# Patient Record
Sex: MALE | Race: WHITE | Employment: OTHER | URBAN - METROPOLITAN AREA
[De-identification: names, ages, dates, MRNs, and addresses within clinical notes are randomized per-mention and may not be internally consistent; named-entity substitution may affect disease eponyms.]

---

## 2017-01-01 ENCOUNTER — APPOINTMENT (OUTPATIENT)
Dept: GENERAL RADIOLOGY | Age: 82
DRG: 871 | End: 2017-01-01
Attending: EMERGENCY MEDICINE
Payer: MEDICARE

## 2017-01-01 ENCOUNTER — OFFICE VISIT (OUTPATIENT)
Dept: ORTHOPEDIC SURGERY | Age: 82
End: 2017-01-01

## 2017-01-01 ENCOUNTER — APPOINTMENT (OUTPATIENT)
Dept: ULTRASOUND IMAGING | Age: 82
DRG: 871 | End: 2017-01-01
Attending: FAMILY MEDICINE
Payer: MEDICARE

## 2017-01-01 ENCOUNTER — PATIENT OUTREACH (OUTPATIENT)
Dept: INTERNAL MEDICINE CLINIC | Age: 82
End: 2017-01-01

## 2017-01-01 ENCOUNTER — HOME VISIT (OUTPATIENT)
Dept: INTERNAL MEDICINE CLINIC | Age: 82
End: 2017-01-01

## 2017-01-01 ENCOUNTER — HOSPITAL ENCOUNTER (OUTPATIENT)
Dept: LAB | Age: 82
Discharge: HOME | End: 2017-07-28
Payer: MEDICARE

## 2017-01-01 ENCOUNTER — APPOINTMENT (OUTPATIENT)
Dept: CT IMAGING | Age: 82
DRG: 871 | End: 2017-01-01
Attending: EMERGENCY MEDICINE
Payer: MEDICARE

## 2017-01-01 ENCOUNTER — OFFICE VISIT (OUTPATIENT)
Dept: INTERNAL MEDICINE CLINIC | Age: 82
End: 2017-01-01

## 2017-01-01 ENCOUNTER — HOSPITAL ENCOUNTER (OUTPATIENT)
Dept: LAB | Age: 82
Discharge: HOME | End: 2017-09-21
Payer: MEDICARE

## 2017-01-01 ENCOUNTER — TELEPHONE (OUTPATIENT)
Dept: INTERNAL MEDICINE CLINIC | Age: 82
End: 2017-01-01

## 2017-01-01 ENCOUNTER — OFFICE VISIT (OUTPATIENT)
Dept: CARDIOLOGY CLINIC | Age: 82
End: 2017-01-01

## 2017-01-01 ENCOUNTER — HOSPITAL ENCOUNTER (OUTPATIENT)
Dept: LAB | Age: 82
Discharge: HOME | End: 2017-12-29
Payer: MEDICARE

## 2017-01-01 ENCOUNTER — HOSPITAL ENCOUNTER (INPATIENT)
Age: 82
LOS: 3 days | Discharge: NURSING, REHAB OR LONG-TERM CARE | DRG: 871 | End: 2017-08-14
Attending: EMERGENCY MEDICINE | Admitting: FAMILY MEDICINE
Payer: MEDICARE

## 2017-01-01 ENCOUNTER — APPOINTMENT (OUTPATIENT)
Dept: CT IMAGING | Age: 82
DRG: 871 | End: 2017-01-01
Attending: FAMILY MEDICINE
Payer: MEDICARE

## 2017-01-01 ENCOUNTER — DOCUMENTATION ONLY (OUTPATIENT)
Dept: ORTHOPEDIC SURGERY | Age: 82
End: 2017-01-01

## 2017-01-01 VITALS
HEIGHT: 71 IN | WEIGHT: 188 LBS | DIASTOLIC BLOOD PRESSURE: 54 MMHG | TEMPERATURE: 97.6 F | BODY MASS INDEX: 26.32 KG/M2 | RESPIRATION RATE: 20 BRPM | HEART RATE: 48 BPM | SYSTOLIC BLOOD PRESSURE: 102 MMHG | OXYGEN SATURATION: 98 %

## 2017-01-01 VITALS
HEIGHT: 71 IN | BODY MASS INDEX: 28 KG/M2 | HEART RATE: 64 BPM | TEMPERATURE: 98.7 F | DIASTOLIC BLOOD PRESSURE: 54 MMHG | WEIGHT: 200 LBS | OXYGEN SATURATION: 98 % | RESPIRATION RATE: 20 BRPM | SYSTOLIC BLOOD PRESSURE: 98 MMHG

## 2017-01-01 VITALS — SYSTOLIC BLOOD PRESSURE: 114 MMHG | OXYGEN SATURATION: 100 % | DIASTOLIC BLOOD PRESSURE: 58 MMHG | HEART RATE: 68 BPM

## 2017-01-01 VITALS — SYSTOLIC BLOOD PRESSURE: 124 MMHG | DIASTOLIC BLOOD PRESSURE: 76 MMHG | HEART RATE: 78 BPM

## 2017-01-01 VITALS
OXYGEN SATURATION: 98 % | BODY MASS INDEX: 24.78 KG/M2 | HEIGHT: 71 IN | RESPIRATION RATE: 16 BRPM | HEART RATE: 57 BPM | WEIGHT: 177 LBS | SYSTOLIC BLOOD PRESSURE: 110 MMHG | TEMPERATURE: 96.9 F | DIASTOLIC BLOOD PRESSURE: 62 MMHG

## 2017-01-01 VITALS
HEART RATE: 59 BPM | BODY MASS INDEX: 25.9 KG/M2 | OXYGEN SATURATION: 97 % | WEIGHT: 185 LBS | DIASTOLIC BLOOD PRESSURE: 52 MMHG | HEIGHT: 71 IN | SYSTOLIC BLOOD PRESSURE: 112 MMHG

## 2017-01-01 VITALS
RESPIRATION RATE: 16 BRPM | WEIGHT: 177.4 LBS | TEMPERATURE: 98.1 F | HEART RATE: 60 BPM | SYSTOLIC BLOOD PRESSURE: 131 MMHG | DIASTOLIC BLOOD PRESSURE: 67 MMHG | BODY MASS INDEX: 24.74 KG/M2

## 2017-01-01 VITALS
WEIGHT: 196.8 LBS | OXYGEN SATURATION: 100 % | BODY MASS INDEX: 27.55 KG/M2 | DIASTOLIC BLOOD PRESSURE: 59 MMHG | RESPIRATION RATE: 18 BRPM | HEART RATE: 78 BPM | HEIGHT: 71 IN | TEMPERATURE: 97.6 F | SYSTOLIC BLOOD PRESSURE: 134 MMHG

## 2017-01-01 VITALS
HEART RATE: 55 BPM | WEIGHT: 196 LBS | HEIGHT: 71 IN | TEMPERATURE: 97.7 F | DIASTOLIC BLOOD PRESSURE: 68 MMHG | OXYGEN SATURATION: 99 % | SYSTOLIC BLOOD PRESSURE: 128 MMHG | RESPIRATION RATE: 20 BRPM | BODY MASS INDEX: 27.44 KG/M2

## 2017-01-01 VITALS
OXYGEN SATURATION: 95 % | HEART RATE: 54 BPM | TEMPERATURE: 98.1 F | SYSTOLIC BLOOD PRESSURE: 162 MMHG | HEIGHT: 71 IN | BODY MASS INDEX: 28.6 KG/M2 | RESPIRATION RATE: 18 BRPM | DIASTOLIC BLOOD PRESSURE: 70 MMHG | WEIGHT: 204.3 LBS

## 2017-01-01 VITALS — HEART RATE: 78 BPM | DIASTOLIC BLOOD PRESSURE: 68 MMHG | OXYGEN SATURATION: 94 % | SYSTOLIC BLOOD PRESSURE: 142 MMHG

## 2017-01-01 DIAGNOSIS — G93.40 ACUTE ENCEPHALOPATHY: Primary | ICD-10-CM

## 2017-01-01 DIAGNOSIS — G93.40 ENCEPHALOPATHY: Primary | ICD-10-CM

## 2017-01-01 DIAGNOSIS — I10 ESSENTIAL HYPERTENSION, BENIGN: ICD-10-CM

## 2017-01-01 DIAGNOSIS — M48.061 SPINAL STENOSIS, LUMBAR REGION, WITHOUT NEUROGENIC CLAUDICATION: Primary | ICD-10-CM

## 2017-01-01 DIAGNOSIS — R60.0 BILATERAL EDEMA OF LOWER EXTREMITY: Primary | ICD-10-CM

## 2017-01-01 DIAGNOSIS — M48.10 DISH (DIFFUSE IDIOPATHIC SKELETAL HYPEROSTOSIS): Chronic | ICD-10-CM

## 2017-01-01 DIAGNOSIS — R63.0 ANOREXIA: ICD-10-CM

## 2017-01-01 DIAGNOSIS — J18.9 CAP (COMMUNITY ACQUIRED PNEUMONIA): ICD-10-CM

## 2017-01-01 DIAGNOSIS — M50.30 DDD (DEGENERATIVE DISC DISEASE), CERVICAL: ICD-10-CM

## 2017-01-01 DIAGNOSIS — K80.20 GALLSTONES: ICD-10-CM

## 2017-01-01 DIAGNOSIS — K81.1 CHRONIC CHOLECYSTITIS: ICD-10-CM

## 2017-01-01 DIAGNOSIS — I25.10 ATHEROSCLEROSIS OF NATIVE CORONARY ARTERY OF NATIVE HEART WITHOUT ANGINA PECTORIS: ICD-10-CM

## 2017-01-01 DIAGNOSIS — G45.8 OTHER SPECIFIED TRANSIENT CEREBRAL ISCHEMIAS: ICD-10-CM

## 2017-01-01 DIAGNOSIS — R10.9 INTRACTABLE ABDOMINAL PAIN: ICD-10-CM

## 2017-01-01 DIAGNOSIS — E87.6 HYPOKALEMIA: ICD-10-CM

## 2017-01-01 DIAGNOSIS — I10 ESSENTIAL HYPERTENSION, BENIGN: Primary | ICD-10-CM

## 2017-01-01 DIAGNOSIS — G93.40 ENCEPHALOPATHY: ICD-10-CM

## 2017-01-01 DIAGNOSIS — M48.10 DISH (DIFFUSE IDIOPATHIC SKELETAL HYPEROSTOSIS): Primary | Chronic | ICD-10-CM

## 2017-01-01 DIAGNOSIS — R60.0 BILATERAL LEG EDEMA: Primary | ICD-10-CM

## 2017-01-01 DIAGNOSIS — A41.9 SEPSIS, DUE TO UNSPECIFIED ORGANISM: Primary | ICD-10-CM

## 2017-01-01 DIAGNOSIS — E78.5 DYSLIPIDEMIA: ICD-10-CM

## 2017-01-01 DIAGNOSIS — G93.40 ACUTE ENCEPHALOPATHY: ICD-10-CM

## 2017-01-01 DIAGNOSIS — R91.1 LUNG NODULE: ICD-10-CM

## 2017-01-01 DIAGNOSIS — M48.061 SPINAL STENOSIS, LUMBAR REGION, WITHOUT NEUROGENIC CLAUDICATION: ICD-10-CM

## 2017-01-01 LAB
ALBUMIN SERPL BCP-MCNC: 3.4 G/DL (ref 3.4–5)
ALBUMIN SERPL BCP-MCNC: 3.6 G/DL (ref 3.4–5)
ALBUMIN SERPL-MCNC: 3.5 G/DL (ref 3.4–5)
ALBUMIN SERPL-MCNC: 3.8 G/DL (ref 3.4–5)
ALBUMIN/GLOB SERPL: 1 {RATIO} (ref 0.8–1.7)
ALBUMIN/GLOB SERPL: 1.3 {RATIO} (ref 0.8–1.7)
ALBUMIN/GLOB SERPL: 1.3 {RATIO} (ref 0.8–1.7)
ALBUMIN/GLOB SERPL: 1.4 {RATIO} (ref 0.8–1.7)
ALP SERPL-CCNC: 135 U/L (ref 45–117)
ALP SERPL-CCNC: 59 U/L (ref 45–117)
ALP SERPL-CCNC: 65 U/L (ref 45–117)
ALP SERPL-CCNC: 67 U/L (ref 45–117)
ALT SERPL-CCNC: 14 U/L (ref 16–61)
ALT SERPL-CCNC: 16 U/L (ref 16–61)
ALT SERPL-CCNC: 17 U/L (ref 16–61)
ALT SERPL-CCNC: 55 U/L (ref 16–61)
AMMONIA PLAS-SCNC: 26 UMOL/L (ref 11–32)
ANION GAP BLD CALC-SCNC: 7 MMOL/L (ref 3–18)
ANION GAP BLD CALC-SCNC: 8 MMOL/L (ref 3–18)
ANION GAP BLD CALC-SCNC: 9 MMOL/L (ref 3–18)
ANION GAP SERPL CALC-SCNC: 10 MMOL/L (ref 3–18)
ANION GAP SERPL CALC-SCNC: 4 MMOL/L (ref 3–18)
APPEARANCE UR: CLEAR
APTT PPP: 29.3 SEC (ref 23–36.4)
AST SERPL W P-5'-P-CCNC: 12 U/L (ref 15–37)
AST SERPL W P-5'-P-CCNC: 12 U/L (ref 15–37)
AST SERPL-CCNC: 12 U/L (ref 15–37)
AST SERPL-CCNC: 26 U/L (ref 15–37)
ATRIAL RATE: 81 BPM
BACTERIA SPEC CULT: NORMAL
BACTERIA SPEC CULT: NORMAL
BASOPHILS # BLD AUTO: 0 K/UL (ref 0–0.06)
BASOPHILS # BLD AUTO: 0 K/UL (ref 0–0.06)
BASOPHILS # BLD AUTO: 0 K/UL (ref 0–0.1)
BASOPHILS # BLD: 0 % (ref 0–2)
BASOPHILS # BLD: 0 % (ref 0–3)
BASOPHILS # BLD: 0 K/UL (ref 0–0.06)
BASOPHILS # BLD: 0 K/UL (ref 0–0.06)
BASOPHILS NFR BLD: 0 % (ref 0–2)
BASOPHILS NFR BLD: 0 % (ref 0–2)
BILIRUB SERPL-MCNC: 0.4 MG/DL (ref 0.2–1)
BILIRUB SERPL-MCNC: 0.4 MG/DL (ref 0.2–1)
BILIRUB SERPL-MCNC: 0.6 MG/DL (ref 0.2–1)
BILIRUB SERPL-MCNC: 0.8 MG/DL (ref 0.2–1)
BILIRUB UR QL: NEGATIVE
BNP SERPL-MCNC: 1227 PG/ML (ref 0–1800)
BUN SERPL-MCNC: 11 MG/DL (ref 7–18)
BUN SERPL-MCNC: 12 MG/DL (ref 7–18)
BUN SERPL-MCNC: 13 MG/DL (ref 7–18)
BUN SERPL-MCNC: 14 MG/DL (ref 7–18)
BUN SERPL-MCNC: 15 MG/DL (ref 7–18)
BUN SERPL-MCNC: 15 MG/DL (ref 7–18)
BUN SERPL-MCNC: 16 MG/DL (ref 7–18)
BUN SERPL-MCNC: 18 MG/DL (ref 7–18)
BUN/CREAT SERPL: 14 (ref 12–20)
BUN/CREAT SERPL: 16 (ref 12–20)
BUN/CREAT SERPL: 16 (ref 12–20)
BUN/CREAT SERPL: 21 (ref 12–20)
BUN/CREAT SERPL: 22 (ref 12–20)
BUN/CREAT SERPL: 22 (ref 12–20)
BUN/CREAT SERPL: 24 (ref 12–20)
BUN/CREAT SERPL: 25 (ref 12–20)
CALCIUM SERPL-MCNC: 7.9 MG/DL (ref 8.5–10.1)
CALCIUM SERPL-MCNC: 8 MG/DL (ref 8.5–10.1)
CALCIUM SERPL-MCNC: 8.3 MG/DL (ref 8.5–10.1)
CALCIUM SERPL-MCNC: 8.4 MG/DL (ref 8.5–10.1)
CALCIUM SERPL-MCNC: 8.8 MG/DL (ref 8.5–10.1)
CALCIUM SERPL-MCNC: 8.9 MG/DL (ref 8.5–10.1)
CALCIUM SERPL-MCNC: 9 MG/DL (ref 8.5–10.1)
CALCIUM SERPL-MCNC: 9.2 MG/DL (ref 8.5–10.1)
CALCULATED P AXIS, ECG09: 53 DEGREES
CALCULATED R AXIS, ECG10: -24 DEGREES
CALCULATED T AXIS, ECG11: 47 DEGREES
CHLORIDE SERPL-SCNC: 102 MMOL/L (ref 100–108)
CHLORIDE SERPL-SCNC: 103 MMOL/L (ref 100–108)
CHLORIDE SERPL-SCNC: 103 MMOL/L (ref 100–108)
CHLORIDE SERPL-SCNC: 106 MMOL/L (ref 100–108)
CHLORIDE SERPL-SCNC: 106 MMOL/L (ref 100–108)
CHLORIDE SERPL-SCNC: 109 MMOL/L (ref 100–108)
CHLORIDE SERPL-SCNC: 110 MMOL/L (ref 100–108)
CHLORIDE SERPL-SCNC: 110 MMOL/L (ref 100–108)
CK MB CFR SERPL CALC: NORMAL % (ref 0–4)
CK MB SERPL-MCNC: <1 NG/ML (ref 5–25)
CK SERPL-CCNC: 45 U/L (ref 39–308)
CO2 SERPL-SCNC: 25 MMOL/L (ref 21–32)
CO2 SERPL-SCNC: 28 MMOL/L (ref 21–32)
CO2 SERPL-SCNC: 29 MMOL/L (ref 21–32)
CO2 SERPL-SCNC: 30 MMOL/L (ref 21–32)
CO2 SERPL-SCNC: 33 MMOL/L (ref 21–32)
CO2 SERPL-SCNC: 34 MMOL/L (ref 21–32)
COLOR UR: YELLOW
CREAT SERPL-MCNC: 0.56 MG/DL (ref 0.6–1.3)
CREAT SERPL-MCNC: 0.63 MG/DL (ref 0.6–1.3)
CREAT SERPL-MCNC: 0.65 MG/DL (ref 0.6–1.3)
CREAT SERPL-MCNC: 0.71 MG/DL (ref 0.6–1.3)
CREAT SERPL-MCNC: 0.72 MG/DL (ref 0.6–1.3)
CREAT SERPL-MCNC: 0.78 MG/DL (ref 0.6–1.3)
CREAT SERPL-MCNC: 0.79 MG/DL (ref 0.6–1.3)
CREAT SERPL-MCNC: 0.93 MG/DL (ref 0.6–1.3)
DATE LAST DOSE: ABNORMAL
DIAGNOSIS, 93000: NORMAL
DIFFERENTIAL METHOD BLD: ABNORMAL
EOSINOPHIL # BLD: 0 K/UL (ref 0–0.4)
EOSINOPHIL # BLD: 0 K/UL (ref 0–0.4)
EOSINOPHIL # BLD: 0.1 K/UL (ref 0–0.4)
EOSINOPHIL # BLD: 0.3 K/UL (ref 0–0.4)
EOSINOPHIL # BLD: 0.4 K/UL (ref 0–0.4)
EOSINOPHIL # BLD: 0.4 K/UL (ref 0–0.4)
EOSINOPHIL NFR BLD: 0 % (ref 0–5)
EOSINOPHIL NFR BLD: 0 % (ref 0–5)
EOSINOPHIL NFR BLD: 1 % (ref 0–5)
EOSINOPHIL NFR BLD: 3 % (ref 0–5)
EOSINOPHIL NFR BLD: 5 % (ref 0–5)
EOSINOPHIL NFR BLD: 6 % (ref 0–5)
ERYTHROCYTE [DISTWIDTH] IN BLOOD BY AUTOMATED COUNT: 12.9 % (ref 11.6–14.5)
ERYTHROCYTE [DISTWIDTH] IN BLOOD BY AUTOMATED COUNT: 13.2 % (ref 11.6–14.5)
ERYTHROCYTE [DISTWIDTH] IN BLOOD BY AUTOMATED COUNT: 13.4 % (ref 11.6–14.5)
ERYTHROCYTE [DISTWIDTH] IN BLOOD BY AUTOMATED COUNT: 13.6 % (ref 11.6–14.5)
ERYTHROCYTE [DISTWIDTH] IN BLOOD BY AUTOMATED COUNT: 14 % (ref 11.6–14.5)
EST. AVERAGE GLUCOSE BLD GHB EST-MCNC: 146 MG/DL
GLOBULIN SER CALC-MCNC: 2.7 G/DL (ref 2–4)
GLOBULIN SER CALC-MCNC: 3.5 G/DL (ref 2–4)
GLUCOSE BLD STRIP.AUTO-MCNC: 128 MG/DL (ref 70–110)
GLUCOSE SERPL-MCNC: 102 MG/DL (ref 74–99)
GLUCOSE SERPL-MCNC: 108 MG/DL (ref 74–99)
GLUCOSE SERPL-MCNC: 110 MG/DL (ref 74–99)
GLUCOSE SERPL-MCNC: 113 MG/DL (ref 74–99)
GLUCOSE SERPL-MCNC: 117 MG/DL (ref 74–99)
GLUCOSE SERPL-MCNC: 123 MG/DL (ref 74–99)
GLUCOSE SERPL-MCNC: 140 MG/DL (ref 74–99)
GLUCOSE SERPL-MCNC: 193 MG/DL (ref 74–99)
GLUCOSE UR STRIP.AUTO-MCNC: NEGATIVE MG/DL
HBA1C MFR BLD: 6.7 % (ref 4.2–5.6)
HCT VFR BLD AUTO: 31.8 % (ref 36–48)
HCT VFR BLD AUTO: 32.8 % (ref 36–48)
HCT VFR BLD AUTO: 35.8 % (ref 36–48)
HCT VFR BLD AUTO: 36.6 % (ref 36–48)
HCT VFR BLD AUTO: 38 % (ref 36–48)
HCT VFR BLD AUTO: 39.8 % (ref 36–48)
HCT VFR BLD AUTO: 41.3 % (ref 36–48)
HCT VFR BLD AUTO: 42.3 % (ref 36–48)
HGB BLD-MCNC: 10.8 G/DL (ref 13–16)
HGB BLD-MCNC: 11.1 G/DL (ref 13–16)
HGB BLD-MCNC: 11.9 G/DL (ref 13–16)
HGB BLD-MCNC: 12.1 G/DL (ref 13–16)
HGB BLD-MCNC: 12.2 G/DL (ref 13–16)
HGB BLD-MCNC: 13.5 G/DL (ref 13–16)
HGB UR QL STRIP: NEGATIVE
INR PPP: 1.1 (ref 0.8–1.2)
KETONES UR QL STRIP.AUTO: NEGATIVE MG/DL
LACTATE BLD-SCNC: 1.8 MMOL/L (ref 0.4–2)
LACTATE BLD-SCNC: 2.6 MMOL/L (ref 0.4–2)
LEUKOCYTE ESTERASE UR QL STRIP.AUTO: NEGATIVE
LIPASE SERPL-CCNC: 60 U/L (ref 73–393)
LYMPHOCYTES # BLD AUTO: 10 % (ref 21–52)
LYMPHOCYTES # BLD AUTO: 16 % (ref 21–52)
LYMPHOCYTES # BLD AUTO: 16 % (ref 21–52)
LYMPHOCYTES # BLD AUTO: 18 % (ref 21–52)
LYMPHOCYTES # BLD AUTO: 35 % (ref 21–52)
LYMPHOCYTES # BLD AUTO: 7 % (ref 20–51)
LYMPHOCYTES # BLD: 1 K/UL (ref 0.8–3.5)
LYMPHOCYTES # BLD: 1.5 K/UL (ref 0.9–3.6)
LYMPHOCYTES # BLD: 1.6 K/UL (ref 0.9–3.6)
LYMPHOCYTES # BLD: 1.7 K/UL (ref 0.9–3.6)
LYMPHOCYTES # BLD: 2.3 K/UL (ref 0.9–3.6)
LYMPHOCYTES # BLD: 2.4 K/UL (ref 0.9–3.6)
LYMPHOCYTES NFR BLD: 14 % (ref 21–52)
LYMPHOCYTES NFR BLD: 41 % (ref 21–52)
MAGNESIUM SERPL-MCNC: 1.8 MG/DL (ref 1.6–2.6)
MCH RBC QN AUTO: 29.3 PG (ref 24–34)
MCH RBC QN AUTO: 30 PG (ref 24–34)
MCH RBC QN AUTO: 30.3 PG (ref 24–34)
MCH RBC QN AUTO: 30.6 PG (ref 24–34)
MCH RBC QN AUTO: 30.7 PG (ref 24–34)
MCH RBC QN AUTO: 30.7 PG (ref 24–34)
MCH RBC QN AUTO: 30.9 PG (ref 24–34)
MCH RBC QN AUTO: 31.3 PG (ref 24–34)
MCHC RBC AUTO-ENTMCNC: 31.9 G/DL (ref 31–37)
MCHC RBC AUTO-ENTMCNC: 32.1 G/DL (ref 31–37)
MCHC RBC AUTO-ENTMCNC: 32.7 G/DL (ref 31–37)
MCHC RBC AUTO-ENTMCNC: 33.1 G/DL (ref 31–37)
MCHC RBC AUTO-ENTMCNC: 33.2 G/DL (ref 31–37)
MCHC RBC AUTO-ENTMCNC: 33.8 G/DL (ref 31–37)
MCHC RBC AUTO-ENTMCNC: 33.9 G/DL (ref 31–37)
MCHC RBC AUTO-ENTMCNC: 34 G/DL (ref 31–37)
MCV RBC AUTO: 89.3 FL (ref 74–97)
MCV RBC AUTO: 90.9 FL (ref 74–97)
MCV RBC AUTO: 92 FL (ref 74–97)
MCV RBC AUTO: 92.3 FL (ref 74–97)
MCV RBC AUTO: 92.3 FL (ref 74–97)
MCV RBC AUTO: 92.4 FL (ref 74–97)
MCV RBC AUTO: 93.6 FL (ref 74–97)
MCV RBC AUTO: 94.5 FL (ref 74–97)
MONOCYTES # BLD: 0.5 K/UL (ref 0.05–1.2)
MONOCYTES # BLD: 0.5 K/UL (ref 0.05–1.2)
MONOCYTES # BLD: 0.6 K/UL (ref 0.05–1.2)
MONOCYTES # BLD: 0.7 K/UL (ref 0.05–1.2)
MONOCYTES # BLD: 0.7 K/UL (ref 0.05–1.2)
MONOCYTES # BLD: 0.8 K/UL (ref 0.05–1.2)
MONOCYTES # BLD: 0.8 K/UL (ref 0.05–1.2)
MONOCYTES # BLD: 0.9 K/UL (ref 0–1)
MONOCYTES NFR BLD AUTO: 5 % (ref 3–10)
MONOCYTES NFR BLD AUTO: 5 % (ref 3–10)
MONOCYTES NFR BLD AUTO: 6 % (ref 2–9)
MONOCYTES NFR BLD AUTO: 7 % (ref 3–10)
MONOCYTES NFR BLD AUTO: 7 % (ref 3–10)
MONOCYTES NFR BLD AUTO: 8 % (ref 3–10)
MONOCYTES NFR BLD: 6 % (ref 3–10)
MONOCYTES NFR BLD: 9 % (ref 3–10)
NEUTS BAND NFR BLD MANUAL: 10 % (ref 0–5)
NEUTS SEG # BLD: 12.6 K/UL (ref 1.8–8)
NEUTS SEG # BLD: 12.8 K/UL (ref 1.8–8)
NEUTS SEG # BLD: 2.6 K/UL (ref 1.8–8)
NEUTS SEG # BLD: 3.5 K/UL (ref 1.8–8)
NEUTS SEG # BLD: 6.4 K/UL (ref 1.8–8)
NEUTS SEG # BLD: 6.9 K/UL (ref 1.8–8)
NEUTS SEG # BLD: 8 K/UL (ref 1.8–8)
NEUTS SEG # BLD: 9.3 K/UL (ref 1.8–8)
NEUTS SEG NFR BLD AUTO: 51 % (ref 40–73)
NEUTS SEG NFR BLD AUTO: 76 % (ref 40–73)
NEUTS SEG NFR BLD AUTO: 77 % (ref 42–75)
NEUTS SEG NFR BLD AUTO: 85 % (ref 40–73)
NEUTS SEG NFR BLD: 45 % (ref 40–73)
NEUTS SEG NFR BLD: 77 % (ref 40–73)
NITRITE UR QL STRIP.AUTO: NEGATIVE
P-R INTERVAL, ECG05: 198 MS
PH UR STRIP: 6 [PH] (ref 5–8)
PLATELET # BLD AUTO: 136 K/UL (ref 135–420)
PLATELET # BLD AUTO: 147 K/UL (ref 135–420)
PLATELET # BLD AUTO: 147 K/UL (ref 135–420)
PLATELET # BLD AUTO: 161 K/UL (ref 135–420)
PLATELET # BLD AUTO: 163 K/UL (ref 135–420)
PLATELET # BLD AUTO: 169 K/UL (ref 135–420)
PLATELET # BLD AUTO: 176 K/UL (ref 135–420)
PLATELET # BLD AUTO: 285 K/UL (ref 135–420)
PLATELET COMMENTS,PCOM: ABNORMAL
PMV BLD AUTO: 10.4 FL (ref 9.2–11.8)
PMV BLD AUTO: 11.1 FL (ref 9.2–11.8)
PMV BLD AUTO: 11.2 FL (ref 9.2–11.8)
PMV BLD AUTO: 11.4 FL (ref 9.2–11.8)
PMV BLD AUTO: 11.4 FL (ref 9.2–11.8)
PMV BLD AUTO: 11.6 FL (ref 9.2–11.8)
POTASSIUM SERPL-SCNC: 3.2 MMOL/L (ref 3.5–5.5)
POTASSIUM SERPL-SCNC: 3.2 MMOL/L (ref 3.5–5.5)
POTASSIUM SERPL-SCNC: 3.3 MMOL/L (ref 3.5–5.5)
POTASSIUM SERPL-SCNC: 3.5 MMOL/L (ref 3.5–5.5)
POTASSIUM SERPL-SCNC: 3.5 MMOL/L (ref 3.5–5.5)
POTASSIUM SERPL-SCNC: 4.3 MMOL/L (ref 3.5–5.5)
POTASSIUM SERPL-SCNC: 4.4 MMOL/L (ref 3.5–5.5)
POTASSIUM SERPL-SCNC: 4.7 MMOL/L (ref 3.5–5.5)
PROT SERPL-MCNC: 6.2 G/DL (ref 6.4–8.2)
PROT SERPL-MCNC: 6.3 G/DL (ref 6.4–8.2)
PROT SERPL-MCNC: 6.5 G/DL (ref 6.4–8.2)
PROT SERPL-MCNC: 6.9 G/DL (ref 6.4–8.2)
PROT UR STRIP-MCNC: NEGATIVE MG/DL
PROTHROMBIN TIME: 13.5 SEC (ref 11.5–15.2)
PSA SERPL-MCNC: 2.2 NG/ML (ref 0–4)
Q-T INTERVAL, ECG07: 408 MS
QRS DURATION, ECG06: 156 MS
QTC CALCULATION (BEZET), ECG08: 473 MS
RBC # BLD AUTO: 3.56 M/UL (ref 4.7–5.5)
RBC # BLD AUTO: 3.61 M/UL (ref 4.7–5.5)
RBC # BLD AUTO: 3.88 M/UL (ref 4.7–5.5)
RBC # BLD AUTO: 3.96 M/UL (ref 4.7–5.5)
RBC # BLD AUTO: 4.06 M/UL (ref 4.7–5.5)
RBC # BLD AUTO: 4.31 M/UL (ref 4.7–5.5)
RBC # BLD AUTO: 4.37 M/UL (ref 4.7–5.5)
RBC # BLD AUTO: 4.6 M/UL (ref 4.7–5.5)
RBC MORPH BLD: ABNORMAL
REPORTED DOSE,DOSE: ABNORMAL UNITS
REPORTED DOSE/TIME,TMG: 1300
SERVICE CMNT-IMP: NORMAL
SERVICE CMNT-IMP: NORMAL
SODIUM SERPL-SCNC: 139 MMOL/L (ref 136–145)
SODIUM SERPL-SCNC: 141 MMOL/L (ref 136–145)
SODIUM SERPL-SCNC: 142 MMOL/L (ref 136–145)
SODIUM SERPL-SCNC: 143 MMOL/L (ref 136–145)
SODIUM SERPL-SCNC: 143 MMOL/L (ref 136–145)
SODIUM SERPL-SCNC: 144 MMOL/L (ref 136–145)
SODIUM SERPL-SCNC: 145 MMOL/L (ref 136–145)
SODIUM SERPL-SCNC: 146 MMOL/L (ref 136–145)
SP GR UR REFRACTOMETRY: 1.03 (ref 1–1.03)
TROPONIN I SERPL-MCNC: <0.02 NG/ML (ref 0–0.04)
TSH SERPL DL<=0.05 MIU/L-ACNC: 0.5 UIU/ML (ref 0.36–3.74)
TSH SERPL DL<=0.05 MIU/L-ACNC: 0.82 UIU/ML (ref 0.36–3.74)
UROBILINOGEN UR QL STRIP.AUTO: 1 EU/DL (ref 0.2–1)
VANCOMYCIN TROUGH SERPL-MCNC: 5.9 UG/ML (ref 10–20)
VENTRICULAR RATE, ECG03: 81 BPM
WBC # BLD AUTO: 10.4 K/UL (ref 4.6–13.2)
WBC # BLD AUTO: 12.1 K/UL (ref 4.6–13.2)
WBC # BLD AUTO: 14.5 K/UL (ref 4.6–13.2)
WBC # BLD AUTO: 15.1 K/UL (ref 4.6–13.2)
WBC # BLD AUTO: 5.7 K/UL (ref 4.6–13.2)
WBC # BLD AUTO: 7 K/UL (ref 4.6–13.2)
WBC # BLD AUTO: 8.5 K/UL (ref 4.6–13.2)
WBC # BLD AUTO: 9.1 K/UL (ref 4.6–13.2)

## 2017-01-01 PROCEDURE — 36415 COLL VENOUS BLD VENIPUNCTURE: CPT

## 2017-01-01 PROCEDURE — 74011636320 HC RX REV CODE- 636/320: Performed by: EMERGENCY MEDICINE

## 2017-01-01 PROCEDURE — 80048 BASIC METABOLIC PNL TOTAL CA: CPT

## 2017-01-01 PROCEDURE — 85610 PROTHROMBIN TIME: CPT | Performed by: EMERGENCY MEDICINE

## 2017-01-01 PROCEDURE — 74011250636 HC RX REV CODE- 250/636: Performed by: FAMILY MEDICINE

## 2017-01-01 PROCEDURE — 85025 COMPLETE CBC W/AUTO DIFF WBC: CPT

## 2017-01-01 PROCEDURE — 82962 GLUCOSE BLOOD TEST: CPT

## 2017-01-01 PROCEDURE — 65270000029 HC RM PRIVATE

## 2017-01-01 PROCEDURE — 83690 ASSAY OF LIPASE: CPT | Performed by: EMERGENCY MEDICINE

## 2017-01-01 PROCEDURE — 74011250637 HC RX REV CODE- 250/637: Performed by: FAMILY MEDICINE

## 2017-01-01 PROCEDURE — 84443 ASSAY THYROID STIM HORMONE: CPT | Performed by: INTERNAL MEDICINE

## 2017-01-01 PROCEDURE — 80202 ASSAY OF VANCOMYCIN: CPT

## 2017-01-01 PROCEDURE — 97116 GAIT TRAINING THERAPY: CPT

## 2017-01-01 PROCEDURE — 76705 ECHO EXAM OF ABDOMEN: CPT

## 2017-01-01 PROCEDURE — 83735 ASSAY OF MAGNESIUM: CPT | Performed by: EMERGENCY MEDICINE

## 2017-01-01 PROCEDURE — 96375 TX/PRO/DX INJ NEW DRUG ADDON: CPT

## 2017-01-01 PROCEDURE — 97530 THERAPEUTIC ACTIVITIES: CPT

## 2017-01-01 PROCEDURE — 83036 HEMOGLOBIN GLYCOSYLATED A1C: CPT

## 2017-01-01 PROCEDURE — 74011000258 HC RX REV CODE- 258: Performed by: EMERGENCY MEDICINE

## 2017-01-01 PROCEDURE — 36415 COLL VENOUS BLD VENIPUNCTURE: CPT | Performed by: FAMILY MEDICINE

## 2017-01-01 PROCEDURE — 87040 BLOOD CULTURE FOR BACTERIA: CPT | Performed by: EMERGENCY MEDICINE

## 2017-01-01 PROCEDURE — 82140 ASSAY OF AMMONIA: CPT | Performed by: EMERGENCY MEDICINE

## 2017-01-01 PROCEDURE — 74011250636 HC RX REV CODE- 250/636: Performed by: EMERGENCY MEDICINE

## 2017-01-01 PROCEDURE — 97162 PT EVAL MOD COMPLEX 30 MIN: CPT

## 2017-01-01 PROCEDURE — 94760 N-INVAS EAR/PLS OXIMETRY 1: CPT

## 2017-01-01 PROCEDURE — 81003 URINALYSIS AUTO W/O SCOPE: CPT | Performed by: EMERGENCY MEDICINE

## 2017-01-01 PROCEDURE — 82550 ASSAY OF CK (CPK): CPT | Performed by: EMERGENCY MEDICINE

## 2017-01-01 PROCEDURE — 85025 COMPLETE CBC W/AUTO DIFF WBC: CPT | Performed by: FAMILY MEDICINE

## 2017-01-01 PROCEDURE — 77030027138 HC INCENT SPIROMETER -A

## 2017-01-01 PROCEDURE — 71250 CT THORAX DX C-: CPT

## 2017-01-01 PROCEDURE — 93005 ELECTROCARDIOGRAM TRACING: CPT

## 2017-01-01 PROCEDURE — 80053 COMPREHEN METABOLIC PANEL: CPT | Performed by: INTERNAL MEDICINE

## 2017-01-01 PROCEDURE — 85730 THROMBOPLASTIN TIME PARTIAL: CPT | Performed by: EMERGENCY MEDICINE

## 2017-01-01 PROCEDURE — 83880 ASSAY OF NATRIURETIC PEPTIDE: CPT | Performed by: FAMILY MEDICINE

## 2017-01-01 PROCEDURE — 83605 ASSAY OF LACTIC ACID: CPT

## 2017-01-01 PROCEDURE — 85025 COMPLETE CBC W/AUTO DIFF WBC: CPT | Performed by: INTERNAL MEDICINE

## 2017-01-01 PROCEDURE — 84153 ASSAY OF PSA TOTAL: CPT | Performed by: FAMILY MEDICINE

## 2017-01-01 PROCEDURE — 80048 BASIC METABOLIC PNL TOTAL CA: CPT | Performed by: FAMILY MEDICINE

## 2017-01-01 PROCEDURE — 71010 XR CHEST PORT: CPT

## 2017-01-01 PROCEDURE — 70450 CT HEAD/BRAIN W/O DYE: CPT

## 2017-01-01 PROCEDURE — 99285 EMERGENCY DEPT VISIT HI MDM: CPT

## 2017-01-01 PROCEDURE — 84443 ASSAY THYROID STIM HORMONE: CPT | Performed by: EMERGENCY MEDICINE

## 2017-01-01 PROCEDURE — 96365 THER/PROPH/DIAG IV INF INIT: CPT

## 2017-01-01 PROCEDURE — 85025 COMPLETE CBC W/AUTO DIFF WBC: CPT | Performed by: EMERGENCY MEDICINE

## 2017-01-01 PROCEDURE — 74177 CT ABD & PELVIS W/CONTRAST: CPT

## 2017-01-01 PROCEDURE — 80053 COMPREHEN METABOLIC PANEL: CPT | Performed by: EMERGENCY MEDICINE

## 2017-01-01 PROCEDURE — 96361 HYDRATE IV INFUSION ADD-ON: CPT

## 2017-01-01 RX ORDER — SODIUM CHLORIDE 0.9 % (FLUSH) 0.9 %
5-10 SYRINGE (ML) INJECTION AS NEEDED
Status: DISCONTINUED | OUTPATIENT
Start: 2017-01-01 | End: 2017-01-01 | Stop reason: HOSPADM

## 2017-01-01 RX ORDER — FUROSEMIDE 20 MG/1
TABLET ORAL
Qty: 30 TAB | Refills: 2 | Status: SHIPPED | OUTPATIENT
Start: 2017-01-01 | End: 2018-01-01

## 2017-01-01 RX ORDER — OXYCODONE AND ACETAMINOPHEN 10; 325 MG/1; MG/1
1 TABLET ORAL
Qty: 90 TAB | Refills: 0 | Status: SHIPPED | OUTPATIENT
Start: 2017-01-01 | End: 2017-01-01 | Stop reason: SDUPTHER

## 2017-01-01 RX ORDER — OXYCODONE AND ACETAMINOPHEN 10; 325 MG/1; MG/1
1 TABLET ORAL
Qty: 90 TAB | Refills: 0 | Status: SHIPPED | OUTPATIENT
Start: 2018-01-01 | End: 2017-01-01 | Stop reason: SDUPTHER

## 2017-01-01 RX ORDER — URSODIOL 300 MG/1
CAPSULE ORAL
Qty: 60 CAP | Refills: 3 | Status: SHIPPED | OUTPATIENT
Start: 2017-01-01 | End: 2018-01-01 | Stop reason: ALTCHOICE

## 2017-01-01 RX ORDER — OXYCODONE HCL 20 MG/1
20 TABLET, FILM COATED, EXTENDED RELEASE ORAL EVERY 12 HOURS
Qty: 60 TAB | Refills: 0 | Status: SHIPPED | OUTPATIENT
Start: 2017-01-01 | End: 2017-01-01 | Stop reason: SDUPTHER

## 2017-01-01 RX ORDER — METOPROLOL TARTRATE 50 MG/1
TABLET ORAL
Qty: 60 TAB | Refills: 11 | Status: SHIPPED | OUTPATIENT
Start: 2017-01-01

## 2017-01-01 RX ORDER — NITROGLYCERIN 0.4 MG/1
TABLET SUBLINGUAL
COMMUNITY

## 2017-01-01 RX ORDER — KETOROLAC TROMETHAMINE 30 MG/ML
15 INJECTION, SOLUTION INTRAMUSCULAR; INTRAVENOUS
Status: DISCONTINUED | OUTPATIENT
Start: 2017-01-01 | End: 2017-01-01 | Stop reason: HOSPADM

## 2017-01-01 RX ORDER — SODIUM CHLORIDE AND POTASSIUM CHLORIDE .9; .15 G/100ML; G/100ML
SOLUTION INTRAVENOUS CONTINUOUS
Status: DISCONTINUED | OUTPATIENT
Start: 2017-01-01 | End: 2017-01-01 | Stop reason: HOSPADM

## 2017-01-01 RX ORDER — OXYCODONE AND ACETAMINOPHEN 10; 325 MG/1; MG/1
1 TABLET ORAL
Qty: 90 TAB | Refills: 0 | Status: SHIPPED | OUTPATIENT
Start: 2017-01-01 | End: 2017-01-01 | Stop reason: ALTCHOICE

## 2017-01-01 RX ORDER — POTASSIUM CHLORIDE 7.45 MG/ML
10 INJECTION INTRAVENOUS
Status: DISPENSED | OUTPATIENT
Start: 2017-01-01 | End: 2017-01-01

## 2017-01-01 RX ORDER — METOPROLOL TARTRATE 50 MG/1
50 TABLET ORAL DAILY
Status: DISCONTINUED | OUTPATIENT
Start: 2017-01-01 | End: 2017-01-01

## 2017-01-01 RX ORDER — OXYCODONE AND ACETAMINOPHEN 5; 325 MG/1; MG/1
1 TABLET ORAL
Status: DISCONTINUED | OUTPATIENT
Start: 2017-01-01 | End: 2017-01-01 | Stop reason: HOSPADM

## 2017-01-01 RX ORDER — LEVOFLOXACIN 5 MG/ML
750 INJECTION, SOLUTION INTRAVENOUS EVERY 24 HOURS
Status: DISCONTINUED | OUTPATIENT
Start: 2017-01-01 | End: 2017-01-01 | Stop reason: HOSPADM

## 2017-01-01 RX ORDER — OXYCODONE HCL 20 MG/1
20 TABLET, FILM COATED, EXTENDED RELEASE ORAL EVERY 12 HOURS
Qty: 60 TAB | Refills: 0 | Status: ON HOLD | OUTPATIENT
Start: 2018-01-01 | End: 2017-01-01 | Stop reason: SDUPTHER

## 2017-01-01 RX ORDER — OXYCODONE HCL 20 MG/1
20 TABLET, FILM COATED, EXTENDED RELEASE ORAL EVERY 12 HOURS
Qty: 60 TAB | Refills: 0 | Status: SHIPPED | OUTPATIENT
Start: 2017-01-01 | End: 2018-01-01 | Stop reason: ALTCHOICE

## 2017-01-01 RX ORDER — LEVOFLOXACIN 750 MG/1
750 TABLET ORAL DAILY
Qty: 7 TAB | Refills: 0 | Status: SHIPPED | OUTPATIENT
Start: 2017-01-01 | End: 2017-01-01

## 2017-01-01 RX ORDER — MIRTAZAPINE 15 MG/1
15 TABLET, ORALLY DISINTEGRATING ORAL
Status: DISCONTINUED | OUTPATIENT
Start: 2017-01-01 | End: 2017-01-01

## 2017-01-01 RX ORDER — OXYCODONE HCL 20 MG/1
20 TABLET, FILM COATED, EXTENDED RELEASE ORAL EVERY 12 HOURS
Qty: 60 TAB | Refills: 0 | Status: SHIPPED | OUTPATIENT
Start: 2017-01-01 | End: 2017-01-01

## 2017-01-01 RX ORDER — MIRTAZAPINE 15 MG/1
TABLET, ORALLY DISINTEGRATING ORAL
Qty: 30 TAB | Refills: 3 | Status: SHIPPED | OUTPATIENT
Start: 2017-01-01 | End: 2017-01-01 | Stop reason: ALTCHOICE

## 2017-01-01 RX ORDER — LIDOCAINE 50 MG/G
1 PATCH TOPICAL
Status: DISCONTINUED | OUTPATIENT
Start: 2017-01-01 | End: 2017-01-01 | Stop reason: HOSPADM

## 2017-01-01 RX ORDER — NITROGLYCERIN 0.4 MG/1
0.4 TABLET SUBLINGUAL AS NEEDED
Status: DISCONTINUED | OUTPATIENT
Start: 2017-01-01 | End: 2017-01-01 | Stop reason: HOSPADM

## 2017-01-01 RX ORDER — NALOXONE HYDROCHLORIDE 4 MG/.1ML
SPRAY NASAL
Qty: 1 BOX | Refills: 0 | Status: SHIPPED | OUTPATIENT
Start: 2017-01-01 | End: 2017-01-01 | Stop reason: ALTCHOICE

## 2017-01-01 RX ORDER — POTASSIUM CHLORIDE 750 MG/1
10 TABLET, EXTENDED RELEASE ORAL DAILY
Status: DISCONTINUED | OUTPATIENT
Start: 2017-01-01 | End: 2017-01-01 | Stop reason: HOSPADM

## 2017-01-01 RX ORDER — POTASSIUM CHLORIDE 750 MG/1
TABLET, EXTENDED RELEASE ORAL
Qty: 60 TAB | Refills: 1 | Status: ON HOLD | OUTPATIENT
Start: 2017-01-01 | End: 2017-01-01

## 2017-01-01 RX ORDER — INDAPAMIDE 2.5 MG/1
1.25 TABLET, FILM COATED ORAL DAILY
Status: DISCONTINUED | OUTPATIENT
Start: 2017-01-01 | End: 2017-01-01

## 2017-01-01 RX ORDER — OXYCODONE HCL 20 MG/1
20 TABLET, FILM COATED, EXTENDED RELEASE ORAL EVERY 12 HOURS
Qty: 60 TAB | Refills: 0 | Status: SHIPPED | OUTPATIENT
Start: 2018-01-01 | End: 2017-01-01 | Stop reason: SDUPTHER

## 2017-01-01 RX ORDER — ALLOPURINOL 100 MG/1
300 TABLET ORAL DAILY
Status: DISCONTINUED | OUTPATIENT
Start: 2017-01-01 | End: 2017-01-01 | Stop reason: HOSPADM

## 2017-01-01 RX ORDER — INDAPAMIDE 1.25 MG/1
TABLET, FILM COATED ORAL DAILY
COMMUNITY
End: 2017-01-01 | Stop reason: ALTCHOICE

## 2017-01-01 RX ORDER — DONEPEZIL HYDROCHLORIDE 5 MG/1
10 TABLET, FILM COATED ORAL
Status: DISCONTINUED | OUTPATIENT
Start: 2017-01-01 | End: 2017-01-01

## 2017-01-01 RX ORDER — HALOPERIDOL 0.5 MG/1
TABLET ORAL
Qty: 30 TAB | Refills: 2 | Status: SHIPPED | OUTPATIENT
Start: 2017-01-01 | End: 2017-01-01 | Stop reason: SDUPTHER

## 2017-01-01 RX ORDER — FUROSEMIDE 20 MG/1
TABLET ORAL DAILY
COMMUNITY
End: 2017-01-01 | Stop reason: SDUPTHER

## 2017-01-01 RX ORDER — SIMVASTATIN 40 MG/1
40 TABLET, FILM COATED ORAL
Status: DISCONTINUED | OUTPATIENT
Start: 2017-01-01 | End: 2017-01-01 | Stop reason: HOSPADM

## 2017-01-01 RX ORDER — DONEPEZIL HYDROCHLORIDE 10 MG/1
10 TABLET, FILM COATED ORAL
COMMUNITY
End: 2017-01-01

## 2017-01-01 RX ORDER — ASPIRIN 81 MG/1
81 TABLET ORAL 2 TIMES DAILY
Status: DISCONTINUED | OUTPATIENT
Start: 2017-01-01 | End: 2017-01-01 | Stop reason: HOSPADM

## 2017-01-01 RX ORDER — OXYCODONE AND ACETAMINOPHEN 10; 325 MG/1; MG/1
1 TABLET ORAL
Qty: 90 TAB | Refills: 0 | Status: ON HOLD | OUTPATIENT
Start: 2017-01-01 | End: 2017-01-01

## 2017-01-01 RX ORDER — OXYCODONE AND ACETAMINOPHEN 10; 325 MG/1; MG/1
1 TABLET ORAL
Qty: 90 TAB | Refills: 0 | OUTPATIENT
Start: 2017-01-01

## 2017-01-01 RX ORDER — DONEPEZIL HYDROCHLORIDE 10 MG/1
10 TABLET, FILM COATED ORAL
Qty: 30 TAB | Refills: 0 | Status: SHIPPED | OUTPATIENT
Start: 2017-01-01 | End: 2018-01-01 | Stop reason: SDUPTHER

## 2017-01-01 RX ORDER — OXYCODONE AND ACETAMINOPHEN 10; 325 MG/1; MG/1
1 TABLET ORAL
Qty: 90 TAB | Refills: 0 | Status: SHIPPED | OUTPATIENT
Start: 2017-01-01 | End: 2018-01-01

## 2017-01-01 RX ORDER — OXYCODONE HCL 20 MG/1
20 TABLET, FILM COATED, EXTENDED RELEASE ORAL EVERY 12 HOURS
Qty: 60 TAB | Refills: 0 | Status: SHIPPED | OUTPATIENT
Start: 2017-01-01 | End: 2017-01-01 | Stop reason: ALTCHOICE

## 2017-01-01 RX ORDER — HALOPERIDOL 0.5 MG/1
TABLET ORAL
Qty: 30 TAB | Refills: 2 | Status: SHIPPED | OUTPATIENT
Start: 2017-01-01 | End: 2018-01-01 | Stop reason: SDUPTHER

## 2017-01-01 RX ORDER — METOPROLOL TARTRATE 50 MG/1
50 TABLET ORAL 2 TIMES DAILY
Status: DISCONTINUED | OUTPATIENT
Start: 2017-01-01 | End: 2017-01-01

## 2017-01-01 RX ORDER — OXYCODONE AND ACETAMINOPHEN 10; 325 MG/1; MG/1
1 TABLET ORAL
Qty: 90 TAB | Refills: 0 | Status: ON HOLD | OUTPATIENT
Start: 2018-01-01 | End: 2017-01-01 | Stop reason: SDUPTHER

## 2017-01-01 RX ORDER — CALCIUM CARB/MAGNESIUM CARB 311-232MG
10 TABLET ORAL
Status: DISCONTINUED | OUTPATIENT
Start: 2017-01-01 | End: 2017-01-01 | Stop reason: CLARIF

## 2017-01-01 RX ORDER — OXYCODONE HCL 10 MG/1
10 TABLET, FILM COATED, EXTENDED RELEASE ORAL EVERY 12 HOURS
Status: DISCONTINUED | OUTPATIENT
Start: 2017-01-01 | End: 2017-01-01 | Stop reason: HOSPADM

## 2017-01-01 RX ADMIN — SIMVASTATIN 40 MG: 40 TABLET, FILM COATED ORAL at 21:50

## 2017-01-01 RX ADMIN — OXYCODONE HYDROCHLORIDE AND ACETAMINOPHEN 1 TABLET: 5; 325 TABLET ORAL at 19:28

## 2017-01-01 RX ADMIN — ASPIRIN 81 MG: 81 TABLET, COATED ORAL at 09:17

## 2017-01-01 RX ADMIN — POTASSIUM CHLORIDE 10 MEQ: 10 TABLET, EXTENDED RELEASE ORAL at 10:55

## 2017-01-01 RX ADMIN — ALLOPURINOL 300 MG: 100 TABLET ORAL at 10:20

## 2017-01-01 RX ADMIN — PIPERACILLIN AND TAZOBACTAM 4.5 G: 4; .5 INJECTION, POWDER, LYOPHILIZED, FOR SOLUTION INTRAVENOUS; PARENTERAL at 16:38

## 2017-01-01 RX ADMIN — SIMVASTATIN 40 MG: 40 TABLET, FILM COATED ORAL at 23:16

## 2017-01-01 RX ADMIN — KETOROLAC TROMETHAMINE 15 MG: 30 INJECTION, SOLUTION INTRAMUSCULAR at 00:00

## 2017-01-01 RX ADMIN — ASPIRIN 81 MG: 81 TABLET, COATED ORAL at 16:51

## 2017-01-01 RX ADMIN — POTASSIUM CHLORIDE 10 MEQ: 10 TABLET, EXTENDED RELEASE ORAL at 10:15

## 2017-01-01 RX ADMIN — ASPIRIN 81 MG: 81 TABLET, COATED ORAL at 08:47

## 2017-01-01 RX ADMIN — SODIUM CHLORIDE AND POTASSIUM CHLORIDE: 9; 1.49 INJECTION, SOLUTION INTRAVENOUS at 17:48

## 2017-01-01 RX ADMIN — OXYCODONE HYDROCHLORIDE AND ACETAMINOPHEN 1 TABLET: 5; 325 TABLET ORAL at 03:48

## 2017-01-01 RX ADMIN — ASPIRIN 81 MG: 81 TABLET, COATED ORAL at 17:50

## 2017-01-01 RX ADMIN — ALLOPURINOL 300 MG: 100 TABLET ORAL at 17:50

## 2017-01-01 RX ADMIN — ALLOPURINOL 300 MG: 100 TABLET ORAL at 08:47

## 2017-01-01 RX ADMIN — POTASSIUM CHLORIDE 10 MEQ: 10 TABLET, EXTENDED RELEASE ORAL at 09:16

## 2017-01-01 RX ADMIN — KETOROLAC TROMETHAMINE 15 MG: 30 INJECTION, SOLUTION INTRAMUSCULAR at 08:46

## 2017-01-01 RX ADMIN — KETOROLAC TROMETHAMINE 15 MG: 30 INJECTION, SOLUTION INTRAMUSCULAR at 10:16

## 2017-01-01 RX ADMIN — PIPERACILLIN AND TAZOBACTAM 4.5 G: 4; .5 INJECTION, POWDER, LYOPHILIZED, FOR SOLUTION INTRAVENOUS; PARENTERAL at 04:23

## 2017-01-01 RX ADMIN — SIMVASTATIN 40 MG: 40 TABLET, FILM COATED ORAL at 21:15

## 2017-01-01 RX ADMIN — ASPIRIN 81 MG: 81 TABLET, COATED ORAL at 17:41

## 2017-01-01 RX ADMIN — ASPIRIN 81 MG: 81 TABLET, COATED ORAL at 10:15

## 2017-01-01 RX ADMIN — SODIUM CHLORIDE 1250 MG: 900 INJECTION, SOLUTION INTRAVENOUS at 01:04

## 2017-01-01 RX ADMIN — VANCOMYCIN HYDROCHLORIDE 1750 MG: 1 INJECTION, POWDER, LYOPHILIZED, FOR SOLUTION INTRAVENOUS at 12:00

## 2017-01-01 RX ADMIN — ALLOPURINOL 300 MG: 100 TABLET ORAL at 10:15

## 2017-01-01 RX ADMIN — IOPAMIDOL 100 ML: 612 INJECTION, SOLUTION INTRAVENOUS at 10:33

## 2017-01-01 RX ADMIN — LEVOFLOXACIN 750 MG: 5 INJECTION, SOLUTION INTRAVENOUS at 09:16

## 2017-01-01 RX ADMIN — KETOROLAC TROMETHAMINE 15 MG: 30 INJECTION, SOLUTION INTRAMUSCULAR at 17:42

## 2017-01-01 RX ADMIN — OXYCODONE HYDROCHLORIDE 10 MG: 10 TABLET, FILM COATED, EXTENDED RELEASE ORAL at 09:22

## 2017-01-01 RX ADMIN — PIPERACILLIN AND TAZOBACTAM 4.5 G: 4; .5 INJECTION, POWDER, LYOPHILIZED, FOR SOLUTION INTRAVENOUS; PARENTERAL at 22:15

## 2017-01-01 RX ADMIN — KETOROLAC TROMETHAMINE 15 MG: 30 INJECTION, SOLUTION INTRAMUSCULAR at 21:50

## 2017-01-01 RX ADMIN — ASPIRIN 81 MG: 81 TABLET, COATED ORAL at 17:27

## 2017-01-01 RX ADMIN — Medication 10 MG: at 23:45

## 2017-01-01 RX ADMIN — SODIUM CHLORIDE AND POTASSIUM CHLORIDE: 9; 1.49 INJECTION, SOLUTION INTRAVENOUS at 23:16

## 2017-01-01 RX ADMIN — SODIUM CHLORIDE 2000 ML: 900 INJECTION, SOLUTION INTRAVENOUS at 08:56

## 2017-01-01 RX ADMIN — LEVOFLOXACIN 750 MG: 5 INJECTION, SOLUTION INTRAVENOUS at 10:17

## 2017-01-01 RX ADMIN — LEVOFLOXACIN 750 MG: 5 INJECTION, SOLUTION INTRAVENOUS at 08:47

## 2017-01-01 RX ADMIN — PIPERACILLIN AND TAZOBACTAM 4.5 G: 4; .5 INJECTION, POWDER, LYOPHILIZED, FOR SOLUTION INTRAVENOUS; PARENTERAL at 11:38

## 2017-01-01 RX ADMIN — SODIUM CHLORIDE AND POTASSIUM CHLORIDE: 9; 1.49 INJECTION, SOLUTION INTRAVENOUS at 09:15

## 2017-01-01 RX ADMIN — OXYCODONE HYDROCHLORIDE AND ACETAMINOPHEN 1 TABLET: 5; 325 TABLET ORAL at 19:10

## 2017-01-04 ENCOUNTER — OFFICE VISIT (OUTPATIENT)
Dept: ORTHOPEDIC SURGERY | Age: 82
End: 2017-01-04

## 2017-01-04 VITALS
SYSTOLIC BLOOD PRESSURE: 116 MMHG | HEIGHT: 71 IN | RESPIRATION RATE: 18 BRPM | HEART RATE: 66 BPM | DIASTOLIC BLOOD PRESSURE: 55 MMHG | TEMPERATURE: 98.2 F | BODY MASS INDEX: 27.47 KG/M2 | OXYGEN SATURATION: 98 % | WEIGHT: 196.2 LBS

## 2017-01-04 DIAGNOSIS — M48.10 DISH (DIFFUSE IDIOPATHIC SKELETAL HYPEROSTOSIS): Primary | Chronic | ICD-10-CM

## 2017-01-04 RX ORDER — OXYCODONE AND ACETAMINOPHEN 10; 325 MG/1; MG/1
1 TABLET ORAL
Qty: 90 TAB | Refills: 0 | Status: SHIPPED | OUTPATIENT
Start: 2017-02-04 | End: 2017-03-30 | Stop reason: ALTCHOICE

## 2017-01-04 RX ORDER — OXYCODONE HCL 20 MG/1
TABLET, FILM COATED, EXTENDED RELEASE ORAL
Qty: 60 TAB | Refills: 0 | Status: SHIPPED | OUTPATIENT
Start: 2017-01-05 | End: 2017-03-30 | Stop reason: SDUPTHER

## 2017-01-04 RX ORDER — OXYCODONE HCL 20 MG/1
TABLET, FILM COATED, EXTENDED RELEASE ORAL
Qty: 60 TAB | Refills: 0 | Status: SHIPPED | OUTPATIENT
Start: 2017-02-04 | End: 2017-03-30 | Stop reason: SDUPTHER

## 2017-01-04 RX ORDER — OXYCODONE AND ACETAMINOPHEN 10; 325 MG/1; MG/1
1 TABLET ORAL
Qty: 90 TAB | Refills: 0 | Status: SHIPPED | OUTPATIENT
Start: 2017-01-05 | End: 2017-03-30 | Stop reason: ALTCHOICE

## 2017-01-04 RX ORDER — OXYCODONE AND ACETAMINOPHEN 10; 325 MG/1; MG/1
1 TABLET ORAL
Qty: 90 TAB | Refills: 0 | Status: SHIPPED | OUTPATIENT
Start: 2017-03-06 | End: 2017-03-30 | Stop reason: ALTCHOICE

## 2017-01-04 RX ORDER — OXYCODONE HCL 20 MG/1
TABLET, FILM COATED, EXTENDED RELEASE ORAL
Qty: 60 TAB | Refills: 0 | Status: SHIPPED | OUTPATIENT
Start: 2017-03-06 | End: 2017-04-05 | Stop reason: SDUPTHER

## 2017-01-04 NOTE — MR AVS SNAPSHOT
Visit Information Date & Time Provider Department Dept. Phone Encounter #  
 2017  2:15 PM Marget Apgar, MD  [facility] [phone] [id number] Follow-up Instructions Return in about 3 months (around 2017). Your Appointments 2/3/2017  8:20 AM  
Follow Up with Deloris Srinivasan DO [facility] Appt Note: Return in about 6 months;  confirmed. .s. b; Return in about 6 months/rescheduled with the pt's spouse Michel [address] [address] [id number] [phone] [address]  
  
    
 3/30/2017  1:00 PM  
Follow Up with Gela Lopez MD  
[facility] Appt Note: 5 mos [address], [address]  
  
   
 [address], [address] [facility] [address] Upcoming Health Maintenance Date Due DTaP/Tdap/Td series (1 - Tdap) 1956 MEDICARE YEARLY EXAM 2002 Pneumococcal 65+ Low/Medium Risk (2 of 2 - PCV13) 2017 GLAUCOMA SCREENING Q2Y 2018 Allergies as of 2017  Review Complete On: 2017 By: Blane Rinne, LPN No Known Allergies Current Immunizations  Reviewed on 2016 Name Date Influenza Vaccine (Quad) PF 2016, 2015 Pneumococcal Polysaccharide (PPSV-23) 2016 Zoster Vaccine, Live 2016 Not reviewed this visit You Were Diagnosed With   
  
 Codes Comments DISH (diffuse idiopathic skeletal hyperostosis)    -  Primary ICD-10-CM: M48.10 ICD-9-CM: 721.6 Vitals BP Pulse Temp Resp Height(growth percentile) Weight(growth percentile) 116/55 66 98.2 °F (36.8 °C) (Oral) 18 5' 11\" (1.803 m) 196 lb 3.2 oz (89 kg) SpO2 BMI Smoking Status 98% 27.36 kg/m2 Never Smoker BMI and BSA Data Body Mass Index Body Surface Area  
 27.36 kg/m 2 2.11 m 2 Preferred Pharmacy Pharmacy Name Phone [address], [address] [place] [address] [phone] Your Updated Medication List  
  
   
This list is accurate as of: 1/4/17  3:31 PM.  Always use your most recent med list.  
  
  
  
  
 allopurinol 300 mg tablet Commonly known as:  ZYLOPRIM  
TAKE 1 TABLET BY MOUTH ONCE DAILY  
  
 aspirin delayed-release 81 mg tablet Take 81 mg by mouth daily. azithromycin 250 mg tablet Commonly known as:  Catarina Collar Take 2 tablets today, then take 1 tablet daily CENTRUM SILVER PO Take 1 Tab by mouth daily. fluticasone 50 mcg/actuation nasal spray Commonly known as:  FLONASE  
1 spray each nostril daily  
  
 guaiFENesin-codeine 100-10 mg/5 mL solution Commonly known as:  ROBITUSSIN AC Take 5 mL by mouth three (3) times daily as needed for Cough. Max Daily Amount: 15 mL. Do not drive if taking this medication  
  
 indapamide 1.25 mg tablet Commonly known as:  LOZOL  
TAKE 1 TABLET BY MOUTH ONCE DAILY  
  
 metoprolol tartrate 50 mg tablet Commonly known as:  LOPRESSOR  
TAKE 1 TABLET BY MOUTH 2 TIMES A DAY  
  
 NAMENDA XR 28 mg capsule Generic drug:  memantine ER  
  
 niacin 1,000 mg Tb24 tab Commonly known as:  Nadzain Melanie Take 1 tablet by mouth nightly. NITROSTAT 0.4 mg SL tablet Generic drug:  nitroglycerin DISSOLVE 1 TABLET UNDER THE TONGUE EVERY 5 MINUTES AS NEEDED  
  
 * oxyCODONE ER 20 mg ER tablet Commonly known as:  OxyCONTIN  
1 tab PO QAM  
  
 * oxyCODONE ER 20 mg ER tablet Commonly known as:  OxyCONTIN Take 1 po every 12 hours for chronic pain Start taking on:  1/5/2017 * oxyCODONE ER 20 mg ER tablet Commonly known as:  OxyCONTIN Take 1 po every 12 hour for chronic pain Start taking on:  2/4/2017 * oxyCODONE ER 20 mg ER tablet Commonly known as:  OxyCONTIN  
 Take 1 po every 12 hours for chronic pain Start taking on:  3/6/2017 * oxyCODONE-acetaminophen  mg per tablet Commonly known as:  PERCOCET 10  
1 tab PO QID PRN breakthrough pain * oxyCODONE-acetaminophen  mg per tablet Commonly known as:  PERCOCET Take 1 Tab by mouth three (3) times daily as needed for Pain. Max Daily Amount: 3 Tabs. Start taking on:  1/5/2017 * oxyCODONE-acetaminophen  mg per tablet Commonly known as:  PERCOCET Take 1 Tab by mouth three (3) times daily as needed for Pain. Max Daily Amount: 3 Tabs. Start taking on:  2/4/2017 * oxyCODONE-acetaminophen  mg per tablet Commonly known as:  PERCOCET Take 1 Tab by mouth three (3) times daily as needed for Pain. Max Daily Amount: 3 Tabs. Start taking on:  3/6/2017 potassium chloride 10 mEq tablet Commonly known as:  K-DUR, KLOR-CON  
TAKE 1 TABLET BY MOUTH 2 TIMES A DAY  
  
 simvastatin 40 mg tablet Commonly known as:  ZOCOR  
TAKE 1 TABLET BY MOUTH ONCE DAILY * Notice: This list has 8 medication(s) that are the same as other medications prescribed for you. Read the directions carefully, and ask your doctor or other care provider to review them with you. Prescriptions Printed Refills  
 oxyCODONE ER (OXYCONTIN) 20 mg ER tablet 0 Starting on: 1/5/2017 Sig: Take 1 po every 12 hours for chronic pain  
 Class: Print  
 oxyCODONE ER (OXYCONTIN) 20 mg ER tablet 0 Starting on: 2/4/2017 Sig: Take 1 po every 12 hour for chronic pain  
 Class: Print  
 oxyCODONE ER (OXYCONTIN) 20 mg ER tablet 0 Starting on: 3/6/2017 Sig: Take 1 po every 12 hours for chronic pain  
 Class: Print  
 oxyCODONE-acetaminophen (PERCOCET)  mg per tablet 0 Starting on: 1/5/2017 Sig: Take 1 Tab by mouth three (3) times daily as needed for Pain. Max Daily Amount: 3 Tabs. Class: Print  Route: Oral  
 oxyCODONE-acetaminophen (PERCOCET)  mg per tablet 0 Starting on: 2017 Sig: Take 1 Tab by mouth three (3) times daily as needed for Pain. Max Daily Amount: 3 Tabs. Class: Print Route: Oral  
 oxyCODONE-acetaminophen (PERCOCET)  mg per tablet 0 Starting on: 3/6/2017 Sig: Take 1 Tab by mouth three (3) times daily as needed for Pain. Max Daily Amount: 3 Tabs. Class: Print Route: Oral  
  
Follow-up Instructions Return in about 3 months (around 2017). Patient Instructions [employer] Activation Thank you for requesting access to [employer]. Please follow the instructions below to securely access and download your online medical record. [employer] allows you to send messages to your doctor, view your test results, renew your prescriptions, schedule appointments, and more. How Do I Sign Up? 1. In your internet browser, go to [email or web address]) 
2. Click on the First Time User? Click Here link in the Sign In box. You will be redirect to the New Member Sign Up page. 3. Enter your [employer] Access Code exactly as it appears below. You will not need to use this code after youve completed the sign-up process. If you do not sign up before the expiration date, you must request a new code. [employer] Access Code: [email or web address] Expires: 2017  2:14 PM (This is the date your [employer] access code will ) 4. Enter the last four digits of your Social Security Number (xxxx) and Date of Birth (mm/dd/yyyy) as indicated and click Submit. You will be taken to the next sign-up page. 5. Create a [employer] ID. This will be your [employer] login ID and cannot be changed, so think of one that is secure and easy to remember. 6. Create a [employer] password. You can change your password at any time. 7. Enter your Password Reset Question and Answer. This can be used at a later time if you forget your password. 8. Enter your e-mail address. You will receive e-mail notification when new information is available in [address] 9. Click Sign Up. You can now view and download portions of your medical record. 10. Click the Download Summary menu link to download a portable copy of your medical information. Additional Information If you have questions, please visit the Frequently Asked Questions section of the [employer] website at [email or web address] Remember, [employer] is NOT to be used for urgent needs. For medical emergencies, dial 911. Chronic Pain: Care Instructions Your Care Instructions Chronic pain is pain that lasts a long time (months or even years) and may or may not have a clear cause. It is different from acute pain, which usually does have a clear causelike an injury or illnessand gets better over time. Chronic pain: 
· Lasts over time but may vary from day to day. · Does not go away despite efforts to end it. · May disrupt your sleep and lead to fatigue. · May cause depression or anxiety. · May make your muscles tense, causing more pain. · Can disrupt your work, hobbies, home life, and relationships with friends and family. Chronic pain is a very real condition. It is not just in your head. Treatment can help and usually includes several methods used together, such as medicines, physical therapy, exercise, and other treatments. Learning how to relax and changing negative thought patterns can also help you cope. Chronic pain is complex. Taking an active role in your treatment will help you better manage your pain. Tell your doctor if you have trouble dealing with your pain. You may have to try several things before you find what works best for you. Follow-up care is a key part of your treatment and safety. Be sure to make and go to all appointments, and call your doctor if you are having problems.  Its also a good idea to know your test results and keep a list of the medicines you take. How can you care for yourself at home? · Pace yourself. Break up large jobs into smaller tasks. Save harder tasks for days when you have less pain, or go back and forth between hard tasks and easier ones. Take rest breaks. · Relax, and reduce stress. Relaxation techniques such as deep breathing or meditation can help. · Keep moving. Gentle, daily exercise can help reduce pain over the long run. Try low- or no-impact exercises such as walking, swimming, and stationary biking. Do stretches to stay flexible. · Try heat, cold packs, and massage. · Get enough sleep. Chronic pain can make you tired and drain your energy. Talk with your doctor if you have trouble sleeping because of pain. · Think positive. Your thoughts can affect your pain level. Do things that you enjoy to distract yourself when you have pain instead of focusing on the pain. See a movie, read a book, listen to music, or spend time with a friend. · If you think you are depressed, talk to your doctor about treatment. · Keep a daily pain diary. Record how your moods, thoughts, sleep patterns, activities, and medicine affect your pain. You may find that your pain is worse during or after certain activities or when you are feeling a certain emotion. Having a record of your pain can help you and your doctor find the best ways to treat your pain. · Take pain medicines exactly as directed. ¨ If the doctor gave you a prescription medicine for pain, take it as prescribed. ¨ If you are not taking a prescription pain medicine, ask your doctor if you can take an over-the-counter medicine. Reducing constipation caused by pain medicine · Include fruits, vegetables, beans, and whole grains in your diet each day. These foods are high in fiber. · Drink plenty of fluids, enough so that your urine is light yellow or clear like water.  If you have kidney, heart, or liver disease and have to limit fluids, talk with your doctor before you increase the amount of fluids you drink. · If your doctor recommends it, get more exercise. Walking is a good choice. Bit by bit, increase the amount you walk every day. Try for at least 30 minutes on most days of the week. · Schedule time each day for a bowel movement. A daily routine may help. Take your time and do not strain when having a bowel movement. When should you call for help? Call your doctor now or seek immediate medical care if: 
· Your pain gets worse or is out of control. · You feel down or blue, or you do not enjoy things like you once did. You may be depressed, which is common in people with chronic pain. Depression can be treated. · You have vomiting or cramps for more than 2 hours. Watch closely for changes in your health, and be sure to contact your doctor if: 
· You cannot sleep because of pain. · You are very worried or anxious about your pain. · You have trouble taking your pain medicine. · You have any concerns about your pain medicine. · You have trouble with bowel movements, such as: 
¨ No bowel movement in 3 days. ¨ Blood in the anal area, in your stool, or on the toilet paper. ¨ Diarrhea for more than 24 hours. Where can you learn more? Go to http://pablo-isrrael.info/. Enter N004 in the search box to learn more about \"Chronic Pain: Care Instructions. \" Current as of: February 19, 2016 Content Version: 11.1 © 5726-3015 [employer]. Care instructions adapted under license by [employer] (which disclaims liability or warranty for this information). If you have questions about a medical condition or this instruction, always ask your healthcare professional. [place] [address] any warranty or liability for your use of this information. Introducing [address]!    
 [employer] introduces [employer] patient portal. Now you can access parts of your medical record, email your doctor's office, and request medication refills online. 1. In your internet browser, go to [email or web address] 2. Click on the First Time User? Click Here link in the Sign In box. You will see the New Member Sign Up page. 3. Enter your [facility] Access Code exactly as it appears below. You will not need to use this code after youve completed the sign-up process. If you do not sign up before the expiration date, you must request a new code. · [facility] Access Code: [email or web address] Expires: 4/4/2017  3:10 PM 
 
4. Enter the last four digits of your Social Security Number (xxxx) and Date of Birth (mm/dd/yyyy) as indicated and click Submit. You will be taken to the next sign-up page. 5. Create a [facility] ID. This will be your [facility] login ID and cannot be changed, so think of one that is secure and easy to remember. 6. Create a [facility] password. You can change your password at any time. 7. Enter your Password Reset Question and Answer. This can be used at a later time if you forget your password. 8. Enter your e-mail address. You will receive e-mail notification when new information is available in [address] 9. Click Sign Up. You can now view and download portions of your medical record. 10. Click the Download Summary menu link to download a portable copy of your medical information. If you have questions, please visit the Frequently Asked Questions section of the [facility] website. Remember, [facility] is NOT to be used for urgent needs. For medical emergencies, dial 911. Now available from your iPhone and Android! Please provide this summary of care documentation to your next provider. Your primary care clinician is listed as Kathy Silva. If you have any questions after today's visit, please call [phone].

## 2017-01-04 NOTE — PROGRESS NOTES
[address]  [address], [address]  [place], [address] [place] [address] Street  Phone: [phone]  Fax: [phone]        Robert Corona  : 1935  PCP: Judy Shea MD    PROGRESS NOTE      ASSESSMENT AND PLAN    Jaswinder Hector was seen today for back pain. Diagnoses and all orders for this visit:    DISH (diffuse idiopathic skeletal hyperostosis)  -     oxyCODONE ER (OXYCONTIN) 20 mg ER tablet; Take 1 po every 12 hours for chronic pain  -     oxyCODONE ER (OXYCONTIN) 20 mg ER tablet; Take 1 po every 12 hour for chronic pain  -     oxyCODONE ER (OXYCONTIN) 20 mg ER tablet; Take 1 po every 12 hours for chronic pain  -     oxyCODONE-acetaminophen (PERCOCET)  mg per tablet; Take 1 Tab by mouth three (3) times daily as needed for Pain. Max Daily Amount: 3 Tabs. -     oxyCODONE-acetaminophen (PERCOCET)  mg per tablet; Take 1 Tab by mouth three (3) times daily as needed for Pain. Max Daily Amount: 3 Tabs. -     oxyCODONE-acetaminophen (PERCOCET)  mg per tablet; Take 1 Tab by mouth three (3) times daily as needed for Pain. Max Daily Amount: 3 Tabs. 1.  Resume Oxycontin Q12 dosing. Wife to provide management of Percocet for BTP  2. Advised to try golfing again. 3. Given care instructions for chronic pain. Follow-up Disposition:  Return in about 3 months (around 2017). Risks and benefits of ongoing opiate therapy have been reviewed with the patient.  is appropriate. UDS results have been consistent. No pain behaviors. Pt has a good risk to benefit ratio which allows the pt to function in a home environment without side effects. HISTORY OF PRESENT ILLNESS  Suze Boles. is a 80 y.o. male. Pt presents to the office for a f/u visit for lumbar stenosis. Pt continues to have back pain. He has not been back to golf as he is afraid that the twisting will cause his pain to increase. His back pain bothers him constantly.  When he stands up from a seated position, he experiences a shooting pain into his BLE. Pt reports that his pain mainly stays in his back. He denies any paresthesia in his BLE. He denies any saddle paresthesia. Pt takes Percocet  mg BID with benefit but this wears off in the evening and night. Pt takes Oxycontin qD with benefit. He was unable to take Q12 due to feeling of \"craziness\". His wife controls his pain medications. He notes that he has feels as if he needs to take an extra tab of pain medication. Pt denies any dizziness, confusion, uncontrolled constipation, and cravings due to controlled substances. He used to take some memory aid pills that he feels did not benefit him. Denies persistent fevers, chills, weight changes, neurogenic bowel or bladder symptoms. Pt denies recent ED visits or hospitalizations. He has some slurred speech and memory loss in the last few months due to his stroke. Pt notes that his speech and memory has been getting better. He was given pills to aid in his memory. Pt has a PMHx of dementia. He enjoys gambling. He used to be very good at golf. PMHx of DISH. Discussed with patient and spouse proper use of long acting vs short acting opioids. Pain Assessment  1/4/2017   Location of Pain Back   Severity of Pain 8   Quality of Pain Sharp;Dull;Aching   Duration of Pain Persistent   Frequency of Pain Constant   Aggravating Factors Bending;Kneeling;Squatting;Standing;Walking   Limiting Behavior Yes   Relieving Factors Rest   Relieving Factors Comment -   Result of Injury No       PAST MEDICAL HISTORY   Past Medical History   Diagnosis Date    3-vessel coronary artery disease 02/13/2004     Tech limited. Low normal to mildly depressed LV systolic function. LVH. DDfx. LAE. PASP 25-30.  Abnormal nuclear cardiac imaging test 06/15/2012     Lg area of mod mid to distal anterior ischemia. Lg area of inferior ischemia. EF 57%. Mild mid-distal anterior, inferior hypk.   High risk pharm stress test.    Acute bronchitis     CAD (coronary artery disease)      acute coronary syndrome    Cervical spine pain     Deviated septum      s/p corrective surgery 7/1/2010    Disc disease, degenerative, cervical      joint pain, back pain    Dysphagia, unspecified(787.20)     Echocardiogram 11/16/2014     [place]:  EF 55-60%. DDfx. Normal RVSP. Mild TR. Neg bubble study for atrial shunt. No significant valvular pathology. 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 mLAD 100%. CX diffuse nonobstructive. OM1 patent stent. pRCA 100%. Dbl seq SVG to D1, OM1 100%. SVG to pRPDA 99% (3.5 x 23-mm Xience stent). Patent mid segment stent.   LIMA to LAD ok.     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     Unspecified sinusitis (chronic)     Ventral hernia, unspecified, without mention of obstruction or gangrene      S/p repair 6/2003       Past Surgical History   Procedure Laterality Date    Hx appendectomy      Pr spine surgery procedure unlisted       for crushed disk    Hx coronary artery bypass graft  4/14/94     X4, with LIMA to the LAD, double sequential to the diagonal and circumflex and single to the main right coronary artery    Hx hernia repair  7/2003     two prior hernia repairs in the past    Hx gi  1992     two feet of colon removed    Hx cervical diskectomy  7/07     and fusion    Hx tonsillectomy       as a child    Hx heent  7/1/10     deviated nasal septum surgery    Hx cataract removal       bilateral    Pr cardiac surg procedure unlist  1/04     angioplasty of a proximal left anterior descending    Pr cardiac surg procedure unlist  6/2/08     angioplasty and stenting of totally obstructed saphenous vein graft to the posterior descending artery    Hx heart catheterization  6/02/08    Hx heart catheterization  6/20/2012    Hx ptca  6/20/2012    Hx coronary stent placement  6/20/2012    Hx other surgical     .      MEDICATIONS      Current Outpatient Prescriptions   Medication Sig Dispense Refill    [START ON 1/5/2017] oxyCODONE ER (OXYCONTIN) 20 mg ER tablet Take 1 po every 12 hours for chronic pain 60 Tab 0    [START ON 2/4/2017] oxyCODONE ER (OXYCONTIN) 20 mg ER tablet Take 1 po every 12 hour for chronic pain 60 Tab 0    [START ON 3/6/2017] oxyCODONE ER (OXYCONTIN) 20 mg ER tablet Take 1 po every 12 hours for chronic pain 60 Tab 0    [START ON 1/5/2017] oxyCODONE-acetaminophen (PERCOCET)  mg per tablet Take 1 Tab by mouth three (3) times daily as needed for Pain. Max Daily Amount: 3 Tabs. 90 Tab 0    [START ON 2/4/2017] oxyCODONE-acetaminophen (PERCOCET)  mg per tablet Take 1 Tab by mouth three (3) times daily as needed for Pain. Max Daily Amount: 3 Tabs. 90 Tab 0    [START ON 3/6/2017] oxyCODONE-acetaminophen (PERCOCET)  mg per tablet Take 1 Tab by mouth three (3) times daily as needed for Pain. Max Daily Amount: 3 Tabs.  90 Tab 0    fluticasone (FLONASE) 50 mcg/actuation nasal spray 1 spray each nostril daily 1 Bottle 1    indapamide (LOZOL) 1.25 mg tablet TAKE 1 TABLET BY MOUTH ONCE DAILY 30 Tab 2    potassium chloride (K-DUR, KLOR-CON) 10 mEq tablet TAKE 1 TABLET BY MOUTH 2 TIMES A DAY 60 Tab 1    simvastatin (ZOCOR) 40 mg tablet TAKE 1 TABLET BY MOUTH ONCE DAILY 30 Tab 6    NAMENDA XR 28 mg capsule   1    oxyCODONE ER (OXYCONTIN) 20 mg ER tablet 1 tab PO QAM 30 Tab 0    oxyCODONE-acetaminophen (PERCOCET 10)  mg per tablet 1 tab PO QID PRN breakthrough pain 120 Tab 0    metoprolol tartrate (LOPRESSOR) 50 mg tablet TAKE 1 TABLET BY MOUTH 2 TIMES A DAY 60 Tab 6    NITROSTAT 0.4 mg SL tablet DISSOLVE 1 TABLET UNDER THE TONGUE EVERY 5 MINUTES AS NEEDED 25 Tab 0    allopurinol (ZYLOPRIM) 300 mg tablet TAKE 1 TABLET BY MOUTH ONCE DAILY 90 Tab 3    niacin (NIASPAN) 1,000 mg Tb24 tab Take 1 tablet by mouth nightly. 30 tablet 11    MULTIVITAMINS W-MINERALS/LUT (CENTRUM SILVER PO) Take 1 Tab by mouth daily.  aspirin delayed-release 81 mg tablet Take 81 mg by mouth daily.  azithromycin (ZITHROMAX) 250 mg tablet Take 2 tablets today, then take 1 tablet daily 6 Tab 0    guaiFENesin-codeine (ROBITUSSIN AC) 100-10 mg/5 mL solution Take 5 mL by mouth three (3) times daily as needed for Cough. Max Daily Amount: 15 mL. Do not drive if taking this medication 118 mL 0        ALLERGIES  No Known Allergies       SOCIAL HISTORY    Social History     Social History    Marital status:      Spouse name: N/A    Number of children: N/A    Years of education: N/A     Occupational History    Not on file. Social History Main Topics    Smoking status: Never Smoker    Smokeless tobacco: Never Used    Alcohol use Yes    Drug use: No    Sexual activity: No     Other Topics Concern    Not on file     Social History Narrative       FAMILY HISTORY    Family History   Problem Relation Age of Onset    Heart Disease Father     Stroke Father        REVIEW OF SYSTEMS  Review of Systems   Constitutional: Negative for chills, fever and weight loss. Respiratory: Negative for shortness of breath. Cardiovascular: Negative for chest pain. Gastrointestinal: Negative for constipation. Negative for fecal incontinence   Genitourinary: Negative for dysuria. Negative for urinary incontinence   Musculoskeletal:        Per HPI   Skin: Negative for rash.    Neurological: Negative for dizziness, tingling, tremors, focal weakness and headaches. Endo/Heme/Allergies: Does not bruise/bleed easily. Psychiatric/Behavioral: The patient does not have insomnia. PHYSICAL EXAMINATION  Visit Vitals    /55    Pulse 66    Temp 98.2 °F (36.8 °C) (Oral)    Resp 18    Ht 5' 11\" (1.803 m)    Wt 196 lb 3.2 oz (89 kg)    SpO2 98%    BMI 27.36 kg/m2         Accompanied by self. Constitutional:  Well developed, well nourished, in no acute distress. Psychiatric: Affect and mood are appropriate. Integumentary: No rashes or abrasions noted on exposed areas. Cardiovascular/Peripheral Vascular: Intact l pulses. No peripheral edema is noted. Lymphatic:  No evidence of lymphedema. No cervical lymphadenopathy. SPINE/MUSCULOSKELETAL EXAM    Cervical spine:  Neck is midline. Normal muscle tone. No focal atrophy is noted. Shoulder ROM intact. Tenderness to palpation at C7. Negative Spurling's sign. Negative Tinel's sign. Negative Ramirez's sign. Sensation grossly intact to light touch. Lumbar spine:  No rash, ecchymosis, or gross obliquity. No fasciculations. No focal atrophy is noted. Tenderness to palpation mid thoracic spine. No tenderness to palpation at the sciatic notch. SI joints non-tender. Trochanters non tender. Sensation grossly intact to light touch. MOTOR:      Biceps  Triceps Deltoids Wrist Ext Wrist Flex Hand Intrin   Right +4/5 +4/5 +4/5 +4/5 +4/5 +4/5   Left +4/5 +4/5 +4/5 +4/5 +4/5 +4/5        Hip Flex  Quads Hamstrings Ankle DF EHL Ankle PF   Right +4/5 +4/5 +4/5 +4/5 +4/5 +4/5   Left +4/5 +4/5 +4/5 +4/5 +4/5 +4/5       DTRs are 2+ biceps, triceps, brachioradialis, patella, and Achilles. Straight Leg raise negative. Squat not tested. Ambulation without assistive device. FWB. Written by Kunal Stuart, as dictated by Brandon Ching MD.    Mr. Makayla Obregon may have a reminder for a \"due or due soon\" health maintenance.  I have asked that he contact his primary care provider for follow-up on this health maintenance.

## 2017-01-04 NOTE — PATIENT INSTRUCTIONS
[facility] Activation    Thank you for requesting access to [facility]. Please follow the instructions below to securely access and download your online medical record. [facility] allows you to send messages to your doctor, view your test results, renew your prescriptions, schedule appointments, and more. How Do I Sign Up? 1. In your internet browser, go to [email or web address]  2. Click on the First Time User? Click Here link in the Sign In box. You will be redirect to the New Member Sign Up page. 3. Enter your [facility] Access Code exactly as it appears below. You will not need to use this code after youve completed the sign-up process. If you do not sign up before the expiration date, you must request a new code. [facility] Access Code: [email or web address]  Expires: 2017  2:14 PM (This is the date your [facility] access code will )    4. Enter the last four digits of your Social Security Number (xxxx) and Date of Birth (mm/dd/yyyy) as indicated and click Submit. You will be taken to the next sign-up page. 5. Create a [facility] ID. This will be your [facility] login ID and cannot be changed, so think of one that is secure and easy to remember. 6. Create a [facility] password. You can change your password at any time. 7. Enter your Password Reset Question and Answer. This can be used at a later time if you forget your password. 8. Enter your e-mail address. You will receive e-mail notification when new information is available in [address] 9. Click Sign Up. You can now view and download portions of your medical record. 10. Click the Download Summary menu link to download a portable copy of your medical information. Additional Information    If you have questions, please visit the Frequently Asked Questions section of the [facility] website at [email or web address]/. Remember, [facility] is NOT to be used for urgent needs. For medical emergencies, dial 911.          Chronic Pain: Care Instructions  Your Care Instructions  Chronic pain is pain that lasts a long time (months or even years) and may or may not have a clear cause. It is different from acute pain, which usually does have a clear causelike an injury or illnessand gets better over time. Chronic pain:  · Lasts over time but may vary from day to day. · Does not go away despite efforts to end it. · May disrupt your sleep and lead to fatigue. · May cause depression or anxiety. · May make your muscles tense, causing more pain. · Can disrupt your work, hobbies, home life, and relationships with friends and family. Chronic pain is a very real condition. It is not just in your head. Treatment can help and usually includes several methods used together, such as medicines, physical therapy, exercise, and other treatments. Learning how to relax and changing negative thought patterns can also help you cope. Chronic pain is complex. Taking an active role in your treatment will help you better manage your pain. Tell your doctor if you have trouble dealing with your pain. You may have to try several things before you find what works best for you. Follow-up care is a key part of your treatment and safety. Be sure to make and go to all appointments, and call your doctor if you are having problems. Its also a good idea to know your test results and keep a list of the medicines you take. How can you care for yourself at home? · Pace yourself. Break up large jobs into smaller tasks. Save harder tasks for days when you have less pain, or go back and forth between hard tasks and easier ones. Take rest breaks. · Relax, and reduce stress. Relaxation techniques such as deep breathing or meditation can help. · Keep moving. Gentle, daily exercise can help reduce pain over the long run. Try low- or no-impact exercises such as walking, swimming, and stationary biking. Do stretches to stay flexible. · Try heat, cold packs, and massage.   · Get enough sleep. Chronic pain can make you tired and drain your energy. Talk with your doctor if you have trouble sleeping because of pain. · Think positive. Your thoughts can affect your pain level. Do things that you enjoy to distract yourself when you have pain instead of focusing on the pain. See a movie, read a book, listen to music, or spend time with a friend. · If you think you are depressed, talk to your doctor about treatment. · Keep a daily pain diary. Record how your moods, thoughts, sleep patterns, activities, and medicine affect your pain. You may find that your pain is worse during or after certain activities or when you are feeling a certain emotion. Having a record of your pain can help you and your doctor find the best ways to treat your pain. · Take pain medicines exactly as directed. ¨ If the doctor gave you a prescription medicine for pain, take it as prescribed. ¨ If you are not taking a prescription pain medicine, ask your doctor if you can take an over-the-counter medicine. Reducing constipation caused by pain medicine  · Include fruits, vegetables, beans, and whole grains in your diet each day. These foods are high in fiber. · Drink plenty of fluids, enough so that your urine is light yellow or clear like water. If you have kidney, heart, or liver disease and have to limit fluids, talk with your doctor before you increase the amount of fluids you drink. · If your doctor recommends it, get more exercise. Walking is a good choice. Bit by bit, increase the amount you walk every day. Try for at least 30 minutes on most days of the week. · Schedule time each day for a bowel movement. A daily routine may help. Take your time and do not strain when having a bowel movement. When should you call for help? Call your doctor now or seek immediate medical care if:  · Your pain gets worse or is out of control. · You feel down or blue, or you do not enjoy things like you once did.  You may be depressed, which is common in people with chronic pain. Depression can be treated. · You have vomiting or cramps for more than 2 hours. Watch closely for changes in your health, and be sure to contact your doctor if:  · You cannot sleep because of pain. · You are very worried or anxious about your pain. · You have trouble taking your pain medicine. · You have any concerns about your pain medicine. · You have trouble with bowel movements, such as:  ¨ No bowel movement in 3 days. ¨ Blood in the anal area, in your stool, or on the toilet paper. ¨ Diarrhea for more than 24 hours. Where can you learn more? Go to http://pablo-isrrael.info/. Enter N004 in the search box to learn more about \"Chronic Pain: Care Instructions. \"  Current as of: February 19, 2016  Content Version: 11.1  © 8739-6813 [employer]. Care instructions adapted under license by [employer] (which disclaims liability or warranty for this information). If you have questions about a medical condition or this instruction, always ask your healthcare professional. [address] any warranty or liability for your use of this information.

## 2017-02-03 ENCOUNTER — OFFICE VISIT (OUTPATIENT)
Dept: CARDIOLOGY CLINIC | Age: 82
End: 2017-02-03

## 2017-02-03 VITALS
BODY MASS INDEX: 28.28 KG/M2 | HEART RATE: 52 BPM | DIASTOLIC BLOOD PRESSURE: 60 MMHG | WEIGHT: 202 LBS | OXYGEN SATURATION: 98 % | HEIGHT: 71 IN | SYSTOLIC BLOOD PRESSURE: 110 MMHG

## 2017-02-03 DIAGNOSIS — I25.10 ATHEROSCLEROSIS OF NATIVE CORONARY ARTERY OF NATIVE HEART WITHOUT ANGINA PECTORIS: Primary | ICD-10-CM

## 2017-02-03 DIAGNOSIS — E78.5 DYSLIPIDEMIA: ICD-10-CM

## 2017-02-03 DIAGNOSIS — I10 ESSENTIAL HYPERTENSION, BENIGN: ICD-10-CM

## 2017-02-03 NOTE — PROGRESS NOTES
HPI: I saw Jill Hashimoto. Juventino Balbuena, in my office today in cardiovascular evaluation regarding his chronic underlying coronary artery disease. Mr. Victor Hugo Mercedes is a very pleasant 80year old white male with history of coronary artery disease, status post coronary artery bypass grafting surgery x four in April of 1994, with several angioplasty and stenting procedures since that time. He had some new onset chest pain in June of 2012 with a markedly abnormal nuclear myocardial perfusion study with subsequent cardiac catheterization demonstrating a totally obstructed left anterior descending, as well as a totally obstructed right coronary artery and totally obstructed saphenous vein graft to the first diagonal and first obtuse marginal branches, together with a 99% stenosis in the proximal segment of the saphenous vein graft to the right posterior descending coronary artery and a patent stent to the mid segment of this vessel with NICOLE 1 flow distally. He subsequently had successful angioplasty and stenting of a saphenous vein graft to the right posterior descending coronary artery with a 3.5 x 22 mm Xience drug eluting stent with 0% residual and excellent distal flow. He comes into the office today with his wife and relates that he is doing well without any problems with chest pain or shortness of breath although he has been having some memory issues and according to his wife it is actually more being able to express himself. He also has some hearing deficit which also adds to the problem, but from a cardiovascular vantage does not appear to be having any symptoms except for mild two-pillow orthopnea. Encounter Diagnoses   Name Primary?  Atherosclerosis of native coronary artery of native heart without angina pectoris Yes    Dyslipidemia     Essential hypertension, benign        Discussion:  This gentleman appears to be doing about as well as we could expect and I really have no recommendations for change at this time. He is not having any symptoms to suggest the development of symptomatic obstructive coronary artery disease or heart failure, but it has been 4-1/2 years since his last stenting procedure and I think we should go ahead and do a screening nuclear myocardial perfusion study to be sure that he is not developing any silent ischemia. His latest lipid profile that I have a copy of was completed on June 24, 2016 showed total cholesterol 135 with triglycerides 153, HDL 41, LDL of 63.4, and VLDL of 30.6 which I think is reasonably good control on his Zocor 40 mg daily. We did briefly discuss the pros and cons of possibly stopping Zocor in view of his memory issues since there has been some concern that statins at times could affect memory, but at this point were going to continue him on the same medication. His blood pressure is well-controlled today and his EKG appears to be stable so I am simply going to plan to see him again in several months or as needed if any new cardiovascular symptoms develop or more quickly obviously if he has a significantly abnormal nuclear myocardial perfusion study. PCP: Bruce Teixeira MD       Past Medical History   Diagnosis Date    3-vessel coronary artery disease 02/13/2004     Tech limited. Low normal to mildly depressed LV systolic function. LVH. DDfx. LAE. PASP 25-30.  Acute bronchitis     CABG (coronary artery bypass graft) 04/14/1994     LIMA-LAD, Double Seq - D and Cx and SVG- RCA    CAD (coronary artery disease)      acute coronary syndrome    Cardiac catheterization 06/20/2012     LM patent. mLAD 100%. CX diffuse nonobstructive. OM1 patent stent. pRCA 100%. Dbl seq SVG to D1, OM1 100%. SVG to pRPDA 99% (3.5 x 23-mm Xience stent). Patent mid segment stent. LIMA to LAD ok.  Cardiac echocardiogram 11/16/2014     [facility]:  EF 55-60%. DDfx. Normal RVSP. Mild TR. Neg bubble study for atrial shunt. No significant valvular pathology.     Cardiac nuclear imaging test, high risk 06/15/2012     Lg area of mod mid to distal anterior ischemia. Lg area of inferior ischemia. EF 57%. Mild mid-distal anterior, inferior hypk.   High risk pharm stress test.    Cervical spine pain     Deviated septum      s/p corrective surgery 7/1/2010    Disc disease, degenerative, cervical      joint pain, back pain    Dysphagia, unspecified(787.20)     Essential hypertension, benign     Gout with other specified manifestations(274.89)     Head injury      frequent headaches    Hypercholesterolemia     Insomnia, unspecified     Lumbar canal stenosis     Meniere's disease     Myocardial infarction, anterior wall (HCC)     DIEGO (obstructive sleep apnea)      on CPAP   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     Unspecified sinusitis (chronic)     Ventral hernia, unspecified, without mention of obstruction or gangrene      S/p repair 6/2003         Past Surgical History   Procedure Laterality Date    Hx appendectomy      Pr spine surgery procedure unlisted       for crushed disk    Hx coronary artery bypass graft  4/14/94     X4, with LIMA to the LAD, double sequential to the diagonal and circumflex and single to the main right coronary artery    Hx hernia repair  7/2003     two prior hernia repairs in the past    Hx gi  1992     two feet of colon removed    Hx cervical diskectomy  7/07     and fusion    Hx tonsillectomy       as a child    Hx heent  7/1/10     deviated nasal septum surgery    Hx cataract removal       bilateral    Pr cardiac surg procedure unlist  1/04     angioplasty of a proximal left anterior descending    Pr cardiac surg procedure unlist  6/2/08     angioplasty and stenting of totally obstructed saphenous vein graft to the posterior descending artery    Hx heart catheterization  6/02/08    Hx heart catheterization  6/20/2012    Hx ptca  6/20/2012    Hx coronary stent placement  6/20/2012    Hx other surgical           Current Outpatient Rx   Name  Route  Sig  Dispense  Refill    NITROSTAT 0.4 mg SL tablet        DISSOLVE 1 TABLET UNDER THE TONGUE EVERY 5 MINUTES AS NEEDED    25 Tab    0      indapamide (LOZOL) 1.25 mg tablet        TAKE 1 TABLET BY MOUTH DAILY    30 Tab    2      oxyCODONE-acetaminophen (PERCOCET)  mg per tablet    Oral    Take 1 Tab by mouth four (4) times daily as needed for Pain. Max Daily Amount: 4 Tabs. 120 Tab    0      simvastatin (ZOCOR) 40 mg tablet        TAKE 1 TABLET BY MOUTH ONCE DAILY    30 Tab    6      ramipril (ALTACE) 10 mg capsule        TAKE 1 CAPSULE BY MOUTH ONCE DAILY    30 Cap    11      allopurinol (ZYLOPRIM) 300 mg tablet        TAKE 1 TABLET BY MOUTH ONCE DAILY    90 Tab    3      metoprolol (LOPRESSOR) 50 mg tablet    Oral    Take 1 Tab by mouth two (2) times a day. 60 Tab    6      niacin (NIASPAN) 1,000 mg Tb24 tab    Oral    Take 1 tablet by mouth nightly. 30 tablet    11      MULTIVITAMINS W-MINERALS/LUT (CENTRUM SILVER PO)    Oral    Take 1 Tab by mouth daily.  aspirin delayed-release 81 mg tablet    Oral    Take 81 mg by mouth daily. No Known Allergies    Social   Social History   Substance Use Topics    Smoking status: Never Smoker    Smokeless tobacco: Never Used    Alcohol use Yes     Family history: family history includes Heart Disease in his father; Stroke in his father. Review of Systems:   Constitutional: Negative. Respiratory: Negative. Cardiovascular: Positive for orthopnea. Gastrointestinal: Negative. Musculoskeletal: Positive for back pain, joint pain and neck pain. Physical Exam:   The patient is an alert, oriented, well developed, well nourished 80 y.o.  male who was in no acute distress at the time of my examination.   Visit Vitals    /60    Pulse (!) 52    Ht 5' 11\" (1.803 m)    Wt 91.6 kg (202 lb)    SpO2 98%    BMI 28.17 kg/m2      BP Readings from Last 3 Encounters:   02/03/17 110/60   01/04/17 116/55   12/22/16 120/64        Wt Readings from Last 3 Encounters:   02/03/17 91.6 kg (202 lb)   01/04/17 89 kg (196 lb 3.2 oz)   12/22/16 91.6 kg (202 lb)     HEENT:  Conjuctiva white, mucosa moist, no pallor or cyanosis. Neck: Rather thick neck that is difficult to evaluate for jugular venous distention. No carotid bruits. Cardiovascular: Symmetrical with good excursion. There is a mid-sternotomy scar from distant past surgery. [place] appears to be displaced between the midclavicular line and anterior axillary line in the 5th left intercostal space. Normal S1 and S2, without appreciable murmurs, rubs, clicks or gallops auscultated. Lungs: Few rales in the right base, otherwise the lungs are clear to auscultation. Abdomen: Protuberant with a midline scar from ventral hernia surgery. Soft, but with mild epigastric tenderness. No masses or organomegaly. Extremities: No edema, with 1-2 + peripheral pulses. Review of Data: Please refer to past medical history for most recent cardiac testing. Lab Results   Component Value Date/Time    Cholesterol, total 135 06/24/2016 03:19 PM    HDL Cholesterol 41 06/24/2016 03:19 PM    LDL, calculated 63.4 06/24/2016 03:19 PM    VLDL, calculated 30.6 06/24/2016 03:19 PM    Triglyceride 153 06/24/2016 03:19 PM    CHOL/HDL Ratio 3.3 06/24/2016 03:19 PM       Results for orders placed or performed in visit on 02/03/17   AMB POC EKG ROUTINE W/ 12 LEADS, INTER & REP     Status: None    Narrative    Sinus bradycardia, rate 52. Some very minor nonspecific ST changes, but this EKG is otherwise within normal limits and similar to the EKG of June 13, 2016. Rebecca Rizvi D.O., F.A.C.C. [facility]  [facility] [place]  [address] [place] [address].   [address] [address] formerly [facility]    PLEASE NOTE:  This document has been produced using voice recognition software. Unrecognized errors in transcription may be present.

## 2017-02-03 NOTE — PROGRESS NOTES
1. Have you been to the ER, urgent care clinic since your last visit? Hospitalized since your last visit? no  2. Have you seen or consulted any other health care providers outside of the [address] [place] [address] since your last visit? Include any pap smears or colon screening.  no

## 2017-02-03 NOTE — PROGRESS NOTES
Review of Systems   Constitutional: Negative. Respiratory: Negative. Cardiovascular: Positive for orthopnea. Gastrointestinal: Negative. Musculoskeletal: Positive for back pain, joint pain and neck pain.

## 2017-02-08 RX ORDER — POTASSIUM CHLORIDE 750 MG/1
TABLET, EXTENDED RELEASE ORAL
Qty: 60 TAB | Refills: 1 | Status: SHIPPED | OUTPATIENT
Start: 2017-02-08 | End: 2017-04-10 | Stop reason: SDUPTHER

## 2017-02-10 ENCOUNTER — HOSPITAL ENCOUNTER (OUTPATIENT)
Dept: NON INVASIVE DIAGNOSTICS | Age: 82
Discharge: HOME | End: 2017-02-10
Attending: INTERNAL MEDICINE
Payer: MEDICARE

## 2017-02-10 DIAGNOSIS — E78.5 DYSLIPIDEMIA: ICD-10-CM

## 2017-02-10 DIAGNOSIS — I25.10 ATHEROSCLEROSIS OF NATIVE CORONARY ARTERY OF NATIVE HEART WITHOUT ANGINA PECTORIS: ICD-10-CM

## 2017-02-10 PROCEDURE — 74011250636 HC RX REV CODE- 250/636: Performed by: INTERNAL MEDICINE

## 2017-02-10 PROCEDURE — 78452 HT MUSCLE IMAGE SPECT MULT: CPT

## 2017-02-10 PROCEDURE — 93017 CV STRESS TEST TRACING ONLY: CPT

## 2017-02-10 PROCEDURE — A9500 TC99M SESTAMIBI: HCPCS

## 2017-02-10 RX ORDER — SODIUM CHLORIDE 0.9 % (FLUSH) 0.9 %
10 SYRINGE (ML) INJECTION AS NEEDED
Status: COMPLETED | OUTPATIENT
Start: 2017-02-10 | End: 2017-02-10

## 2017-02-10 RX ADMIN — Medication 10 ML: at 09:45

## 2017-02-10 RX ADMIN — Medication 10 ML: at 08:25

## 2017-02-10 RX ADMIN — REGADENOSON 0.4 MG: 0.08 INJECTION, SOLUTION INTRAVENOUS at 09:45

## 2017-02-10 NOTE — PROCEDURES
[address] [facility] CARDIAC STRESS    Name:  Roque Noble  MR#:  [id number]  :  1935  Account #:  [de-identified]  Date of Adm:  02/10/2017  Date of Service:  02/10/2017      ORDERING PHYSICIAN: Dr. Kartik Alston. INDICATIONS: Chronic coronary artery disease, history of bypass  surgery and prior stenting. Resting heart rate 64, blood pressure 126/60. Baseline EKG showed  sinus rhythm with a right bundle branch block and nonspecific ST-T  abnormalities. PHARMACOLOGICAL STRESS PORTION: The patient underwent  Lexiscan infusion at 0.4mg intravenously per protocol via the left arm IV  and then swung his legs. The patient remained in sinus rhythm with  bundle branch block and nonspecific ST-T changes, making this a  nondiagnostic EKG portion of the stress test.    PERFUSION IMAGING: The patient received intravenous technetium  99m sestamibi 11.0 mCi intravenously per protocol via the left arm IV  for resting images and 33.0 mCi an hour and 20 minutes later via the  same IV for stress images. Tomographic views of the left ventricle  obtained and compared. Cavity size was normal during both stress and  rest imaging. There is no transient ischemic dilatation present. There  were multiple reversible perfusion imaging abnormalities present. There was a moderate, reversible perfusion imaging abnormality, small  to medium in size, involving the apical lateral wall of the left ventricle. There was also a medium size, partially reversible perfusion defect  involving the mid distal portion of the anterolateral wall, and finally,  there was a small-sized reversible perfusion defect involving the mid  and distal inferior wall, all concerning for ischemia in a multivessel  distribution. On gated analysis, left ventricular end-diastolic volume  was calculated to be normal. Gating appeared to be suboptimal due to  artifact and poor delineation of the myocardial border.  Ejection fraction  was calculated at 40% with global hypokinesis; however, this was felt  to be inaccurate. CONCLUSIONS  1. Abnormal and high risk pharmacological nuclear stress test.  2. Multiple reversible perfusion imaging abnormalities as described  above involving the apical lateral wall, mid distal anterior wall, and mid  distal inferior wall. 3. Normal left ventricular size, depressed left ventricular systolic  function, with gating artifact so likely inaccurate. Ejection fraction calculated at 40%. 4. Compared to previous study date 06/15/2012, the anterior wall  perfusion imaging abnormality appears unchanged. The inferior  perfusion defect has improved and the apical lateral perfusion defect  appears new. Ejection fraction has decreased from 57-40%.         MD FAUSTINA Gonzalez / ANABELLA  D:  02/10/2017   16:27  T:  02/10/2017   18:10  Job #:  [id number]

## 2017-02-13 LAB
ATTENDING PHYSICIAN, CST07: NORMAL
DIAGNOSIS, 93000: NORMAL
DUKE TM SCORE RESULT, CST14: NORMAL
DUKE TREADMILL SCORE, CST13: NORMAL
ECG INTERP BEFORE EX, CST11: NORMAL
ECG INTERP DURING EX, CST12: NORMAL
FUNCTIONAL CAPACITY, CST17: NORMAL
KNOWN CARDIAC CONDITION, CST08: NORMAL
MAX. DIASTOLIC BP, CST04: 70 MMHG
MAX. HEART RATE, CST05: 84 BPM
MAX. SYSTOLIC BP, CST03: 140 MMHG
OVERALL BP RESPONSE TO EXERCISE, CST16: NORMAL
OVERALL HR RESPONSE TO EXERCISE, CST15: NORMAL
PEAK EX METS, CST10: 1 METS
PROTOCOL NAME, CST01: NORMAL
TEST INDICATION, CST09: NORMAL

## 2017-02-20 NOTE — PROGRESS NOTES
Per your last note \" This gentleman appears to be doing about as well as we could expect and I really have no recommendations for change at this time. He is not having any symptoms to suggest the development of symptomatic obstructive coronary artery disease or heart failure, but it has been 4-1/2 years since his last stenting procedure and I think we should go ahead and do a screening nuclear myocardial perfusion study to be sure that he is not developing any silent ischemia.

## 2017-02-23 ENCOUNTER — TELEPHONE (OUTPATIENT)
Dept: CARDIOLOGY CLINIC | Age: 82
End: 2017-02-23

## 2017-02-24 NOTE — TELEPHONE ENCOUNTER
----- Message from Jose Elliott RN sent at 2/20/2017 12:10 PM EST -----  Per your last note \" This gentleman appears to be doing about as well as we could expect and I really have no recommendations for change at this time. He is not having any symptoms to suggest the development of symptomatic obstructive coronary artery disease or heart failure, but it has been 4-1/2 years since his last stenting procedure and I think we should go ahead and do a screening nuclear myocardial perfusion study to be sure that he is not developing any silent ischemia.

## 2017-02-24 NOTE — TELEPHONE ENCOUNTER
----- Message from Marta Kwan RN sent at 2/20/2017 12:10 PM EST -----  Per your last note \" This gentleman appears to be doing about as well as we could expect and I really have no recommendations for change at this time. He is not having any symptoms to suggest the development of symptomatic obstructive coronary artery disease or heart failure, but it has been 4-1/2 years since his last stenting procedure and I think we should go ahead and do a screening nuclear myocardial perfusion study to be sure that he is not developing any silent ischemia.

## 2017-02-24 NOTE — TELEPHONE ENCOUNTER
I called and discussed the results of the study with the patient. This gentleman's nuclear myocardial perfusion study is abnormal as we might have expected. However, I am going to need to actually review the study myself and review his cardiac catheterization from 2012 to decide whether or not we need to proceed with cardiac catheterization since the patient is essentially asymptomatic and this was a screening test.  Please have them pulled his study up so I can look at it tomorrow. I also need to call him back next week when I am on vacation and make a decision on whether or not we need to proceed with cardiac catheterization.   ES

## 2017-02-27 RX ORDER — INDAPAMIDE 1.25 MG/1
TABLET, FILM COATED ORAL
Qty: 30 TAB | Refills: 2 | Status: SHIPPED | OUTPATIENT
Start: 2017-02-27 | End: 2017-06-02 | Stop reason: SDUPTHER

## 2017-03-07 NOTE — TELEPHONE ENCOUNTER
I called and discussed the patient's nuclear myocardial perfusion study again with him. After reviewing his cath findings from 2012 and the nuclear scan which Dr. Jamaica Ashford reviewed as well I think it is best that we consider proceeding with a cardiac catheterization and probable stenting procedure in the SVG to circumflex and diagonal judging from the nuclear myocardial perfusion study findings. Dr. Jamaica Ashford is in agreement with this plan. Please get this gentleman scheduled for a cardiac catheterization with Dr. Jamaica Ashford either later this week or next week.  ES

## 2017-03-08 ENCOUNTER — TELEPHONE (OUTPATIENT)
Dept: CARDIOLOGY CLINIC | Age: 82
End: 2017-03-08

## 2017-03-08 NOTE — TELEPHONE ENCOUNTER
Dr. Willis Tarango - Can you please addend your note from 2/3 for H&P. Patient is scheduled Cath on 3/15 @ 8:00am with Dr. Swathi Garner.     ----- Message -----      From: Velma Corado RN      Sent: 3/7/2017   5:13 PM        To: Pamela Schwartz     I called and discussed the patient's nuclear myocardial perfusion study again with him.  After reviewing his cath findings from 2012 and the nuclear scan which Dr. Swathi Garner reviewed as well I think it is best that we consider proceeding with a cardiac catheterization and probable stenting procedure in the SVG to circumflex and diagonal judging from the nuclear myocardial perfusion study findings.  Dr. Swathi Garner is in agreement with this plan.  Please get this gentleman scheduled for a cardiac catheterization with Dr. Swathi Garner either later this week or next week. ES         ----- Message -----   Kyle From: Rashaad Pacheco DO      Sent: 3/7/2017   2:55 PM        To:  Velma Corado RN

## 2017-03-13 ENCOUNTER — OFFICE VISIT (OUTPATIENT)
Dept: CARDIOLOGY CLINIC | Age: 82
End: 2017-03-13

## 2017-03-13 VITALS
BODY MASS INDEX: 28.7 KG/M2 | WEIGHT: 205 LBS | OXYGEN SATURATION: 98 % | HEART RATE: 51 BPM | HEIGHT: 71 IN | SYSTOLIC BLOOD PRESSURE: 130 MMHG | DIASTOLIC BLOOD PRESSURE: 68 MMHG

## 2017-03-13 DIAGNOSIS — G47.33 OSA (OBSTRUCTIVE SLEEP APNEA): ICD-10-CM

## 2017-03-13 DIAGNOSIS — M48.10 DISH (DIFFUSE IDIOPATHIC SKELETAL HYPEROSTOSIS): Chronic | ICD-10-CM

## 2017-03-13 DIAGNOSIS — R94.30 ABNORMAL PHARMACOLOGIC MYOCARDIAL PERFUSION STUDY: ICD-10-CM

## 2017-03-13 DIAGNOSIS — I25.10 ATHEROSCLEROSIS OF NATIVE CORONARY ARTERY OF NATIVE HEART WITHOUT ANGINA PECTORIS: Primary | ICD-10-CM

## 2017-03-13 DIAGNOSIS — E78.5 DYSLIPIDEMIA: ICD-10-CM

## 2017-03-13 DIAGNOSIS — I10 ESSENTIAL HYPERTENSION, BENIGN: ICD-10-CM

## 2017-03-13 NOTE — PROGRESS NOTES
HPI: I saw Tamie Mullen, in my office today in cardiovascular evaluation regarding his chronic underlying coronary artery disease prior to a planned repeat cardiac catheterization and stenting procedure due to an abnormal screening nuclear myocardial perfusion study. Mr. Jairo Page is a very pleasant 80year old white male with history of coronary artery disease, status post coronary artery bypass grafting surgery x four in April of 1994, with several angioplasty and stenting procedures since that time.      He had some new onset chest pain in June of 2012 with a markedly abnormal nuclear myocardial perfusion study with subsequent cardiac catheterization demonstrating a totally obstructed left anterior descending, as well as a totally obstructed right coronary artery and totally obstructed saphenous vein graft to the first diagonal and first obtuse marginal branches, together with a 99% stenosis in the proximal segment of the saphenous vein graft to the right posterior descending coronary artery and a patent stent to the mid segment of this vessel with NICOLE 1 flow distally. He subsequently had successful angioplasty and stenting of a saphenous vein graft to the right posterior descending coronary artery with a 3.5 x 22 mm Xience drug eluting stent with 0% residual and excellent distal flow. When I saw him in the office on February 3, 2017 and routine reevaluation he seemed to be having no cardiovascular symptoms but it had been 4-1/2 years since his last stenting procedure and I decided to do a screening nuclear myocardial perfusion study. That study was completed on February 10, 2017 and was read as an abnormal and high risk pharmacologic nuclear stress test.  There were multiple reversible perfusion imaging abnormalities involving the apical lateral wall, mid distal anterior wall, and mid distal inferior wall.   There was also some decreased ejection fraction at 40% although this was felt to be possibly inaccurate due to gating artifact. However, compared to the study of Marilou 15, 2012 anterior wall perfusion imaging abnormality appeared unchanged with the inferior perfusion defect improved with apical lateral perfusion defect appeared to be new and the ejection fraction had decreased from 57% to 40%. In view of the LV dysfunction and ischemia noted it was felt after reviewing the study that it would be best to proceed with a cardiac catheterization and possible stenting procedure if warranted. He comes into the office today and is really having no cardiovascular complaints at this time on his current medical regimen. Encounter Diagnoses   Name Primary?  Atherosclerosis of native coronary artery of native heart without angina pectoris Yes    Abnormal pharmacologic myocardial perfusion study     DISH (diffuse idiopathic skeletal hyperostosis)     Dyslipidemia     Essential hypertension, benign     DIEGO (obstructive sleep apnea)        Discussion: This patient is not having any clear-cut symptomatology, but he has a very abnormal and high risk nuclear myocardial perfusion study and some baseline left ventricular dysfunction. In view of his known past catheterization findings and the multivessel ischemia suggested on these nuclear myocardial perfusion study I think that despite the fact that he is not having any overt symptomatology we should go ahead and do a cardiac catheterization to clarify his anatomy and make appropriate recommendations for long-term management. I have had his nuclear myocardial perfusion study reviewed by Dr. Tyree Tena who is in agreement with proceeding with a cardiac catheterization and possible repeat angioplasty and stenting procedure which he will proceed with on March 15, 2017. PCP: Austin Fontenot MD       Past Medical History:   Diagnosis Date    3-vessel coronary artery disease 02/13/2004    Tech limited. Low normal to mildly depressed LV systolic function. LVH. DDfx.  LAE. PASP 25-30. 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 mLAD 100%. CX diffuse nonobstructive. OM1 patent stent. pRCA 100%. Dbl seq SVG to D1, OM1 100%. SVG to pRPDA 99% (3.5 x 23-mm Xience stent). Patent mid segment stent. LIMA to LAD ok.  Cardiac echocardiogram 11/16/2014    [facility]:  EF 55-60%. DDfx. Normal RVSP. Mild TR. Neg bubble study for atrial shunt. No significant valvular pathology.  Cardiac nuclear imaging test, high risk 06/15/2012    Lg area of mod mid to distal anterior ischemia. Lg area of inferior ischemia. EF 57%. Mild mid-distal anterior, inferior hypk.   High risk pharm stress test.    Cervical spine pain     Deviated septum     s/p corrective surgery 7/1/2010    Disc disease, degenerative, cervical     joint pain, back pain    Dysphagia, unspecified(787.20)     Essential hypertension, benign     Gout with other specified manifestations(274.89)     Head injury     frequent headaches    Hypercholesterolemia     Insomnia, unspecified     Lumbar canal stenosis     Meniere's disease     Myocardial infarction, anterior wall (HCC)     DIEGO (obstructive sleep apnea)     on CPAP    S/P diskectomy 7/5/07    cervical    Spinal stenosis, lumbar region, without neurogenic claudication     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     Unspecified sinusitis (chronic)     Ventral hernia, unspecified, without mention of obstruction or gangrene     S/p repair 6/2003         Past Surgical History:   Procedure Laterality Date    CARDIAC SURG PROCEDURE UNLIST  1/04    angioplasty of a proximal left anterior descending    CARDIAC SURG PROCEDURE UNLIST  6/2/08    angioplasty and stenting of totally obstructed saphenous vein graft to the posterior descending artery    HX APPENDECTOMY      HX CATARACT REMOVAL      bilateral    HX CERVICAL DISKECTOMY  7/07    and fusion    HX CORONARY ARTERY BYPASS GRAFT  4/14/94    X4, with LIMA to the LAD, double sequential to the diagonal and circumflex and single to the main right coronary artery    HX CORONARY STENT PLACEMENT  6/20/2012    HX GI  1992    two feet of colon removed    HX HEART CATHETERIZATION  6/02/08    HX HEART CATHETERIZATION  6/20/2012    HX HEENT  7/1/10    deviated nasal septum surgery    HX HERNIA REPAIR  7/2003    two prior hernia repairs in the past    HX OTHER SURGICAL      HX PTCA  6/20/2012    HX TONSILLECTOMY      as a child    [place] [address] [facility]      for crushed disk         Current Outpatient Rx   Name  Route  Sig  Dispense  Refill    NITROSTAT 0.4 mg SL tablet        DISSOLVE 1 TABLET UNDER THE TONGUE EVERY 5 MINUTES AS NEEDED    25 Tab    0      indapamide (LOZOL) 1.25 mg tablet        TAKE 1 TABLET BY MOUTH DAILY    30 Tab    2      oxyCODONE-acetaminophen (PERCOCET)  mg per tablet    Oral    Take 1 Tab by mouth four (4) times daily as needed for Pain. Max Daily Amount: 4 Tabs. 120 Tab    0      simvastatin (ZOCOR) 40 mg tablet        TAKE 1 TABLET BY MOUTH ONCE DAILY    30 Tab    6      ramipril (ALTACE) 10 mg capsule        TAKE 1 CAPSULE BY MOUTH ONCE DAILY    30 Cap    11      allopurinol (ZYLOPRIM) 300 mg tablet        TAKE 1 TABLET BY MOUTH ONCE DAILY    90 Tab    3      metoprolol (LOPRESSOR) 50 mg tablet    Oral    Take 1 Tab by mouth two (2) times a day. 60 Tab    6      niacin (NIASPAN) 1,000 mg Tb24 tab    Oral    Take 1 tablet by mouth nightly. 30 tablet    11      MULTIVITAMINS W-MINERALS/LUT (CENTRUM SILVER PO)    Oral    Take 1 Tab by mouth daily.  aspirin delayed-release 81 mg tablet    Oral    Take 81 mg by mouth daily.                    No Known Allergies    Social   Social History   Substance Use Topics    Smoking status: Never Smoker    Smokeless tobacco: Never Used    Alcohol use Yes     Family history: family history includes Heart Disease in his father; Stroke in his father. Review of Systems:   Constitutional: Negative. HEENT: Bilateral deafness using hearing aides  Respiratory: Negative. Cardiovascular: Negative. Genitourinary: Positive for 2-3+ nocturia   Gastrointestinal: Negative. Musculoskeletal: Positive for myalgias, back pain, joint pain and neck pain. Negative for falls. Physical Exam:   The patient is an alert, oriented, well developed, well nourished 80 y.o.  male who was in no acute distress at the time of my examination. Visit Vitals    /68 (BP 1 Location: Left arm, BP Patient Position: Sitting)    Pulse (!) 51    Ht 5' 11\" (1.803 m)    Wt 93 kg (205 lb)    SpO2 98%    BMI 28.59 kg/m2      BP Readings from Last 3 Encounters:   03/13/17 130/68   02/03/17 110/60   01/04/17 116/55        Wt Readings from Last 3 Encounters:   03/13/17 93 kg (205 lb)   02/03/17 91.6 kg (202 lb)   01/04/17 89 kg (196 lb 3.2 oz)     HEENT:  Conjuctiva white, mucosa moist, no pallor or cyanosis. Neck: Rather thick neck that is difficult to evaluate for jugular venous distention. No carotid bruits. Cardiovascular: Symmetrical with good excursion. There is a mid-sternotomy scar from distant past surgery. [place] appears to be displaced between the midclavicular line and anterior axillary line in the 5th left intercostal space. Normal S1 and S2, without appreciable murmurs, rubs, clicks or gallops auscultated. Lungs: Few rales in the right base, otherwise the lungs are clear to auscultation. Abdomen: Protuberant with a midline scar from ventral hernia surgery. Soft, but with mild epigastric tenderness. No masses or organomegaly. Extremities: No edema, with 1-2 + peripheral pulses. Review of Data: Please refer to past medical history for most recent cardiac testing.     Lab Results Component Value Date/Time    Cholesterol, total 135 06/24/2016 03:19 PM    HDL Cholesterol 41 06/24/2016 03:19 PM    LDL, calculated 63.4 06/24/2016 03:19 PM    VLDL, calculated 30.6 06/24/2016 03:19 PM    Triglyceride 153 06/24/2016 03:19 PM    CHOL/HDL Ratio 3.3 06/24/2016 03:19 PM       Results for orders placed or performed in visit on 02/03/17   AMB POC EKG ROUTINE W/ 12 LEADS, INTER & REP     Status: None    Narrative    Sinus bradycardia, rate 52. Some very minor nonspecific ST changes, but this EKG is otherwise within normal limits and similar to the EKG of June 13, 2016. Chilango Ovalle D.O., F.A.C.C. [facility]  [facility]  [address] [place] [address]. [address]    PLEASE NOTE:  This document has been produced using voice recognition software. Unrecognized errors in transcription may be present.

## 2017-03-13 NOTE — MR AVS SNAPSHOT
Visit Information Date & Time Provider Department Dept. Phone Encounter #  
 3/13/2017  4:00 PM Ana Julio DO [facility] [address] [id number] Your Appointments 3/30/2017  1:00 PM  
Office Visit with Toni Denver, MD  
[facility] INTER[facility] MEDICINE OF [facility] Appt Note: 5 mos; **Matt.Buster** & 5 mos [address] [facility], [address] [place] [address]  
[phone]  
  
   
 [address] [facility], [address] [facility] [address]  
  
    
 4/26/2017  1:45 PM  
Follow Up with Carol Ann Milligan MD  
[facility]) Appt Note: 3mo fu; lm to call to resh provider out; lm to call to resh provider out; 3 MO FU /  Mer Jernigan provider out [address] [facility] [id number] [phone]  
  
   
 [address] [facility] [address] [facility] [address] Upcoming Health Maintenance Date Due DTaP/Tdap/Td series (1 - Tdap) 12/13/1956 MEDICARE YEARLY EXAM 12/13/2002 Pneumococcal 65+ Low/Medium Risk (2 of 2 - PCV13) 9/7/2017 GLAUCOMA SCREENING Q2Y 4/19/2018 Allergies as of 3/13/2017  Review Complete On: 2/3/2017 By: Ana Julio DO No Known Allergies Current Immunizations  Reviewed on 9/7/2016 Name Date Influenza Vaccine (Quad) PF 9/7/2016, 9/14/2015 Pneumococcal Polysaccharide (PPSV-23) 9/7/2016 Zoster Vaccine, Live 11/28/2016 Not reviewed this visit You Were Diagnosed With   
  
 Codes Comments Atherosclerosis of native coronary artery of native heart without angina pectoris    -  Primary ICD-10-CM: I25.10 ICD-9-CM: 414.01 Vitals Smoking Status Never Smoker Preferred Pharmacy Pharmacy Name Phone [address] [phone] Your Updated Medication List  
  
   
 This list is accurate as of: 3/13/17  4:09 PM.  Always use your most recent med list.  
  
  
  
  
 allopurinol 300 mg tablet Commonly known as:  ZYLOPRIM  
TAKE 1 TABLET BY MOUTH ONCE DAILY  
  
 aspirin delayed-release 81 mg tablet Take 162 mg by mouth daily. CENTRUM SILVER PO Take 1 Tab by mouth daily. fluticasone 50 mcg/actuation nasal spray Commonly known as:  FLONASE  
1 spray each nostril daily  
  
 indapamide 1.25 mg tablet Commonly known as:  LOZOL  
TAKE 1 TABLET BY MOUTH ONCE DAILY  
  
 metoprolol tartrate 50 mg tablet Commonly known as:  LOPRESSOR  
TAKE 1 TABLET BY MOUTH 2 TIMES A DAY  
  
 NAMENDA XR 28 mg capsule Generic drug:  memantine ER  
  
 niacin 1,000 mg Tb24 tab Commonly known as:  Lajune Drape Take 1 tablet by mouth nightly. NITROSTAT 0.4 mg SL tablet Generic drug:  nitroglycerin DISSOLVE 1 TABLET UNDER THE TONGUE EVERY 5 MINUTES AS NEEDED  
  
 * oxyCODONE ER 20 mg ER tablet Commonly known as:  OxyCONTIN  
1 tab PO QAM  
  
 * oxyCODONE ER 20 mg ER tablet Commonly known as:  OxyCONTIN Take 1 po every 12 hours for chronic pain * oxyCODONE ER 20 mg ER tablet Commonly known as:  OxyCONTIN Take 1 po every 12 hour for chronic pain * oxyCODONE ER 20 mg ER tablet Commonly known as:  OxyCONTIN Take 1 po every 12 hours for chronic pain * oxyCODONE-acetaminophen  mg per tablet Commonly known as:  PERCOCET 10  
1 tab PO QID PRN breakthrough pain * oxyCODONE-acetaminophen  mg per tablet Commonly known as:  PERCOCET Take 1 Tab by mouth three (3) times daily as needed for Pain. Max Daily Amount: 3 Tabs. * oxyCODONE-acetaminophen  mg per tablet Commonly known as:  PERCOCET Take 1 Tab by mouth three (3) times daily as needed for Pain. Max Daily Amount: 3 Tabs. * oxyCODONE-acetaminophen  mg per tablet Commonly known as:  PERCOCET  
 Take 1 Tab by mouth three (3) times daily as needed for Pain. Max Daily Amount: 3 Tabs. potassium chloride 10 mEq tablet Commonly known as:  K-DUR, KLOR-CON  
TAKE 1 TABLET BY MOUTH 2 TIMES A DAY  
  
 simvastatin 40 mg tablet Commonly known as:  ZOCOR  
TAKE 1 TABLET BY MOUTH ONCE DAILY * Notice: This list has 8 medication(s) that are the same as other medications prescribed for you. Read the directions carefully, and ask your doctor or other care provider to review them with you. To-Do List   
 03/13/2017 Lab:  CBC WITH AUTOMATED DIFF   
  
 03/13/2017 Lab:  METABOLIC PANEL, COMPREHENSIVE   
  
 03/13/2017 Lab:  PROTHROMBIN TIME + INR   
  
 03/13/2017 Imaging:  XR CHEST PA LAT   
  
 03/15/2017 8:00 AM  
  Appointment with [facility] CAD NO ANESTHESIA; [facility] 1 CATH LAB; [facility] CATH HOLDING at [address] [place] [address] ([phone]) Patient Instructions Instructions Patients Name:  [address] are scheduled to have a Cardiac Cath  on March 15, 2017  at 0800 am.   Please check in at 0700 am  . 2. Please go to [facility] and park in the outpatient parking lot that is located around to the back of the hospital and enter through the [facility] building. Once you enter through the [facility] check in with the  there. The  will either give you directions or assist you in getting to the cath holding area. 3. You are not to eat anything after midnight the morning of the procedure. Please continue to drink fluids up until 0630 am.  (water, juice, black coffee, soft drinks). Do not drink milk or milk products or anything with cream. 
 
4. If you are diabetic, do not take your insulin/sugar pill the morning of the procedure. 5. MEDICATION INSTRUCTIONS:   Please take your morning medications with the following special instructions: Please make sure to take your Blood pressure medication : Continue current medications. Take your Aspirin . 6. We encourage families to wait in the waiting room on the first floor while the procedure is being done. The Doctor will come out and talk with you as soon as the procedure is over. 7. There is the possibility that you may spend the night in the hospital, depending on the results of the procedure. This will be determined after the procedure is done. If angioplasty or stent is planned, you will stay at least one day. 8. If you or your family have any questions, please call our office Monday Friday, 9:00 a. m.4:30 p.m.,  At [phone], and ask to speak to one of the nurses. Introducing [facility]! Renetta Wilson introduces [employer] patient portal. Now you can access parts of your medical record, email your doctor's office, and request medication refills online. 1. In your internet browser, go to [email or web address] 2. Click on the First Time User? Click Here link in the Sign In box. You will see the New Member Sign Up page. 3. Enter your [employer] Access Code exactly as it appears below. You will not need to use this code after youve completed the sign-up process. If you do not sign up before the expiration date, you must request a new code. · [employer] Access Code: [email or web address] Expires: 4/4/2017  4:10 PM 
 
4. Enter the last four digits of your Social Security Number (xxxx) and Date of Birth (mm/dd/yyyy) as indicated and click Submit. You will be taken to the next sign-up page. 5. Create a [employer] ID. This will be your [employer] login ID and cannot be changed, so think of one that is secure and easy to remember. 6. Create a [employer] password. You can change your password at any time. 7. Enter your Password Reset Question and Answer. This can be used at a later time if you forget your password. 8. Enter your e-mail address. You will receive e-mail notification when new information is available in [address] 9. Click Sign Up. You can now view and download portions of your medical record. 10. Click the Download Summary menu link to download a portable copy of your medical information. If you have questions, please visit the Frequently Asked Questions section of the [employer] website. Remember, [employer] is NOT to be used for urgent needs. For medical emergencies, dial 911. Now available from your iPhone and Android! Please provide this summary of care documentation to your next provider. Your primary care clinician is listed as Georgia Lui. If you have any questions after today's visit, please call [phone].

## 2017-03-13 NOTE — PATIENT INSTRUCTIONS
Instructions    Patients Name:  Ciaran Martinez are scheduled to have a Cardiac Cath  on March 15, 2017  at 0800 am.   Please check in at 0700 am  .     2. Please go to [facility] and park in the outpatient parking lot that is located around to the back of the hospital and enter through the [employer]. Once you enter through the [facility] check in with the  there. The  will either give you directions or assist you in getting to the cath holding area. 3. You are not to eat anything after midnight the morning of the procedure. Please continue to drink fluids up until 0630 am.  (water, juice, black coffee, soft drinks). Do not drink milk or milk products or anything with cream.    4. If you are diabetic, do not take your insulin/sugar pill the morning of the procedure. 5. MEDICATION INSTRUCTIONS:   Please take your morning medications with the following special instructions:    [x]          Please make sure to take your Blood pressure medication : Continue current medications. [x]          Take your Aspirin . 6. We encourage families to wait in the waiting room on the first floor while the procedure is being done. The Doctor will come out and talk with you as soon as the procedure is over. 7. There is the possibility that you may spend the night in the hospital, depending on the results of the procedure. This will be determined after the procedure is done. If angioplasty or stent is planned, you will stay at least one day. 8. If you or your family have any questions, please call our office Monday -Friday, 9:00 a.m.-4:30 p.m.,  At [phone], and ask to speak to one of the nurses.

## 2017-03-14 ENCOUNTER — HOSPITAL ENCOUNTER (OUTPATIENT)
Dept: LAB | Age: 82
Discharge: HOME | End: 2017-03-14
Payer: MEDICARE

## 2017-03-14 ENCOUNTER — HOSPITAL ENCOUNTER (OUTPATIENT)
Dept: GENERAL RADIOLOGY | Age: 82
Discharge: HOME | End: 2017-03-14
Payer: MEDICARE

## 2017-03-14 DIAGNOSIS — I25.10 ATHEROSCLEROSIS OF NATIVE CORONARY ARTERY OF NATIVE HEART WITHOUT ANGINA PECTORIS: ICD-10-CM

## 2017-03-14 LAB
ALBUMIN SERPL BCP-MCNC: 3.7 G/DL (ref 3.4–5)
ALBUMIN/GLOB SERPL: 1.4 {RATIO} (ref 0.8–1.7)
ALP SERPL-CCNC: 61 U/L (ref 45–117)
ALT SERPL-CCNC: 18 U/L (ref 16–61)
ANION GAP BLD CALC-SCNC: 7 MMOL/L (ref 3–18)
AST SERPL W P-5'-P-CCNC: 14 U/L (ref 15–37)
BASOPHILS # BLD AUTO: 0 K/UL (ref 0–0.06)
BASOPHILS # BLD: 1 % (ref 0–2)
BILIRUB SERPL-MCNC: 0.6 MG/DL (ref 0.2–1)
BUN SERPL-MCNC: 22 MG/DL (ref 7–18)
BUN/CREAT SERPL: 25 (ref 12–20)
CALCIUM SERPL-MCNC: 8.8 MG/DL (ref 8.5–10.1)
CHLORIDE SERPL-SCNC: 102 MMOL/L (ref 100–108)
CO2 SERPL-SCNC: 34 MMOL/L (ref 21–32)
CREAT SERPL-MCNC: 0.89 MG/DL (ref 0.6–1.3)
DIFFERENTIAL METHOD BLD: ABNORMAL
EOSINOPHIL # BLD: 0.4 K/UL (ref 0–0.4)
EOSINOPHIL NFR BLD: 6 % (ref 0–5)
ERYTHROCYTE [DISTWIDTH] IN BLOOD BY AUTOMATED COUNT: 14.1 % (ref 11.6–14.5)
GLOBULIN SER CALC-MCNC: 2.6 G/DL (ref 2–4)
GLUCOSE SERPL-MCNC: 124 MG/DL (ref 74–99)
HCT VFR BLD AUTO: 41.7 % (ref 36–48)
HGB BLD-MCNC: 13.4 G/DL (ref 13–16)
INR PPP: 0.9 (ref 0.8–1.2)
LYMPHOCYTES # BLD AUTO: 38 % (ref 21–52)
LYMPHOCYTES # BLD: 2.3 K/UL (ref 0.9–3.6)
MCH RBC QN AUTO: 29.9 PG (ref 24–34)
MCHC RBC AUTO-ENTMCNC: 32.1 G/DL (ref 31–37)
MCV RBC AUTO: 93.1 FL (ref 74–97)
MONOCYTES # BLD: 0.5 K/UL (ref 0.05–1.2)
MONOCYTES NFR BLD AUTO: 8 % (ref 3–10)
NEUTS SEG # BLD: 2.9 K/UL (ref 1.8–8)
NEUTS SEG NFR BLD AUTO: 47 % (ref 40–73)
PLATELET # BLD AUTO: 162 K/UL (ref 135–420)
PMV BLD AUTO: 11.6 FL (ref 9.2–11.8)
POTASSIUM SERPL-SCNC: 3.7 MMOL/L (ref 3.5–5.5)
PROT SERPL-MCNC: 6.3 G/DL (ref 6.4–8.2)
PROTHROMBIN TIME: 12.3 SEC (ref 11.5–15.2)
RBC # BLD AUTO: 4.48 M/UL (ref 4.7–5.5)
SODIUM SERPL-SCNC: 143 MMOL/L (ref 136–145)
WBC # BLD AUTO: 6.1 K/UL (ref 4.6–13.2)

## 2017-03-14 PROCEDURE — 85025 COMPLETE CBC W/AUTO DIFF WBC: CPT | Performed by: INTERNAL MEDICINE

## 2017-03-14 PROCEDURE — 36415 COLL VENOUS BLD VENIPUNCTURE: CPT | Performed by: INTERNAL MEDICINE

## 2017-03-14 PROCEDURE — 80053 COMPREHEN METABOLIC PANEL: CPT | Performed by: INTERNAL MEDICINE

## 2017-03-14 PROCEDURE — 85610 PROTHROMBIN TIME: CPT | Performed by: INTERNAL MEDICINE

## 2017-03-14 PROCEDURE — 71020 XR CHEST PA LAT: CPT

## 2017-03-15 ENCOUNTER — HOSPITAL ENCOUNTER (OUTPATIENT)
Dept: CARDIAC CATH/INVASIVE PROCEDURES | Age: 82
Discharge: HOME | End: 2017-03-15
Attending: INTERNAL MEDICINE | Admitting: INTERNAL MEDICINE
Payer: MEDICARE

## 2017-03-15 VITALS
TEMPERATURE: 97.8 F | OXYGEN SATURATION: 98 % | HEART RATE: 48 BPM | HEIGHT: 71 IN | DIASTOLIC BLOOD PRESSURE: 44 MMHG | WEIGHT: 192 LBS | BODY MASS INDEX: 26.88 KG/M2 | RESPIRATION RATE: 8 BRPM | SYSTOLIC BLOOD PRESSURE: 104 MMHG

## 2017-03-15 PROCEDURE — 99152 MOD SED SAME PHYS/QHP 5/>YRS: CPT

## 2017-03-15 PROCEDURE — 74011636320 HC RX REV CODE- 636/320: Performed by: INTERNAL MEDICINE

## 2017-03-15 PROCEDURE — C1760 CLOSURE DEV, VASC: HCPCS

## 2017-03-15 PROCEDURE — 99153 MOD SED SAME PHYS/QHP EA: CPT

## 2017-03-15 PROCEDURE — 74011000250 HC RX REV CODE- 250: Performed by: INTERNAL MEDICINE

## 2017-03-15 PROCEDURE — 77030004558 HC CATH ANGI DX SUPR TORQ CARD -A

## 2017-03-15 PROCEDURE — C1894 INTRO/SHEATH, NON-LASER: HCPCS

## 2017-03-15 PROCEDURE — 74011250636 HC RX REV CODE- 250/636: Performed by: INTERNAL MEDICINE

## 2017-03-15 PROCEDURE — 77030013797 HC KT TRNSDUC PRSSR EDWD -A

## 2017-03-15 PROCEDURE — 93455 CORONARY ART/GRFT ANGIO S&I: CPT

## 2017-03-15 RX ORDER — FENTANYL CITRATE 50 UG/ML
12.5-1 INJECTION, SOLUTION INTRAMUSCULAR; INTRAVENOUS
Status: DISCONTINUED | OUTPATIENT
Start: 2017-03-15 | End: 2017-03-15 | Stop reason: HOSPADM

## 2017-03-15 RX ORDER — SODIUM CHLORIDE 0.9 % (FLUSH) 0.9 %
5-10 SYRINGE (ML) INJECTION EVERY 8 HOURS
Status: DISCONTINUED | OUTPATIENT
Start: 2017-03-15 | End: 2017-03-15 | Stop reason: HOSPADM

## 2017-03-15 RX ORDER — SODIUM CHLORIDE 0.9 % (FLUSH) 0.9 %
5-10 SYRINGE (ML) INJECTION AS NEEDED
Status: DISCONTINUED | OUTPATIENT
Start: 2017-03-15 | End: 2017-03-15 | Stop reason: HOSPADM

## 2017-03-15 RX ORDER — FENTANYL CITRATE 50 UG/ML
25 INJECTION, SOLUTION INTRAMUSCULAR; INTRAVENOUS
Status: DISCONTINUED | OUTPATIENT
Start: 2017-03-15 | End: 2017-03-15 | Stop reason: HOSPADM

## 2017-03-15 RX ORDER — BIVALIRUDIN 250 MG/5ML
0.75 INJECTION, POWDER, LYOPHILIZED, FOR SOLUTION INTRAVENOUS ONCE
Status: DISCONTINUED | OUTPATIENT
Start: 2017-03-15 | End: 2017-03-15 | Stop reason: HOSPADM

## 2017-03-15 RX ORDER — HEPARIN SODIUM 200 [USP'U]/100ML
500 INJECTION, SOLUTION INTRAVENOUS ONCE
Status: COMPLETED | OUTPATIENT
Start: 2017-03-15 | End: 2017-03-15

## 2017-03-15 RX ORDER — OXYCODONE HCL 20 MG/1
20 TABLET, FILM COATED, EXTENDED RELEASE ORAL ONCE
Status: COMPLETED | OUTPATIENT
Start: 2017-03-15 | End: 2017-03-15

## 2017-03-15 RX ORDER — MIDAZOLAM HYDROCHLORIDE 1 MG/ML
.5-2 INJECTION, SOLUTION INTRAMUSCULAR; INTRAVENOUS
Status: DISCONTINUED | OUTPATIENT
Start: 2017-03-15 | End: 2017-03-15 | Stop reason: HOSPADM

## 2017-03-15 RX ORDER — HEPARIN SODIUM 1000 [USP'U]/ML
1000-10000 INJECTION, SOLUTION INTRAVENOUS; SUBCUTANEOUS
Status: DISCONTINUED | OUTPATIENT
Start: 2017-03-15 | End: 2017-03-15 | Stop reason: HOSPADM

## 2017-03-15 RX ORDER — LIDOCAINE HYDROCHLORIDE 10 MG/ML
1-30 INJECTION, SOLUTION EPIDURAL; INFILTRATION; INTRACAUDAL; PERINEURAL
Status: DISCONTINUED | OUTPATIENT
Start: 2017-03-15 | End: 2017-03-15 | Stop reason: HOSPADM

## 2017-03-15 RX ADMIN — FENTANYL CITRATE 50 MCG: 50 INJECTION INTRAMUSCULAR; INTRAVENOUS at 08:28

## 2017-03-15 RX ADMIN — IOPAMIDOL 130 ML: 612 INJECTION, SOLUTION INTRAVENOUS at 09:02

## 2017-03-15 RX ADMIN — NITROGLYCERIN 100 MCG: 5 INJECTION, SOLUTION INTRAVENOUS at 08:43

## 2017-03-15 RX ADMIN — HEPARIN SODIUM 1000 UNITS: 200 INJECTION, SOLUTION INTRAVENOUS at 08:34

## 2017-03-15 RX ADMIN — OXYCODONE HCL 20 MG: 20 TABLET, FILM COATED, EXTENDED RELEASE ORAL at 09:19

## 2017-03-15 RX ADMIN — FENTANYL CITRATE 25 MCG: 50 INJECTION INTRAMUSCULAR; INTRAVENOUS at 11:20

## 2017-03-15 RX ADMIN — LIDOCAINE HYDROCHLORIDE 6 ML: 10 INJECTION, SOLUTION EPIDURAL; INFILTRATION; INTRACAUDAL; PERINEURAL at 08:41

## 2017-03-15 RX ADMIN — MIDAZOLAM HYDROCHLORIDE 1 MG: 1 INJECTION, SOLUTION INTRAMUSCULAR; INTRAVENOUS at 08:35

## 2017-03-15 RX ADMIN — FENTANYL CITRATE 25 MCG: 50 INJECTION INTRAMUSCULAR; INTRAVENOUS at 08:48

## 2017-03-15 NOTE — ROUTINE PROCESS
TRANSFER - IN REPORT:    Verbal report received from Kavitha RN(name) on Suze Villafana.  being received from [facility](unit) for ordered procedure      Report consisted of patients Situation, Background, Assessment and   Recommendations(SBAR). Information from the following report(s) SBAR, Intake/Output, MAR and Recent Results was reviewed with the receiving nurse. Opportunity for questions and clarification was provided. Assessment completed upon patients arrival to unit and care assumed.

## 2017-03-15 NOTE — IP AVS SNAPSHOT
[address] Patient: Silvia White. MRN: [id number] :1935 You are allergic to the following No active allergies Recent Documentation Height Weight BMI Smoking Status 1.803 m 87.1 kg 26.78 kg/m2 Never Smoker Emergency Contacts Name Discharge Info Relation Home Work Mobile [facility] DISCHARGE CAREGIVER [3] Spouse [3] [phone] About your hospitalization You were admitted on:  March 15, 2017 You last received care in the:  [facility] CATH HOLDING You were discharged on:  March 15, 2017 Unit phone number:  [phone] Why you were hospitalized Your primary diagnosis was:  Not on File Providers Seen During Your Hospitalizations Provider Role Specialty Primary office phone Yojana Mejia MD Attending Provider Cardiology [phone] Your Primary Care Physician (PCP) Primary Care Physician Office Phone Office Fax [address], [address] [place] [address] [phone] Follow-up Information Follow up With Details Comments Contact Info Yojana Mejia MD Schedule an appointment as soon as possible for a visit in 1 month  [address] [facility] [address] 200 [facility] 
[phone] Your Appointments   1:00 PM EDT Office Visit with Ramses Bocanegra MD  
[facility] [place] [address]) [address] [place] [address], [address] [facility] [address] [phone] 2017  1:45 PM EDT Follow Up with Bea Mora MD  
[facility] [address]) [address] [facility] [address] [phone] Current Discharge Medication List  
  
CONTINUE these medications which have NOT CHANGED Dose & Instructions Dispensing Information Comments Morning Noon Evening Bedtime  
 allopurinol 300 mg tablet Commonly known as:  Dionicia Dakins Your last dose was: Your next dose is: TAKE 1 TABLET BY MOUTH ONCE DAILY Quantity:  90 Tab Refills:  3  
     
   
   
   
  
 aspirin delayed-release 81 mg tablet Your last dose was: Your next dose is:    
   
   
 Dose:  162 mg Take 162 mg by mouth daily. Refills:  0 CENTRUM SILVER PO Your last dose was: Your next dose is:    
   
   
 Dose:  1 Tab Take 1 Tab by mouth daily. Refills:  0  
     
   
   
   
  
 fluticasone 50 mcg/actuation nasal spray Commonly known as:  Bettina Fetch Your last dose was: Your next dose is:    
   
   
 1 spray each nostril daily Quantity:  1 Bottle Refills:  1  
     
   
   
   
  
 indapamide 1.25 mg tablet Commonly known as:  Author Wellington Your last dose was: Your next dose is: TAKE 1 TABLET BY MOUTH ONCE DAILY Quantity:  30 Tab Refills:  2  
     
   
   
   
  
 metoprolol tartrate 50 mg tablet Commonly known as:  LOPRESSOR Your last dose was: Your next dose is: TAKE 1 TABLET BY MOUTH 2 TIMES A DAY Quantity:  60 Tab Refills:  6 NAMENDA XR 28 mg capsule Generic drug:  memantine ER Your last dose was: Your next dose is:    
   
   
  Refills:  1  
     
   
   
   
  
 niacin 1,000 mg Tb24 tab Commonly known as:  Rodríguezyn July Your last dose was: Your next dose is:    
   
   
 Dose:  1000 mg Take 1 tablet by mouth nightly. Quantity:  30 tablet Refills:  11  
     
   
   
   
  
 NITROSTAT 0.4 mg SL tablet Generic drug:  nitroglycerin Your last dose was: Your next dose is:    
   
   
 DISSOLVE 1 TABLET UNDER THE TONGUE EVERY 5 MINUTES AS NEEDED Quantity:  25 Tab Refills:  0 * oxyCODONE ER 20 mg ER tablet Commonly known as:  OxyCONTIN Your last dose was: Your next dose is:    
   
   
 1 tab PO QAM  
 Quantity:  30 Tab Refills:  0  
     
   
   
   
  
 * oxyCODONE ER 20 mg ER tablet Commonly known as:  OxyCONTIN Your last dose was: Your next dose is: Take 1 po every 12 hours for chronic pain Quantity:  60 Tab Refills:  0  
     
   
   
   
  
 * oxyCODONE ER 20 mg ER tablet Commonly known as:  OxyCONTIN Your last dose was: Your next dose is: Take 1 po every 12 hour for chronic pain Quantity:  60 Tab Refills:  0  
     
   
   
   
  
 * oxyCODONE ER 20 mg ER tablet Commonly known as:  OxyCONTIN Your last dose was: Your next dose is: Take 1 po every 12 hours for chronic pain Quantity:  60 Tab Refills:  0  
     
   
   
   
  
 * oxyCODONE-acetaminophen  mg per tablet Commonly known as:  PERCOCET 10 Your last dose was: Your next dose is:    
   
   
 1 tab PO QID PRN breakthrough pain Quantity:  120 Tab Refills:  0  
     
   
   
   
  
 * oxyCODONE-acetaminophen  mg per tablet Commonly known as:  PERCOCET Your last dose was: Your next dose is:    
   
   
 Dose:  1 Tab Take 1 Tab by mouth three (3) times daily as needed for Pain. Max Daily Amount: 3 Tabs. Quantity:  90 Tab Refills:  0  
     
   
   
   
  
 * oxyCODONE-acetaminophen  mg per tablet Commonly known as:  PERCOCET Your last dose was: Your next dose is:    
   
   
 Dose:  1 Tab Take 1 Tab by mouth three (3) times daily as needed for Pain. Max Daily Amount: 3 Tabs. Quantity:  90 Tab Refills:  0  
     
   
   
   
  
 * oxyCODONE-acetaminophen  mg per tablet Commonly known as:  PERCOCET Your last dose was: Your next dose is:    
   
   
 Dose:  1 Tab Take 1 Tab by mouth three (3) times daily as needed for Pain. Max Daily Amount: 3 Tabs. Quantity:  90 Tab Refills:  0  
     
   
   
   
  
 potassium chloride 10 mEq tablet Commonly known as:  K-DUR, KLOR-CON Your last dose was: Your next dose is: TAKE 1 TABLET BY MOUTH 2 TIMES A DAY Quantity:  60 Tab Refills:  1  
     
   
   
   
  
 simvastatin 40 mg tablet Commonly known as:  ZOCOR Your last dose was: Your next dose is: TAKE 1 TABLET BY MOUTH ONCE DAILY Quantity:  30 Tab Refills:  6  
     
   
   
   
  
 * Notice: This list has 8 medication(s) that are the same as other medications prescribed for you. Read the directions carefully, and ask your doctor or other care provider to review them with you. Discharge Instructions DISCHARGE SUMMARY from Nurse The following personal items are in your possession at time of discharge: 
 
  
Visual Aid: None Hearing Aids/Status: At home, Bilateral 
  
  
  
  
  
 
 
 
 
PATIENT INSTRUCTIONS: 
 
After general anesthesia or intravenous sedation, for 24 hours or while taking prescription Narcotics: · Limit your activities · Do not drive and operate hazardous machinery · Do not make important personal or business decisions · Do  not drink alcoholic beverages · If you have not urinated within 8 hours after discharge, please contact your surgeon on call. Report the following to your surgeon: 
· Excessive pain, swelling, redness or odor of or around the surgical area · Temperature over 100.5 · Nausea and vomiting lasting longer than 4 hours or if unable to take medications · Any signs of decreased circulation or nerve impairment to extremity: change in color, persistent  numbness, tingling, coldness or increase pain · Any questions What to do at Home: 
                  
*Check the puncture site frequently for swelling or bleeding.  If you see any bleeding, lie down and apply pressure over the area with a clean town or washcloth. Notify your doctor for any redness, swelling, drainage or oozing from the puncture site. Notify your doctor for any fever or chills. *If the leg or arm with the puncture becomes cold, numb or painful, call Dr Akil Lyeva immediately at  [phone] *Activity should be limited for the next 48 hours. Climb stairs as little as possible and avoid any stooping, bending or strenuous activity for 48 hours. No heavy lifting (anything over 10 pounds) for three days. *Do not drive for 48 hours. *You may resume your usual diet. Drink more fluids than usual. 
 
*Have a responsible person drive you home and stay with you for at least 24 hours after your heart catheterization/angiography. *You may remove the bandage from your Right and Groin in 24 hours. You may shower in 24 hours. No tub baths, hot tubs or swimming for one week. Do not place any lotions, creams, powders, ointments over the puncture site for one week. You may place a clean band-aid over the puncture site each day for 5 days. Change this daily. *  Please give a list of your current medications to your Primary Care Provider. *  Please update this list whenever your medications are discontinued, doses are 
    changed, or new medications (including over-the-counter products) are added. *  Please carry medication information at all times in case of emergency situations. These are general instructions for a healthy lifestyle: No smoking/ No tobacco products/ Avoid exposure to second hand smoke Surgeon General's Warning:  Quitting smoking now greatly reduces serious risk to your health. Obesity, smoking, and sedentary lifestyle greatly increases your risk for illness A healthy diet, regular physical exercise & weight monitoring are important for maintaining a healthy lifestyle You may be retaining fluid if you have a history of heart failure or if you experience any of the following symptoms:  Weight gain of 3 pounds or more overnight or 5 pounds in a week, increased swelling in our hands or feet or shortness of breath while lying flat in bed. Please call your doctor as soon as you notice any of these symptoms; do not wait until your next office visit. Recognize signs and symptoms of STROKE: 
 
F-face looks uneven A-arms unable to move or move unevenly S-speech slurred or non-existent T-time-call 911 as soon as signs and symptoms begin-DO NOT go Back to bed or wait to see if you get better-TIME IS BRAIN. Warning Signs of HEART ATTACK Call 911 if you have these symptoms: 
? Chest discomfort. Most heart attacks involve discomfort in the center of the chest that lasts more than a few minutes, or that goes away and comes back. It can feel like uncomfortable pressure, squeezing, fullness, or pain. ? Discomfort in other areas of the upper body. Symptoms can include pain or discomfort in one or both arms, the back, neck, jaw, or stomach. ? Shortness of breath with or without chest discomfort. ? Other signs may include breaking out in a cold sweat, nausea, or lightheadedness. Don't wait more than five minutes to call [address]! Fast action can save your life. Calling 911 is almost always the fastest way to get lifesaving treatment. Emergency Medical Services staff can begin treatment when they arrive  up to an hour sooner than if someone gets to the hospital by car. The discharge information has been reviewed with the patient and spouse. The patient and spouse verbalized understanding. Discharge medications reviewed with the patient and spouse and appropriate educational materials and side effects teaching were provided. [employer] Activation Thank you for requesting access to [employer].  Please follow the instructions below to securely access and download your online medical record. Codota allows you to send messages to your doctor, view your test results, renew your prescriptions, schedule appointments, and more. How Do I Sign Up? 1. In your internet browser, go to [email or web address]. 2. Click on the First Time User? Click Here link in the Sign In box. You will see the New Member Sign Up page. 3. Enter your Codota Access Code exactly as it appears below. You will not need to use this code after youve completed the sign-up process. If you do not sign up before the expiration date, you must request a new code. Codota Access Code: [email or web address] Expires: 2017  4:10 PM (This is the date your Codota access code will ) 4. Enter the last four digits of your Social Security Number (xxxx) and Date of Birth (mm/dd/yyyy) as indicated and click Submit. You will be taken to the next sign-up page. 5. Create a Codota ID. This will be your Codota login ID and cannot be changed, so think of one that is secure and easy to remember. 6. Create a Codota password. You can change your password at any time. 7. Enter your Password Reset Question and Answer. This can be used at a later time if you forget your password. 8. Enter your e-mail address. You will receive e-mail notification when new information is available in [address] 9. Click Sign Up. You can now view and download portions of your medical record. 10. Click the Download Summary menu link to download a portable copy of your medical information. Additional Information If you have questions, please visit the Frequently Asked Questions section of the Codota website at [email or web address] Remember, [employer]ota is NOT to be used for urgent needs. For medical emergencies, dial 911. Patient armband removed and shredded Discharge Orders None ACO Transitions of Care Introducing [facility] [address] offers a voluntary care coordination program to provide high quality service and care to [facility] fee-for-service beneficiaries. Jose Oropeza was designed to help you enhance your health and well-being through the following services: ? Transitions of Care  support for individuals who are transitioning from one care setting to another (example: Hospital to home). ? Chronic and Complex Care Coordination  support for individuals and caregivers of those with serious or chronic illnesses or with more than one chronic (ongoing) condition and those who take a number of different medications. If you meet specific medical criteria, a [address] [place] [address] Rd may call you directly to coordinate your care with your primary care physician and your other care providers. For questions about the [facility] programs, please, contact your physicians office. For general questions or additional information about Accountable Care Organizations: 
Please visit www.medicare.gov/acos. html or call 1-800-MEDICARE (7-804.709.6186) TTY users should call 3-272.103.6364. Introducing \[facility]\"" & HEALTH SERVICES! [employer] introduces [employer] patient portal. Now you can access parts of your medical record, email your doctor's office, and request medication refills online. 1. In your internet browser, go to [email or web address] 2. Click on the First Time User? Click Here link in the Sign In box. You will see the New Member Sign Up page. 3. Enter your [employer] Access Code exactly as it appears below. You will not need to use this code after youve completed the sign-up process. If you do not sign up before the expiration date, you must request a new code. · [employer] Access Code: [email or web address] Expires: 4/4/2017  4:10 PM 
 
4.  Enter the last four digits of your Social Security Number (xxxx) and Date of Birth (mm/dd/yyyy) as indicated and click Submit. You will be taken to the next sign-up page. 5. Create a Bensata ID. This will be your Bens[employer] login ID and cannot be changed, so think of one that is secure and easy to remember. 6. Create a Bensata password. You can change your password at any time. 7. Enter your Password Reset Question and Answer. This can be used at a later time if you forget your password. 8. Enter your e-mail address. You will receive e-mail notification when new information is available in [address] 9. Click Sign Up. You can now view and download portions of your medical record. 10. Click the Download Summary menu link to download a portable copy of your medical information. If you have questions, please visit the Frequently Asked Questions section of the Bens[employer] website. Remember, [employer] is NOT to be used for urgent needs. For medical emergencies, dial 911. Now available from your iPhone and Android! General Information Please provide this summary of care documentation to your next provider. Patient Signature:  ____________________________________________________________ Date:  ____________________________________________________________  
  
Dameon Cartagenave Provider Signature:  ____________________________________________________________ Date:  ____________________________________________________________

## 2017-03-15 NOTE — PROGRESS NOTES
Cath holding summary    0730:  Patient escorted to cath holding from waiting area ambulatory, alert and oriented x 4, voicing no complaints. Changed into gown and placed on monitor. SB noted. NPO since MN. Lab results, med rec and H&P reviewed on chart. PIV x 2 inserted without difficulty. Patient had aspirin this morning. Family to bedside. 65:  TRANSFER - OUT REPORT:    Verbal report given to Becka Shaikh RN(name) on [facility].  being transferred to cath lab(unit) for ordered procedure       Report consisted of patients Situation, Background, Assessment and   Recommendations(SBAR). Information from the following report(s) SBAR was reviewed with the receiving nurse. Lines:   Peripheral IV 03/15/17 Right Arm (Active)   Site Assessment Clean, dry, & intact 3/15/2017  7:36 AM   Phlebitis Assessment 0 3/15/2017  7:36 AM   Infiltration Assessment 0 3/15/2017  7:36 AM   Dressing Status Clean, dry, & intact 3/15/2017  7:36 AM   Dressing Type Transparent 3/15/2017  7:36 AM   Hub Color/Line Status Flushed 3/15/2017  7:36 AM       Peripheral IV 03/15/17 Left Antecubital (Active)   Site Assessment Clean, dry, & intact 3/15/2017  7:36 AM   Phlebitis Assessment 0 3/15/2017  7:36 AM   Infiltration Assessment 0 3/15/2017  7:36 AM   Dressing Status Clean, dry, & intact 3/15/2017  7:36 AM   Dressing Type Transparent 3/15/2017  7:36 AM   Hub Color/Line Status Flushed 3/15/2017  7:36 AM        Opportunity for questions and clarification was provided. Patient transported with:   Registered Nurse     0886:  TRANSFER - IN REPORT:    Verbal report received from Becka Shaikh RN(name) on [address]  being received from cath lab(unit) for routine post - op      Report consisted of patients Situation, Background, Assessment and   Recommendations(SBAR). Information from the following report(s) Procedure Summary was reviewed with the receiving nurse.     Opportunity for questions and clarification was provided. Assessment completed upon patients arrival to unit and care assumed. 5813:  Patient with continued back pain worsening during procedure from positioning on table. Dr. Arvizu Betters aware and patient took own pain meds as prescribed. Wife at bedside. Right groin soft, dry and nontender. 1010:  Denies complaints. Resting in bed with family at bedside. 1510:  Pt up to ambulate without difficulty. Remains asymptomatic with stable groin site after progressive ambulation. Dressed at bedside and PIV removed in good condition. DC instructions reviewed with verbalized understanding and copy provided. Released ambulatory to wife at vehicle in no pain.

## 2017-03-15 NOTE — ROUTINE PROCESS
TRANSFER - OUT REPORT:    Verbal report given to [facility], RN. (name) on [address]  being transferred to [facility] ([facility]) for routine progression of care       Report consisted of patients Situation, Background, Assessment and   Recommendations(SBAR). Information from the following report(s) SBAR, Procedure Summary and MAR was reviewed with the receiving nurse. Lines:   Peripheral IV 03/15/17 Right Arm (Active)   Site Assessment Clean, dry, & intact 3/15/2017  7:36 AM   Phlebitis Assessment 0 3/15/2017  7:36 AM   Infiltration Assessment 0 3/15/2017  7:36 AM   Dressing Status Clean, dry, & intact 3/15/2017  7:36 AM   Dressing Type Transparent 3/15/2017  7:36 AM   Hub Color/Line Status Flushed 3/15/2017  7:36 AM       Peripheral IV 03/15/17 Left Antecubital (Active)   Site Assessment Clean, dry, & intact 3/15/2017  7:36 AM   Phlebitis Assessment 0 3/15/2017  7:36 AM   Infiltration Assessment 0 3/15/2017  7:36 AM   Dressing Status Clean, dry, & intact 3/15/2017  7:36 AM   Dressing Type Transparent 3/15/2017  7:36 AM   Hub Color/Line Status Flushed 3/15/2017  7:36 AM        Opportunity for questions and clarification was provided.       Patient transported with:   [employer]

## 2017-03-15 NOTE — DISCHARGE INSTRUCTIONS
DISCHARGE SUMMARY from Nurse    The following personal items are in your possession at time of discharge:       Visual Aid: None  Hearing Aids/Status: At home, Bilateral                         PATIENT INSTRUCTIONS:    After general anesthesia or intravenous sedation, for 24 hours or while taking prescription Narcotics:  · Limit your activities  · Do not drive and operate hazardous machinery  · Do not make important personal or business decisions  · Do  not drink alcoholic beverages  · If you have not urinated within 8 hours after discharge, please contact your surgeon on call. Report the following to your surgeon:  · Excessive pain, swelling, redness or odor of or around the surgical area  · Temperature over 100.5  · Nausea and vomiting lasting longer than 4 hours or if unable to take medications  · Any signs of decreased circulation or nerve impairment to extremity: change in color, persistent  numbness, tingling, coldness or increase pain  · Any questions        What to do at Home:                     *Check the puncture site frequently for swelling or bleeding. If you see any bleeding, lie down and apply pressure over the area with a clean town or washcloth. Notify your doctor for any redness, swelling, drainage or oozing from the puncture site. Notify your doctor for any fever or chills. *If the leg or arm with the puncture becomes cold, numb or painful, call Dr Yolanda Evangelista immediately at  [phone]    *Activity should be limited for the next 48 hours. Climb stairs as little as possible and avoid any stooping, bending or strenuous activity for 48 hours. No heavy lifting (anything over 10 pounds) for three days. *Do not drive for 48 hours. *You may resume your usual diet. Drink more fluids than usual.    *Have a responsible person drive you home and stay with you for at least 24 hours after your heart catheterization/angiography. *You may remove the bandage from your Right and Groin in 24 hours.  You may shower in 24 hours. No tub baths, hot tubs or swimming for one week. Do not place any lotions, creams, powders, ointments over the puncture site for one week. You may place a clean band-aid over the puncture site each day for 5 days. Change this daily. *  Please give a list of your current medications to your Primary Care Provider. *  Please update this list whenever your medications are discontinued, doses are      changed, or new medications (including over-the-counter products) are added. *  Please carry medication information at all times in case of emergency situations. These are general instructions for a healthy lifestyle:    No smoking/ No tobacco products/ Avoid exposure to second hand smoke    Surgeon General's Warning:  Quitting smoking now greatly reduces serious risk to your health. Obesity, smoking, and sedentary lifestyle greatly increases your risk for illness    A healthy diet, regular physical exercise & weight monitoring are important for maintaining a healthy lifestyle    You may be retaining fluid if you have a history of heart failure or if you experience any of the following symptoms:  Weight gain of 3 pounds or more overnight or 5 pounds in a week, increased swelling in our hands or feet or shortness of breath while lying flat in bed. Please call your doctor as soon as you notice any of these symptoms; do not wait until your next office visit. Recognize signs and symptoms of STROKE:    F-face looks uneven    A-arms unable to move or move unevenly    S-speech slurred or non-existent    T-time-call 911 as soon as signs and symptoms begin-DO NOT go       Back to bed or wait to see if you get better-TIME IS BRAIN. Warning Signs of HEART ATTACK     Call 911 if you have these symptoms:   Chest discomfort. Most heart attacks involve discomfort in the center of the chest that lasts more than a few minutes, or that goes away and comes back.  It can feel like uncomfortable pressure, squeezing, fullness, or pain.  Discomfort in other areas of the upper body. Symptoms can include pain or discomfort in one or both arms, the back, neck, jaw, or stomach.  Shortness of breath with or without chest discomfort.  Other signs may include breaking out in a cold sweat, nausea, or lightheadedness. Don't wait more than five minutes to call 911 - MINUTES MATTER! Fast action can save your life. Calling 911 is almost always the fastest way to get lifesaving treatment. Emergency Medical Services staff can begin treatment when they arrive -- up to an hour sooner than if someone gets to the hospital by car. The discharge information has been reviewed with the patient and spouse. The patient and spouse verbalized understanding. Discharge medications reviewed with the patient and spouse and appropriate educational materials and side effects teaching were provided. Wyldfire Activation    Thank you for requesting access to [employer]. Please follow the instructions below to securely access and download your online medical record. [employer]ldfire allows you to send messages to your doctor, view your test results, renew your prescriptions, schedule appointments, and more. How Do I Sign Up? 1. In your internet browser, go to [email or web address]. 2. Click on the First Time User? Click Here link in the Sign In box. You will see the New Member Sign Up page. 3. Enter your Wyldfire Access Code exactly as it appears below. You will not need to use this code after youve completed the sign-up process. If you do not sign up before the expiration date, you must request a new code. Wyldfire Access Code: [email or web address]  Expires: 2017  4:10 PM (This is the date your Wyldfire access code will )    4. Enter the last four digits of your Social Security Number (xxxx) and Date of Birth (mm/dd/yyyy) as indicated and click Submit.  You will be taken to the next sign-up page. 5. Create a Cer[employer]ont ID. This will be your [employer]osy login ID and cannot be changed, so think of one that is secure and easy to remember. 6. Create a Gunosy password. You can change your password at any time. 7. Enter your Password Reset Question and Answer. This can be used at a later time if you forget your password. 8. Enter your e-mail address. You will receive e-mail notification when new information is available in [address] 9. Click Sign Up. You can now view and download portions of your medical record. 10. Click the Download Summary menu link to download a portable copy of your medical information. Additional Information    If you have questions, please visit the Frequently Asked Questions section of the [employer]osy website at [email or web address] Remember, [employer]osy is NOT to be used for urgent needs. For medical emergencies, dial 911.       Patient armband removed and shredded

## 2017-03-15 NOTE — H&P
HPI: I saw Ladarius Salazar. Jae Lisa, in my office today in cardiovascular evaluation regarding his chronic underlying coronary artery disease prior to a planned repeat cardiac catheterization and stenting procedure due to an abnormal screening nuclear myocardial perfusion study. Mr. Jack Sandra is a very pleasant 80year old white male with history of coronary artery disease, status post coronary artery bypass grafting surgery x four in April of 1994, with several angioplasty and stenting procedures since that time.       He had some new onset chest pain in June of 2012 with a markedly abnormal nuclear myocardial perfusion study with subsequent cardiac catheterization demonstrating a totally obstructed left anterior descending, as well as a totally obstructed right coronary artery and totally obstructed saphenous vein graft to the first diagonal and first obtuse marginal branches, together with a 99% stenosis in the proximal segment of the saphenous vein graft to the right posterior descending coronary artery and a patent stent to the mid segment of this vessel with NICOLE 1 flow distally. He subsequently had successful angioplasty and stenting of a saphenous vein graft to the right posterior descending coronary artery with a 3.5 x 22 mm Xience drug eluting stent with 0% residual and excellent distal flow.      When I saw him in the office on February 3, 2017 and routine reevaluation he seemed to be having no cardiovascular symptoms but it had been 4-1/2 years since his last stenting procedure and I decided to do a screening nuclear myocardial perfusion study. That study was completed on February 10, 2017 and was read as an abnormal and high risk pharmacologic nuclear stress test. There were multiple reversible perfusion imaging abnormalities involving the apical lateral wall, mid distal anterior wall, and mid distal inferior wall.  There was also some decreased ejection fraction at 40% although this was felt to be possibly inaccurate due to gating artifact. However, compared to the study of Marilou 15, 2012 anterior wall perfusion imaging abnormality appeared unchanged with the inferior perfusion defect improved with apical lateral perfusion defect appeared to be new and the ejection fraction had decreased from 57% to 40%. In view of the LV dysfunction and ischemia noted it was felt after reviewing the study that it would be best to proceed with a cardiac catheterization and possible stenting procedure if warranted.     He comes into the office today and is really having no cardiovascular complaints at this time on his current medical regimen.          Encounter Diagnoses   Name Primary?  Atherosclerosis of native coronary artery of native heart without angina pectoris Yes    Abnormal pharmacologic myocardial perfusion study      DISH (diffuse idiopathic skeletal hyperostosis)      Dyslipidemia      Essential hypertension, benign      DIEGO (obstructive sleep apnea)           Discussion: This patient is not having any clear-cut symptomatology, but he has a very abnormal and high risk nuclear myocardial perfusion study and some baseline left ventricular dysfunction. In view of his known past catheterization findings and the multivessel ischemia suggested on these nuclear myocardial perfusion study I think that despite the fact that he is not having any overt symptomatology we should go ahead and do a cardiac catheterization to clarify his anatomy and make appropriate recommendations for long-term management.     I have had his nuclear myocardial perfusion study reviewed by Dr. Mansoor Najera who is in agreement with proceeding with a cardiac catheterization and possible repeat angioplasty and stenting procedure which he will proceed with on March 15, 2017.     PCP: Vanna Ramirez MD              Past Medical History:   Diagnosis Date    3-vessel coronary artery disease 02/13/2004     Tech limited.  Low normal to mildly depressed LV systolic function. LVH. DDfx. LAE. PASP 25-30.  Acute bronchitis      CABG (coronary artery bypass graft) 04/14/1994     LIMA-LAD, Double Seq - D and Cx and SVG- RCA    CAD (coronary artery disease)       acute coronary syndrome    Cardiac catheterization 06/20/2012     LM patent. mLAD 100%. CX diffuse nonobstructive. OM1 patent stent. pRCA 100%. Dbl seq SVG to D1, OM1 100%. SVG to pRPDA 99% (3.5 x 23-mm Xience stent). Patent mid segment stent. LIMA to LAD ok.  Cardiac echocardiogram 11/16/2014     [facility]: EF 55-60%. DDfx. Normal RVSP. Mild TR. Neg bubble study for atrial shunt. No significant valvular pathology.  Cardiac nuclear imaging test, high risk 06/15/2012     Lg area of mod mid to distal anterior ischemia. Lg area of inferior ischemia. EF 57%. Mild mid-distal anterior, inferior hypk.  High risk pharm stress test.    Cervical spine pain      Deviated septum       s/p corrective surgery 7/1/2010    Disc disease, degenerative, cervical       joint pain, back pain    Dysphagia, unspecified(787.20)      Essential hypertension, benign      Gout with other specified manifestations(274.89)      Head injury       frequent headaches    Hypercholesterolemia      Insomnia, unspecified      Lumbar canal stenosis      Meniere's disease      Myocardial infarction, anterior wall (HCC)      DIEGO (obstructive sleep apnea)       on CPAP    S/P diskectomy 7/5/07     cervical    Spinal stenosis, lumbar region, without neurogenic claudication      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      Unspecified sinusitis (chronic)      Ventral hernia, unspecified, without mention of obstruction or gangrene       S/p repair 6/2003                  Past Surgical History:   Procedure Laterality Date    CARDIAC SURG PROCEDURE UNLIST   1/04     angioplasty of a proximal left anterior descending    CARDIAC SURG PROCEDURE UNLIST   6/2/08     angioplasty and stenting of totally obstructed saphenous vein graft to the posterior descending artery    HX APPENDECTOMY        HX CATARACT REMOVAL         bilateral    HX CERVICAL DISKECTOMY   7/07     and fusion    HX CORONARY ARTERY BYPASS GRAFT   4/14/94     X4, with LIMA to the LAD, double sequential to the diagonal and circumflex and single to the main right coronary artery    HX CORONARY STENT PLACEMENT   6/20/2012    HX GI   1992     two feet of colon removed    HX HEART CATHETERIZATION   6/02/08    HX HEART CATHETERIZATION   6/20/2012    HX HEENT   7/1/10     deviated nasal septum surgery    HX HERNIA REPAIR   7/2003     two prior hernia repairs in the past    HX OTHER SURGICAL        HX PTCA   6/20/2012    HX TONSILLECTOMY         as a child    RI SPINE SURGERY PROCEDURE UNLISTED         for crushed disk                        Current Outpatient Rx   Name   Route   Sig   Dispense   Refill    NITROSTAT 0.4 mg SL tablet       DISSOLVE 1 TABLET UNDER THE TONGUE EVERY 5 MINUTES AS NEEDED   25 Tab   0    indapamide (LOZOL) 1.25 mg tablet       TAKE 1 TABLET BY MOUTH DAILY   30 Tab   2    oxyCODONE-acetaminophen (PERCOCET)  mg per tablet   Oral   Take 1 Tab by mouth four (4) times daily as needed for Pain. Max Daily Amount: 4 Tabs.    120 Tab   0    simvastatin (ZOCOR) 40 mg tablet       TAKE 1 TABLET BY MOUTH ONCE DAILY   30 Tab   6    ramipril (ALTACE) 10 mg capsule       TAKE 1 CAPSULE BY MOUTH ONCE DAILY   30 Cap   11    allopurinol (ZYLOPRIM) 300 mg tablet       TAKE 1 TABLET BY MOUTH ONCE DAILY   90 Tab   3    metoprolol (LOPRESSOR) 50 mg tablet   Oral   Take 1 Tab by mouth two (2) times a day.   60 Tab   6    niacin (NIASPAN) 1,000 mg Tb24 tab   Oral   Take 1 tablet by mouth nightly.   30 tablet   11    MULTIVITAMINS W-MINERALS/LUT (CENTRUM SILVER PO)   Oral   Take 1 Tab by mouth daily.            aspirin delayed-release 81 mg tablet   Oral   Take 81 mg by mouth daily.                    No Known Allergies     Social        Social History   Substance Use Topics    Smoking status: Never Smoker    Smokeless tobacco: Never Used    Alcohol use Yes      Family history: family history includes Heart Disease in his father; Stroke in his father.      Review of Systems:   Constitutional: Negative. HEENT: Bilateral deafness using hearing aides  Respiratory: Negative. Cardiovascular: Negative. Genitourinary: Positive for 2-3+ nocturia   Gastrointestinal: Negative. Musculoskeletal: Positive for myalgias, back pain, joint pain and neck pain. Negative for falls.     Physical Exam:   The patient is an alert, oriented, well developed, well nourished 80 y.o.  male who was in no acute distress at the time of my examination. Visit Vitals    /68 (BP 1 Location: Left arm, BP Patient Position: Sitting)    Pulse (!) 51    Ht 5' 11\" (1.803 m)    Wt 93 kg (205 lb)    SpO2 98%    BMI 28.59 kg/m2          BP Readings from Last 3 Encounters:   03/13/17 130/68   02/03/17 110/60   01/04/17 116/55              Wt Readings from Last 3 Encounters:   03/13/17 93 kg (205 lb)   02/03/17 91.6 kg (202 lb)   01/04/17 89 kg (196 lb 3.2 oz)      HEENT: Conjuctiva white, mucosa moist, no pallor or cyanosis. Neck: Rather thick neck that is difficult to evaluate for jugular venous distention. No carotid bruits. Cardiovascular: Symmetrical with good excursion. There is a mid-sternotomy scar from distant past surgery. [place] appears to be displaced between the midclavicular line and anterior axillary line in the 5th left intercostal space. Normal S1 and S2, without appreciable murmurs, rubs, clicks or gallops auscultated. Lungs: Few rales in the right base, otherwise the lungs are clear to auscultation. Abdomen: Protuberant with a midline scar from ventral hernia surgery. Soft, but with mild epigastric tenderness. No masses or organomegaly.    Extremities: No edema, with 1-2 + peripheral pulses.      Review of Data: Please refer to past medical history for most recent cardiac testing.           Lab Results   Component Value Date/Time     Cholesterol, total 135 06/24/2016 03:19 PM     HDL Cholesterol 41 06/24/2016 03:19 PM     LDL, calculated 63.4 06/24/2016 03:19 PM     VLDL, calculated 30.6 06/24/2016 03:19 PM     Triglyceride 153 06/24/2016 03:19 PM     CHOL/HDL Ratio 3.3 06/24/2016 03:19 PM             Results for orders placed or performed in visit on 02/03/17   AMB POC EKG ROUTINE W/ 12 LEADS, INTER & REP Status: None     Narrative     Sinus bradycardia, rate 52.  Some very minor nonspecific ST changes, but this EKG is otherwise within normal limits and similar to the EKG of June 13, 2016.            Daniela Narvaez D.O., F.A.C.C.    [facility]  3/15/17

## 2017-03-15 NOTE — PROCEDURES
CARDIAC CATHETERIZATION LABORATORY PROCEDURE REPORT    Suze Ballesteros. is a 80 y.o. male    Medical Record Number: [id number]    Primary Care Provider: Flip Rockwell MD      Referral Provider: Sugar Gibson MD    PROCEDURE DATE: 3/15/2017    INDICATIONS:   CAD with abnormal nuclear scan. MD PERFORMING PROCEDURE: Amalia Martínez MD    PROCEDURE:  Left heart catheterization, coronary angiography and left ventriculography. ANESTHESIA: Moderate sedation and local anesthesia. EBL: 10-50 ml  CONTRAST USE: Approximately 130 ml  RADIATION: 1070 mGy    Risks, benefits, and alternatives to the procedure were explained to the patient prior to the procedure. Questions were answered in detail. The patient appeared to understand and appropriate informed consent was obtained. The patient was brought to the cardiac catheterization  laboratory in a post-absorptive state and he was prepped and draped in the usual sterile manner. Moderate sedation was achieved with a combination of Versed (midazolam) and Fentanyl. Lidocaine was used to secure local anesthesia. The right femoral artery was cannulated using a modified Seldinger technique and a 6 Sinhala sheath was inserted. 6 Lorraine and JR4 catheters were used to obtain selective bilateral coronary angiograms, under fluoroscopic guidance,  in multiple projections. LV angiography was performed not performed. The following were observed. FLUOROSCOPY: There was significant calcification. HEMODYNAMIC / ANGIOGRAPHIC DATA    · Coronary Angiography:  · The coronary circulation was right dominant. · Left main: Diffuse 30-40%. · Left anterior descending: Occluded after the first diagonal.  · Diagonal Branches: Long large vessel with 2 serial 50% lesions. · Circumflex: Mild 30-40%. · Obtuse Marginal Branches: Angiographically normal.  · Right coronary: 100%. · LIMA to LAD: small vessel with NICOLE 2 flow, diffuse 50%. · SVG to OM: Aortotomy site occluded.   · SVG to RCA: Patent with patent stents. The patient was transferred to the observation area with stable vital signs and in an asymptomatic state. The sheath will be pulled and hemostasis will be secured with a closure device or application of pressure. There was no apparent immediate complication. SUMMARY:  Moderate diffuse disease with no critical stenosis. The new abnormality noted on nuclear (lateral apical defect) coincides with the diagonal vessel. However, no critical stenosis noted on the diagonal angiogram.    RECOMMENDATIONS:  Post-procedure care will focus on aggressive risk factor modification.      Electronically signed: Suma Ken MD, [facility]

## 2017-03-30 ENCOUNTER — OFFICE VISIT (OUTPATIENT)
Dept: INTERNAL MEDICINE CLINIC | Age: 82
End: 2017-03-30

## 2017-03-30 VITALS
HEART RATE: 68 BPM | SYSTOLIC BLOOD PRESSURE: 110 MMHG | BODY MASS INDEX: 28.98 KG/M2 | OXYGEN SATURATION: 100 % | TEMPERATURE: 97.4 F | WEIGHT: 207 LBS | HEIGHT: 71 IN | DIASTOLIC BLOOD PRESSURE: 64 MMHG | RESPIRATION RATE: 16 BRPM

## 2017-03-30 DIAGNOSIS — Z13.31 SCREENING FOR DEPRESSION: ICD-10-CM

## 2017-03-30 DIAGNOSIS — M48.061 SPINAL STENOSIS, LUMBAR REGION, WITHOUT NEUROGENIC CLAUDICATION: ICD-10-CM

## 2017-03-30 DIAGNOSIS — I25.10 ATHEROSCLEROSIS OF NATIVE CORONARY ARTERY OF NATIVE HEART WITHOUT ANGINA PECTORIS: ICD-10-CM

## 2017-03-30 DIAGNOSIS — Z13.39 SCREENING FOR ALCOHOLISM: ICD-10-CM

## 2017-03-30 DIAGNOSIS — Z00.00 ROUTINE GENERAL MEDICAL EXAMINATION AT A HEALTH CARE FACILITY: ICD-10-CM

## 2017-03-30 DIAGNOSIS — I10 ESSENTIAL HYPERTENSION, BENIGN: Primary | ICD-10-CM

## 2017-03-30 PROBLEM — Z71.89 ADVANCE DIRECTIVE DISCUSSED WITH PATIENT: Status: ACTIVE | Noted: 2017-03-30

## 2017-03-30 RX ORDER — ASPIRIN 81 MG/1
81 TABLET ORAL 2 TIMES DAILY
COMMUNITY

## 2017-03-30 RX ORDER — OXYCODONE AND ACETAMINOPHEN 10; 325 MG/1; MG/1
1 TABLET ORAL
COMMUNITY
End: 2017-04-07 | Stop reason: SDUPTHER

## 2017-03-30 NOTE — PROGRESS NOTES
Dr. Ronald Lancaster referred [facility] , 1935, a 80 y.o. male for a Medicare Annual Wellness Visit (AWV), . Patient was accompanied by his wife, Ange Diego, during the entire visit. This is an Initial Medicare Annual Wellness Exam (AWV) (Performed 12 months after IPPE or effective date of Medicare Part B enrollment, Once in a lifetime)    I have reviewed the patient's medical history in detail and updated the computerized patient record. History     Past Medical History:   Diagnosis Date    3-vessel coronary artery disease 02/13/2004    Tech limited. Low normal to mildly depressed LV systolic function. LVH. DDfx. LAE. PASP 25-30. 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 mLAD 100%. CX diffuse nonobstructive. OM1 patent stent. pRCA 100%. Dbl seq SVG to D1, OM1 100%. SVG to pRPDA 99% (3.5 x 23-mm Xience stent). Patent mid segment stent. LIMA to LAD ok.  Cardiac echocardiogram 11/16/2014    [facility]:  EF 55-60%. DDfx. Normal RVSP. Mild TR. Neg bubble study for atrial shunt. No significant valvular pathology.  Cardiac nuclear imaging test, high risk 06/15/2012    Lg area of mod mid to distal anterior ischemia. Lg area of inferior ischemia. EF 57%. Mild mid-distal anterior, inferior hypk.   High risk pharm stress test.    Cervical spine pain     Deviated septum     s/p corrective surgery 7/1/2010    Disc disease, degenerative, cervical     joint pain, back pain    Dysphagia, unspecified(787.20)     Essential hypertension, benign     Gout with other specified manifestations(274.89)     Head injury     frequent headaches    Hypercholesterolemia     Insomnia, unspecified     Lumbar canal stenosis     Meniere's disease     Myocardial infarction, anterior wall ([address]     DIEGO (obstructive sleep apnea)     on CPAP    S/P diskectomy 7/5/07    cervical    Spinal stenosis, lumbar region, without neurogenic claudication     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     Unspecified sinusitis (chronic)     Ventral hernia, unspecified, without mention of obstruction or gangrene     S/p repair 6/2003      Past Surgical History:   Procedure Laterality Date    CARDIAC CATHETERIZATION  3/15/2017         CARDIAC SURG PROCEDURE UNLIST  1/04    angioplasty of a proximal left anterior descending    CARDIAC SURG PROCEDURE UNLIST  6/2/08    angioplasty and stenting of totally obstructed saphenous vein graft to the posterior descending artery    CORONARY ARTERY ANGIOGRAM  3/15/2017         HX APPENDECTOMY      HX CATARACT REMOVAL      bilateral    HX CERVICAL DISKECTOMY  7/07    and fusion    HX CORONARY ARTERY BYPASS GRAFT  4/14/94    X4, with LIMA to the LAD, double sequential to the diagonal and circumflex and single to the main right coronary artery    HX CORONARY STENT PLACEMENT  6/20/2012    HX GI  1992    two feet of colon removed    HX HEART CATHETERIZATION  6/02/08    HX HEART CATHETERIZATION  6/20/2012    HX HEENT  7/1/10    deviated nasal septum surgery    HX HERNIA REPAIR  7/2003    two prior hernia repairs in the past    HX OTHER SURGICAL      HX PTCA  6/20/2012    HX TONSILLECTOMY      as a child    CT [address] [facility]      for crushed disk     Current Outpatient Prescriptions   Medication Sig Dispense Refill    oxyCODONE-acetaminophen (PERCOCET 10)  mg per tablet Take 1 Tab by mouth every eight (8) hours as needed (for Breakthrough pain).  aspirin delayed-release 81 mg tablet Take 81 mg by mouth two (2) times a day.       indapamide (LOZOL) 1.25 mg tablet TAKE 1 TABLET BY MOUTH ONCE DAILY 30 Tab 2    potassium chloride (K-DUR, KLOR-CON) 10 mEq tablet TAKE 1 TABLET BY MOUTH 2 TIMES A DAY 60 Tab 1    oxyCODONE ER (OXYCONTIN) 20 mg ER tablet Take 1 po every 12 hours for chronic pain 60 Tab 0    simvastatin (ZOCOR) 40 mg tablet TAKE 1 TABLET BY MOUTH ONCE DAILY 30 Tab 6    NAMENDA XR 28 mg capsule Take 28 mg by mouth daily. 1    metoprolol tartrate (LOPRESSOR) 50 mg tablet TAKE 1 TABLET BY MOUTH 2 TIMES A DAY 60 Tab 6    NITROSTAT 0.4 mg SL tablet DISSOLVE 1 TABLET UNDER THE TONGUE EVERY 5 MINUTES AS NEEDED 25 Tab 0    allopurinol (ZYLOPRIM) 300 mg tablet TAKE 1 TABLET BY MOUTH ONCE DAILY 90 Tab 3    niacin (NIASPAN) 1,000 mg Tb24 tab Take 1 tablet by mouth nightly. 30 tablet 11    MULTIVITAMINS W-MINERALS/LUT (CENTRUM SILVER PO) Take 1 Tab by mouth daily.        Medications Discontinued During This Encounter   Medication Reason    oxyCODONE-acetaminophen (PERCOCET)  mg per tablet Therapy Completed    oxyCODONE-acetaminophen (PERCOCET)  mg per tablet Therapy Completed    oxyCODONE-acetaminophen (PERCOCET)  mg per tablet Therapy Completed    fluticasone (FLONASE) 50 mcg/actuation nasal spray Not A Current Medication    oxyCODONE ER (OXYCONTIN) 20 mg ER tablet Not A Current Medication    oxyCODONE-acetaminophen (PERCOCET 10)  mg per tablet Dose Adjustment    aspirin delayed-release 81 mg tablet Dose Adjustment    oxyCODONE ER (OXYCONTIN) 20 mg ER tablet Duplicate Order    oxyCODONE ER (OXYCONTIN) 20 mg ER tablet Duplicate Order       No Known Allergies  Family History   Problem Relation Age of Onset    Heart Disease Father     Stroke Father      Social History   Substance Use Topics    Smoking status: Never Smoker    Smokeless tobacco: Never Used    Alcohol use Yes     Patient Active Problem List   Diagnosis Code    Myocardial infarction, anterior wall (HCC) I21.09    DIEGO (obstructive sleep apnea) G47.33    Deviated septum J34.2    Coronary atherosclerosis of native coronary artery I25.10    Dyslipidemia E78.5    DDD (degenerative disc disease), cervical M50.30    S/P repair of ventral hernia Z98.890, Z87.19    Angina pectoris (HCC) I20.9    Dysphagia, unspecified(787.20)     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 H47.9    Unspecified sinusitis (chronic) J32.9    Acute bronchitis J20.9    Gout with other specified manifestations(274.89)     Insomnia, unspecified G47.00    Spinal stenosis, lumbar region, without neurogenic claudication M48.06    Essential hypertension, benign I10    Cervical spine pain M54.2    DISH (diffuse idiopathic skeletal hyperostosis) M48.10    Encephalopathy G93.40       Depression Risk Factor Screening:     PHQ 2 / 9, over the last two weeks 3/30/2017   Little interest or pleasure in doing things Not at all   Feeling down, depressed or hopeless Not at all   Total Score PHQ 2 0     Alcohol Risk Factor Screening: On any occasion during the past 3 months, have you had more than 4 drinks containing alcohol? No    Do you average more than 14 drinks per week? No    Patient has had alcohol in approximately 30 years. Functional Ability and Level of Safety:     Hearing Loss   none    Activities of Daily Living   Self-care. Requires assistance with: paying bills/managing money. Otherwise he is independent.   ADL Assessment 3/30/2017   Feeding yourself No Help Needed   Getting from bed to chair No Help Needed   Getting dressed No Help Needed   Bathing or showering No Help Needed   Walk across the room (includes cane/walker) No Help Needed   Using the telphone No Help Needed   Taking your medications No Help Needed   Preparing meals No Help Needed   Managing money (expenses/bills) Help Needed   Moderately strenuous housework (laundry) No Help Needed   Shopping for personal items (toiletries/medicines) No Help Needed   Shopping for groceries No Help Needed   Driving No Help Needed   Climbing a flight of stairs No Help Needed   Getting to places beyond walking distances No Help Needed       Fall Risk     Fall Risk Assessment, last 12 mths 3/30/2017   Able to walk? Yes   Fall in past 12 months? No     Abuse Screen   Patient is not abused    Review of Systems   Not required    Physical Examination     No exam data present    Evaluation of Cognitive Function:  Mood/affect:  neutral  Appearance: age appropriate, casually dressed and within normal Limits  Family member/caregiver input: patient's wife provided the medication information as well as much of the information about other health care providers as patient has had a stroke and has some difficulty finding words at times. No exam performed by pharmacist today, not required for [facility] Annual Wellness Visit. Patient to be seen by Dr. Kit Chun separately. Visit Vitals    /64 (BP 1 Location: Left arm, BP Patient Position: Sitting)    Pulse 68    Temp 97.4 °F (36.3 °C) (Tympanic)    Resp 16    Ht 5' 11\" (1.803 m)    Wt 207 lb (93.9 kg)    SpO2 100%    BMI 28.87 kg/m2       Patient Care Team:  Flip Rockwell MD as PCP - General (Internal Medicine)  Mala Dawkins MD as Physician (Surgery)  Huseyin Nicole MD (Physical Medicine and Rehab)  Jessica Randall MD as Physician (Neurology)  Magali Suarez OD as Physician (Ophthalmology)  Marija Doyle DO (Cardiology)    Advice/Referrals/Counseling   Education and counseling provided:  Are appropriate based on today's review and evaluation  End-of-Life planning (with patient's consent)  Pneumococcal Vaccine  Prostate cancer screening tests (PSA, covered annually)  Cardiovascular screening blood test  Diabetes screening test  Tdap      Assessment/Plan       ICD-10-CM ICD-9-CM    1. Routine general medical examination at a health care facility Z00.00 V70.0    2. Screening for alcoholism Z13.89 V79.1    3. Screening for depression Z13.89 V79.0      4.  Current treatment plan is effective, no change in therapy. Lab results and schedule of future lab studies reviewed with patient.   Discussed with Dr. Kit Chun that A1C will be checked with next routine labs as patient has a history of diabetes and is not checking BG at home, labs indicate elevated BG on previous labs but they weren't fasting. Cardiovascular risk and specific lipid/LDL goals reviewed. 5.  Reviewed medications and side effects in detail. Patient did not bring his medications to the visit but his wife provided information regarding doses and directions. During the patient interview,  the following was noted:    Patient takes an enteric coated baby aspirin 81 mg twice daily. Patient is no longer taking Flonase. Namenda SR is 28 mg once daily. Old/inactive pain medications were removed from the profile (several oxycontin orders -2  and 1 with old once daily directions and 2  percocet orders). Percocet is now 1 tablet every 8 hours as needed for breakthrough pain (not 4 times per day) per wife after Oxycontin was increased to twice daily. 6. Screenings and Immunizations (see patient instructions for chart):  PSA: Up to date, due for repeat per Dr. Marveen Homans  Colonoscopy: Up to date, per patient's wife, Dr. Shady Toledo advised them that repeat screening was not indicated  Glaucoma screening/Eye exam: Up to date, due for repeat per Dr. Cedrick Gibbs in May 2017  Lipid panel: Up to date, due for repeat in 2017 (on simvastatin and niacin)  Diabetes: Up to date with glucose earlier this month however discussed with Dr. Marveen Homans and recommend checking A1C with next labs as patient has a history of diabetes several years ago and was taking metformin but that was stopped sometime in  or early  for low blood sugars, non-fasting blood glucose has been slightly elevated on recent labs     Immunizations:  Pneumococcal:  Recommended that patient receive Prevnar-13 in 2017 (1 year after PPSV23) here or at his pharmacy with pharmacy records faxed to the office.   Influenza:  Complete for this season, Recommended that patient receive here or at his pharmacy again in the Fall.  Zoster:  Complete. Tdap:  Recommended that patient receive at his pharmacy and have pharmacy records faxed to the office. 7.  Advanced Care Planning:  The patient was advised and counseled regarding advanced directives. The patient was provided with an information packet and an advanced directive form to complete and return a copy to the office to have on file. Patient verbalized understanding of information presented. Answered all of the patient's questions. AVS information was reviewed with patient and wife and will be printed on checkout.     Eric Doran, OctavianoD, BCACP

## 2017-03-30 NOTE — PATIENT INSTRUCTIONS
Please review the Advanced Directives materials and if you complete them, please bring a copy to the office to be scanned into your record. Medicare Part B Preventive Services Limitations Recommendation Scheduled   Cardiovascular Screening Blood Tests (every 5 years)  Total cholesterol, HDL, Triglycerides Order as a panel if possible Last: 6/24/2016    Every year if on treatment Next: June 2017 (on simvastatin, niacin)   Colorectal Cancer Screening  -Fecal occult blood test (annual)  -Flexible sigmoidoscopy (5y)  -Screening colonoscopy (10y)  -Barium Enema  Last: 3/21/2014  polyp    Every 5-10 years depending on your colonoscopy result starting at age 48 years, usual cutoff is age 76 years Next: per Dr. Terese Cheung, routine screening no longer indicated (age > 76)     Diabetes Screening Tests (at least every 3 years, Medicare covers annually or at 6-month intervals for prediabetic patients)    Fasting blood sugar (FBS) or glucose tolerance test (GTT) Patient must be diagnosed with one of the following:  -Hypertension, Dyslipidemia, obesity, previous impaired FBS or GTT  Or any two of the following: overweight, FH of diabetes, age ? 72, history of gestational diabetes, birth of baby weighing more than 9 pounds Last: 3/14/2017    Every 6-12 months depending on your result due to history of diabetes   Next: Per Dr. Mirtha Carson with routine labs, may want to consider checking A1C   Glaucoma Screening (no USPSTF recommendation) Diabetes mellitus, family history, , age 48 or over,  American, age 72 or over Last: 4/19/16    Every 1-2 years Next: April/May 2017 per Dr. Joe Doty (annually up to age 76)  - Digital rectal exam (DIDIER)  - Prostate specific antigen (PSA) Annually (age 48 or over), DIDIER not paid separately when covered E/M service is provided on same date Last: 6/24/2016  PSA 3.0 ng/mL Next:  discuss frequency of repeat monitoring with Dr. Toñito Santos Vaccination (annually)  Last: 9/7/2016    Every Fall Complete for this season, please get one again this Fall   Shingles Vaccination Medicare Part D prescription drug plan A shingles vaccine is recommended once in a lifetime after age 61  Last: 11/28/2016 Complete   Pneumococcal Vaccination (once after 65)  Last:  Pneumovax: 9/7/2016    Prevnar-13: none   Next: Prevnar-13 due after 9/2017 (1 year after Pneumovax), can receive at the office when available or at your pharmacy (have records faxed to the office). Tdap - tetanus , diphtheria, and pertussis (also called whooping cough) adult booster at least once if not given previously Medicare Part D prescription drug plan or self-pay A Tdap vaccine is recommended once in a lifetime as an average risk adult. Please discuss with Dr. Santi Shetty, consider getting one at your pharmacy (have records faxed to the office). This may NOT be covered under your prescription benefits  check with your pharmacist regarding copay.

## 2017-03-30 NOTE — PROGRESS NOTES
1. Have you been to the ER, urgent care clinic since your last visit? Hospitalized since your last visit? Yes When: 3/15 Where: [facility] Reason for visit: cath    2. Have you seen or consulted any other health care providers outside of the [address] [place] [address] since your last visit? Include any pap smears or colon screening.  No

## 2017-03-31 NOTE — PROGRESS NOTES
The patient presents to the office today with the chief complaint of hypertension    HPI    The patient remains on medications for hypertension. he is tolerating the medications well. The patient has coronary artery disease. A recent cardiac catheterization revealed disease in one small branch. The patient has continued complaints low back pain with stiffness. Review of Systems   Respiratory: Negative for shortness of breath. Cardiovascular: Negative for chest pain and leg swelling. Musculoskeletal: Positive for back pain. No Known Allergies    Current Outpatient Prescriptions   Medication Sig Dispense Refill    oxyCODONE-acetaminophen (PERCOCET 10)  mg per tablet Take 1 Tab by mouth every eight (8) hours as needed (for Breakthrough pain).  aspirin delayed-release 81 mg tablet Take 81 mg by mouth two (2) times a day.  indapamide (LOZOL) 1.25 mg tablet TAKE 1 TABLET BY MOUTH ONCE DAILY 30 Tab 2    potassium chloride (K-DUR, KLOR-CON) 10 mEq tablet TAKE 1 TABLET BY MOUTH 2 TIMES A DAY 60 Tab 1    oxyCODONE ER (OXYCONTIN) 20 mg ER tablet Take 1 po every 12 hours for chronic pain 60 Tab 0    simvastatin (ZOCOR) 40 mg tablet TAKE 1 TABLET BY MOUTH ONCE DAILY 30 Tab 6    NAMENDA XR 28 mg capsule Take 28 mg by mouth daily. 1    metoprolol tartrate (LOPRESSOR) 50 mg tablet TAKE 1 TABLET BY MOUTH 2 TIMES A DAY 60 Tab 6    NITROSTAT 0.4 mg SL tablet DISSOLVE 1 TABLET UNDER THE TONGUE EVERY 5 MINUTES AS NEEDED 25 Tab 0    allopurinol (ZYLOPRIM) 300 mg tablet TAKE 1 TABLET BY MOUTH ONCE DAILY 90 Tab 3    niacin (NIASPAN) 1,000 mg Tb24 tab Take 1 tablet by mouth nightly. 30 tablet 11    MULTIVITAMINS W-MINERALS/LUT (CENTRUM SILVER PO) Take 1 Tab by mouth daily. Past Medical History:   Diagnosis Date    3-vessel coronary artery disease 02/13/2004    Tech limited. Low normal to mildly depressed LV systolic function. LVH. DDfx. LAE. PASP 25-30.    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 mLAD 100%. CX diffuse nonobstructive. OM1 patent stent. pRCA 100%. Dbl seq SVG to D1, OM1 100%. SVG to pRPDA 99% (3.5 x 23-mm Xience stent). Patent mid segment stent. LIMA to LAD ok.  Cardiac echocardiogram 11/16/2014    [facility]:  EF 55-60%. DDfx. Normal RVSP. Mild TR. Neg bubble study for atrial shunt. No significant valvular pathology.  Cardiac nuclear imaging test, high risk 06/15/2012    Lg area of mod mid to distal anterior ischemia. Lg area of inferior ischemia. EF 57%. Mild mid-distal anterior, inferior hypk.   High risk pharm stress test.    Cervical spine pain     Deviated septum     s/p corrective surgery 7/1/2010    Disc disease, degenerative, cervical     joint pain, back pain    Dysphagia, unspecified(787.20)     Essential hypertension, benign     Gout with other specified manifestations(274.89)     Head injury     frequent headaches    Hypercholesterolemia     Insomnia, unspecified     Lumbar canal stenosis     Meniere's disease     Myocardial infarction, anterior wall (HCC)     DIEGO (obstructive sleep apnea)     on CPAP    S/P diskectomy 7/5/07    cervical    Spinal stenosis, lumbar region, without neurogenic claudication     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     Unspecified sinusitis (chronic)     Ventral hernia, unspecified, without mention of obstruction or gangrene     S/p repair 6/2003       Past Surgical History:   Procedure Laterality Date    CARDIAC CATHETERIZATION  3/15/2017         CARDIAC SURG PROCEDURE UNLIST  1/04    angioplasty of a proximal left anterior descending    CARDIAC SURG PROCEDURE UNLIST  6/2/08    angioplasty and stenting of totally obstructed saphenous vein graft to the posterior descending artery    CORONARY ARTERY ANGIOGRAM  3/15/2017         HX APPENDECTOMY      HX CATARACT REMOVAL      bilateral    HX CERVICAL DISKECTOMY  7/07    and fusion    HX CORONARY ARTERY BYPASS GRAFT  4/14/94    X4, with LIMA to the LAD, double sequential to the diagonal and circumflex and single to the main right coronary artery    HX CORONARY STENT PLACEMENT  6/20/2012    HX GI  1992    two feet of colon removed    HX HEART CATHETERIZATION  6/02/08    HX HEART CATHETERIZATION  6/20/2012    HX HEENT  7/1/10    deviated nasal septum surgery    HX HERNIA REPAIR  7/2003    two prior hernia repairs in the past    HX OTHER SURGICAL      HX PTCA  6/20/2012    HX TONSILLECTOMY      as a child    [place] [address] [facility]      for crushed disk       Social History     Social History    Marital status:      Spouse name: N/A    Number of children: N/A    Years of education: N/A     Occupational History    Not on file. Social History Main Topics    Smoking status: Never Smoker    Smokeless tobacco: Never Used    Alcohol use Yes    Drug use: No    Sexual activity: No     Other Topics Concern    Not on file     Social History Narrative       Patient does not have an advanced directive on file    Visit Vitals    /64 (BP 1 Location: Left arm, BP Patient Position: Sitting)    Pulse 68    Temp 97.4 °F (36.3 °C) (Tympanic)    Resp 16    Ht 5' 11\" (1.803 m)    Wt 207 lb (93.9 kg)    SpO2 100%    BMI 28.87 kg/m2       Physical Exam   No Cervical Lymphadenopathy  No Supraclavicular Lymphadenopathy  Thyroid is Normal  Lungs are clear to ausculation and percussion  Heart:  S1 S2 are normal, No gallops, No mummers  No Carotid Bruits  Abdomen:  Normal Bowel Sounds. No masses. No Hepatomegaly or Splenomegly  LE:  Strong Pedal Pulses.   No Edema      BMI:  [facility] Outpatient Visit on 03/14/2017   Component Date Value Ref Range Status    Sodium 03/14/2017 143  136 - 145 mmol/L Final    Potassium 03/14/2017 3.7  3.5 - 5.5 mmol/L Final    Chloride 03/14/2017 102  100 - 108 mmol/L Final    CO2 03/14/2017 34* 21 - 32 mmol/L Final    Anion gap 03/14/2017 7  3.0 - 18 mmol/L Final    Glucose 03/14/2017 124* 74 - 99 mg/dL Final    BUN 03/14/2017 22* 7.0 - 18 MG/DL Final    Creatinine 03/14/2017 0.89  0.6 - 1.3 MG/DL Final    BUN/Creatinine ratio 03/14/2017 25* 12 - 20   Final    GFR est AA 03/14/2017 >60  >60 ml/min/1.73m2 Final    GFR est non-AA 03/14/2017 >60  >60 ml/min/1.73m2 Final    Comment: (NOTE)  Estimated GFR is calculated using the Modification of Diet in Renal   Disease (MDRD) Study equation, reported for both  Americans   (GFRAA) and non- Americans (GFRNA), and normalized to 1.73m2   body surface area. The physician must decide which value applies to   the patient. The MDRD study equation should only be used in   individuals age 25 or older. It has not been validated for the   following: pregnant women, patients with serious comorbid conditions,   or on certain medications, or persons with extremes of body size,   muscle mass, or nutritional status.  Calcium 03/14/2017 8.8  8.5 - 10.1 MG/DL Final    Bilirubin, total 03/14/2017 0.6  0.2 - 1.0 MG/DL Final    ALT (SGPT) 03/14/2017 18  16 - 61 U/L Final    AST (SGOT) 03/14/2017 14* 15 - 37 U/L Final    Alk.  phosphatase 03/14/2017 61  45 - 117 U/L Final    Protein, total 03/14/2017 6.3* 6.4 - 8.2 g/dL Final    Albumin 03/14/2017 3.7  3.4 - 5.0 g/dL Final    Globulin 03/14/2017 2.6  2.0 - 4.0 g/dL Final    A-G Ratio 03/14/2017 1.4  0.8 - 1.7   Final    WBC 03/14/2017 6.1  4.6 - 13.2 K/uL Final    RBC 03/14/2017 4.48* 4.70 - 5.50 M/uL Final    HGB 03/14/2017 13.4  13.0 - 16.0 g/dL Final    HCT 03/14/2017 41.7  36.0 - 48.0 % Final    MCV 03/14/2017 93.1  74.0 - 97.0 FL Final    MCH 03/14/2017 29.9  24.0 - 34.0 PG Final    MCHC 03/14/2017 32.1  31.0 - 37.0 g/dL Final    RDW 03/14/2017 14.1  11.6 - 14.5 % Final    PLATELET 36/23/2378 577  135 - 420 K/uL Final    MPV 03/14/2017 11.6  9.2 - 11.8 FL Final    NEUTROPHILS 03/14/2017 47  40 - 73 % Final    LYMPHOCYTES 03/14/2017 38  21 - 52 % Final    MONOCYTES 03/14/2017 8  3 - 10 % Final    EOSINOPHILS 03/14/2017 6* 0 - 5 % Final    BASOPHILS 03/14/2017 1  0 - 2 % Final    ABS. NEUTROPHILS 03/14/2017 2.9  1.8 - 8.0 K/UL Final    ABS. LYMPHOCYTES 03/14/2017 2.3  0.9 - 3.6 K/UL Final    ABS. MONOCYTES 03/14/2017 0.5  0.05 - 1.2 K/UL Final    ABS. EOSINOPHILS 03/14/2017 0.4  0.0 - 0.4 K/UL Final    ABS. BASOPHILS 03/14/2017 0.0  0.0 - 0.06 K/UL Final    DF 03/14/2017 AUTOMATED    Final    Prothrombin time 03/14/2017 12.3  11.5 - 15.2 sec Final    INR 03/14/2017 0.9  0.8 - 1.2   Final    Comment:            INR Therapeutic Ranges         (on stable oral anticoagulant):     INDICATION                INR  DVT/PE/Atrial Fib          2.0-3.0  MI/Mechanical Heart Valve  2.5-3.5     Hospital Outpatient Visit on 02/10/2017   Component Date Value Ref Range Status    Test indication 02/10/2017 cad   Preliminary    Functional capacity 02/10/2017 Could Not Be Adequately Assessed   Preliminary    ECG Interp. Before Exercise 02/10/2017    Preliminary                    Value:Normal Sinus Rhythm  RBBB      ECG Interp. During Exercise 02/10/2017 none   Preliminary    Max. Systolic BP 64/17/0475 644  mmHg Preliminary    Max. Diastolic BP 48/03/6900 70  mmHg Preliminary    Max. Heart rate 02/10/2017 84  BPM Preliminary    Peak Ex METs 02/10/2017 1.0  METS Preliminary    Protocol name 02/10/2017 LEXISCAN           Preliminary       . No results found for any visits on 03/30/17. Assessment / Plan      ICD-10-CM ICD-9-CM    1. Essential hypertension, benign I10 401.1    2. Routine general medical examination at a health care facility Z00.00 V70.0    3. Screening for alcoholism Z13.89 V79.1    4. Screening for depression Z13.89 V79.0    5.  Atherosclerosis of native coronary artery of native heart without angina pectoris I25.10 414.01    6. Spinal stenosis, lumbar region, without neurogenic claudication M48.06 724.02        Follow-up Disposition:  Return in about 5 months (around 9/5/2017). I asked Suze Mcclure if he has any questions and I answered the questions. Suze Brewer. states that he understands the treatment plan and agrees with the treatment plan

## 2017-04-03 RX ORDER — ALLOPURINOL 300 MG/1
TABLET ORAL
Qty: 90 TAB | Refills: 3 | Status: SHIPPED | OUTPATIENT
Start: 2017-04-03 | End: 2018-01-01 | Stop reason: SDUPTHER

## 2017-04-05 DIAGNOSIS — M48.10 DISH (DIFFUSE IDIOPATHIC SKELETAL HYPEROSTOSIS): Chronic | ICD-10-CM

## 2017-04-05 NOTE — TELEPHONE ENCOUNTER
Last Visit: 01/04/2017 with MD Leilani Mathew    Next Appointment: 04/26/2017 with MD Leilani Mathew; Provider Cancellation 04/05  Previous Refill Encounters: 03/06/2017 per MD Leilani Mathew #[id number]     Requested Prescriptions     Pending Prescriptions Disp Refills    oxyCODONE ER (OXYCONTIN) 20 mg ER tablet 60 Tab 0     Sig: Take 1 po every 12 hours for chronic pain

## 2017-04-06 RX ORDER — OXYCODONE HCL 20 MG/1
TABLET, FILM COATED, EXTENDED RELEASE ORAL
Qty: 60 TAB | Refills: 0 | Status: SHIPPED | OUTPATIENT
Start: 2017-04-06 | End: 2017-04-26 | Stop reason: SDUPTHER

## 2017-04-06 NOTE — TELEPHONE ENCOUNTER
Patient came in to  script that was approved. States that he was also supposed to get refill on his Percocet. The script was only for Oxycontin. I went to the back to see if Lavern Asher was here to help take care of this, but she was not here. Patient would like to be able to pick this up tomorrow since he is older and has a hard time finding someone to get him here. Patient can be reached at [phone] or [phone].

## 2017-04-07 RX ORDER — OXYCODONE AND ACETAMINOPHEN 10; 325 MG/1; MG/1
1 TABLET ORAL
Qty: 90 TAB | Refills: 0 | Status: SHIPPED | OUTPATIENT
Start: 2017-04-07 | End: 2017-04-26 | Stop reason: SDUPTHER

## 2017-04-12 ENCOUNTER — OFFICE VISIT (OUTPATIENT)
Dept: CARDIOLOGY CLINIC | Age: 82
End: 2017-04-12

## 2017-04-12 VITALS
DIASTOLIC BLOOD PRESSURE: 66 MMHG | WEIGHT: 202 LBS | HEART RATE: 61 BPM | SYSTOLIC BLOOD PRESSURE: 128 MMHG | HEIGHT: 71 IN | BODY MASS INDEX: 28.28 KG/M2 | OXYGEN SATURATION: 98 %

## 2017-04-12 DIAGNOSIS — R94.39 ABNORMAL MYOCARDIAL PERFUSION STUDY: ICD-10-CM

## 2017-04-12 DIAGNOSIS — I10 ESSENTIAL HYPERTENSION, BENIGN: ICD-10-CM

## 2017-04-12 DIAGNOSIS — E78.5 DYSLIPIDEMIA: ICD-10-CM

## 2017-04-12 DIAGNOSIS — I25.10 ATHEROSCLEROSIS OF NATIVE CORONARY ARTERY OF NATIVE HEART WITHOUT ANGINA PECTORIS: Primary | ICD-10-CM

## 2017-04-12 RX ORDER — POTASSIUM CHLORIDE 750 MG/1
TABLET, EXTENDED RELEASE ORAL
Qty: 60 TAB | Refills: 1 | Status: SHIPPED | OUTPATIENT
Start: 2017-04-12 | End: 2017-06-09 | Stop reason: SDUPTHER

## 2017-04-12 NOTE — PROGRESS NOTES
HPI: I saw Kori Nunez. Angelita Vazquez, in my office today in cardiovascular evaluation regarding his chronic underlying coronary artery disease prior to a planned repeat cardiac catheterization and stenting procedure due to an abnormal screening nuclear myocardial perfusion study. Mr. Luis Richardson is a very pleasant 80year old white male with history of coronary artery disease, status post coronary artery bypass grafting surgery x four in April of 1994, with several angioplasty and stenting procedures since that time. Due to a very abnormal and high risk screening nuclear myocardial perfusion study completed on February 10, 2017 he had a repeat cardiac catheterization on March 15, 2017 by Dr. Yohana Rodriguez with the following findings:    · Coronary Angiography:  · The coronary circulation was right dominant. · Left main: Diffuse 30-40%. · Left anterior descending: Occluded after the first diagonal.  · Diagonal Branches: Long large vessel with 2 serial 50% lesions. · Circumflex: Mild 30-40%. · Obtuse Marginal Branches: Angiographically normal.  · Right coronary: 100%. · LIMA to LAD: small vessel with NICOLE 2 flow, diffuse 50%. · SVG to OM: Aortotomy site occluded. · SVG to RCA: Patent with patent stents.     The above findings demonstrated moderate diffuse disease without critical stenosis. The new abnormality and the nuclear scan coincided with a diagonal vessel lesion, but there is no significant stenosis noted so continued medical therapy was recommended. He comes in today wife and tells me that he is doing very well he denies any cardiovascular symptomatology at this time. Encounter Diagnoses   Name Primary?  Atherosclerosis of native coronary artery of native heart without angina pectoris Yes    Markedly abnormal and high risk myocardial perfusion study on 2/10/2017     Essential hypertension, benign     Dyslipidemia        Discussion:  This gentleman appears to be doing about as well as we could expect that I really have no recommendations for change at this time. He is markedly abnormal nuclear myocardial perfusion study was falsely positive in view of the cardiac catheterization findings with possibly the two 50% narrowing in series causing some mild perfusion abnormality in the anterolateral wall, but this did not appear to be severe enough to warrant any intervention and since he is asymptomatic we are simply going to continue medical therapy. He tells me that his lipid profile has been doing quite well as managed through Dr. Sen Listen office and in fact latest lipid profile that I have a copy of was completed on June 24, 2016 demonstrated total cholesterol 135 with triglycerides 153, HDL 41, LDL of 63.4, and VLDL 30.6 which I think is good control on his Zocor 40 mg daily. His blood pressure appears to be well-controlled today and his EKG appears to be stable some simply going to plan to see him again in several months or sooner if any new cardiovascular symptoms surface in the interim. PCP: Kat Cohen MD       Past Medical History:   Diagnosis Date    3-vessel coronary artery disease 02/13/2004    Tech limited. Low normal to mildly depressed LV systolic function. LVH. DDfx. LAE. PASP 25-30. 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 mLAD 100%. CX diffuse nonobstructive. OM1 patent stent. pRCA 100%. Dbl seq SVG to D1, OM1 100%. SVG to pRPDA 99% (3.5 x 23-mm Xience stent). Patent mid segment stent. LIMA to LAD ok.  Cardiac echocardiogram 11/16/2014    [facility]:  EF 55-60%. DDfx. Normal RVSP. Mild TR. Neg bubble study for atrial shunt. No significant valvular pathology.  Cardiac nuclear imaging test, high risk 06/15/2012    Lg area of mod mid to distal anterior ischemia.   Lg area of inferior ischemia. EF 57%. Mild mid-distal anterior, inferior hypk.   High risk pharm stress test.    Cervical spine pain     Deviated septum     s/p corrective surgery 7/1/2010    Disc disease, degenerative, cervical     joint pain, back pain    Dysphagia, unspecified     Essential hypertension, benign     Gout with other specified manifestations     Head injury     frequent headaches    Hypercholesterolemia     Insomnia, unspecified     Lumbar canal stenosis     Meniere's disease     Myocardial infarction, anterior wall (HCC)     DIEGO (obstructive sleep apnea)     on CPAP    S/P diskectomy 7/5/07    cervical    Spinal stenosis, lumbar region, without neurogenic claudication     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     Unspecified sinusitis (chronic)     Ventral hernia, unspecified, without mention of obstruction or gangrene     S/p repair 6/2003         Past Surgical History:   Procedure Laterality Date    CARDIAC CATHETERIZATION  3/15/2017         CARDIAC SURG PROCEDURE UNLIST  1/04    angioplasty of a proximal left anterior descending    CARDIAC SURG PROCEDURE UNLIST  6/2/08    angioplasty and stenting of totally obstructed saphenous vein graft to the posterior descending artery    CORONARY ARTERY ANGIOGRAM  3/15/2017         HX APPENDECTOMY      HX CATARACT REMOVAL      bilateral    HX CERVICAL DISKECTOMY  7/07    and fusion    HX CORONARY ARTERY BYPASS GRAFT  4/14/94    X4, with LIMA to the LAD, double sequential to the diagonal and circumflex and single to the main right coronary artery    HX CORONARY STENT PLACEMENT  6/20/2012    HX GI  1992    two feet of colon removed    HX HEART CATHETERIZATION  6/02/08    HX HEART CATHETERIZATION  6/20/2012    HX HEENT  7/1/10    deviated nasal septum surgery    HX HERNIA REPAIR  7/2003    two prior hernia repairs in the past    HX OTHER SURGICAL      HX PTCA  6/20/2012    HX TONSILLECTOMY      as a child    [place] SPINE SURGERY PROCEDURE UNLISTED      for crushed disk         Current Outpatient Rx   Name  Route  Sig  Dispense  Refill    NITROSTAT 0.4 mg SL tablet        DISSOLVE 1 TABLET UNDER THE TONGUE EVERY 5 MINUTES AS NEEDED    25 Tab    0      indapamide (LOZOL) 1.25 mg tablet        TAKE 1 TABLET BY MOUTH DAILY    30 Tab    2      oxyCODONE-acetaminophen (PERCOCET)  mg per tablet    Oral    Take 1 Tab by mouth four (4) times daily as needed for Pain. Max Daily Amount: 4 Tabs. 120 Tab    0      simvastatin (ZOCOR) 40 mg tablet        TAKE 1 TABLET BY MOUTH ONCE DAILY    30 Tab    6      ramipril (ALTACE) 10 mg capsule        TAKE 1 CAPSULE BY MOUTH ONCE DAILY    30 Cap    11      allopurinol (ZYLOPRIM) 300 mg tablet        TAKE 1 TABLET BY MOUTH ONCE DAILY    90 Tab    3      metoprolol (LOPRESSOR) 50 mg tablet    Oral    Take 1 Tab by mouth two (2) times a day. 60 Tab    6      niacin (NIASPAN) 1,000 mg Tb24 tab    Oral    Take 1 tablet by mouth nightly. 30 tablet    11      MULTIVITAMINS W-MINERALS/LUT (CENTRUM SILVER PO)    Oral    Take 1 Tab by mouth daily.  aspirin delayed-release 81 mg tablet    Oral    Take 81 mg by mouth daily. No Known Allergies    Social   Social History   Substance Use Topics    Smoking status: Never Smoker    Smokeless tobacco: Never Used    Alcohol use Yes     Family history: family history includes Heart Disease in his father; Stroke in his father. Review of Systems:    Constitutional: Negative. Respiratory: Negative. Cardiovascular: Negative. Gastrointestinal: Negative. Musculoskeletal: Positive for back pain and neck pain. Negative for falls, joint pain and myalgias. Physical Exam:   The patient is an alert, oriented, well developed, well nourished 80 y.o.  male who was in no acute distress at the time of my examination.   Visit Vitals    /66    Pulse 61    Ht 5' 11\" (1.803 m)    Wt 91.6 kg (202 lb)    SpO2 98%    BMI 28.17 kg/m2      BP Readings from Last 3 Encounters:   04/12/17 128/66   03/30/17 110/64   03/15/17 104/44        Wt Readings from Last 3 Encounters:   04/12/17 91.6 kg (202 lb)   03/30/17 93.9 kg (207 lb)   03/15/17 87.1 kg (192 lb)     HEENT:  Conjuctiva white, mucosa moist, no pallor or cyanosis. Neck: Rather thick neck that is difficult to evaluate for jugular venous distention. No carotid bruits. Cardiovascular: Symmetrical with good excursion. There is a mid-sternotomy scar from distant past surgery. [place] appears to be displaced between the midclavicular line and anterior axillary line in the 5th left intercostal space. Normal S1 and S2, without appreciable murmurs, rubs, clicks or gallops auscultated. Lungs: Few rales in the right base, otherwise the lungs are clear to auscultation. Abdomen: Protuberant with a midline scar from ventral hernia surgery. Soft, but with mild epigastric tenderness. No masses or organomegaly. Extremities: No edema, with 1-2 + peripheral pulses. Review of Data: Please refer to past medical history for most recent cardiac testing. Lab Results   Component Value Date/Time    Cholesterol, total 135 06/24/2016 03:19 PM    HDL Cholesterol 41 06/24/2016 03:19 PM    LDL, calculated 63.4 06/24/2016 03:19 PM    VLDL, calculated 30.6 06/24/2016 03:19 PM    Triglyceride 153 06/24/2016 03:19 PM    CHOL/HDL Ratio 3.3 06/24/2016 03:19 PM       Results for orders placed or performed in visit on 04/12/17   AMB POC EKG ROUTINE W/ 12 LEADS, INTER & REP     Status: None    Narrative    Normal sinus rhythm rate 61. Complete right bundle branch block. Low voltage in the precordial leads with poor R-wave progression anterolaterally. Compared to the EKG of March 13, 2017 the right bundle branch block is new. Desiare Angeles D.O., F.A.C.C. [facility]  [facility]  [address] [place] [address].   [address] [address] [facility]    PLEASE NOTE:  This document has been produced using voice recognition software. Unrecognized errors in transcription may be present.

## 2017-04-12 NOTE — PROGRESS NOTES
Review of Systems   Constitutional: Negative. Respiratory: Negative. Cardiovascular: Negative. Gastrointestinal: Negative. Musculoskeletal: Positive for back pain and neck pain. Negative for falls, joint pain and myalgias.

## 2017-04-12 NOTE — MR AVS SNAPSHOT
Visit Information Date & Time Provider Department Dept. Phone Encounter #  
 4/12/2017  3:20 PM [facility] [id number] Follow-up Instructions Return in about 6 months (around 10/12/2017), or if symptoms worsen or fail to improve. Your Appointments 4/26/2017  1:45 PM  
Follow Up with Bea Mora MD  
[facility] Appt Note: 3mo fu; lm to call to res provider out; lm to call to res provider out; 3 MO FU /  Earna Shivers provider out [address] [facility] [address]  
[phone]  
  
   
 [address] [facility],[address] [facility]  
  
    
 7/28/2017  2:00 PM  
Follow Up with Carmella Merritt MD  
[facility]) Appt Note: 4MO  
 [address] [place], [address]  
  
   
 [address] [place], [address] [facility] [address] Upcoming Health Maintenance Date Due DTaP/Tdap/Td series (1 - Tdap) 12/13/1956 Pneumococcal 65+ Low/Medium Risk (2 of 2 - PCV13) 9/7/2017 MEDICARE YEARLY EXAM 3/31/2018 GLAUCOMA SCREENING Q2Y 4/19/2018 Allergies as of 4/12/2017  Review Complete On: 4/12/2017 By: Layo Smith, DO No Known Allergies Current Immunizations  Reviewed on 9/7/2016 Name Date Influenza Vaccine (Quad) PF 9/7/2016, 9/14/2015 Pneumococcal Polysaccharide (PPSV-23) 9/7/2016 Zoster Vaccine, Live 11/28/2016 Not reviewed this visit You Were Diagnosed With   
  
 Codes Comments Atherosclerosis of native coronary artery of native heart without angina pectoris    -  Primary ICD-10-CM: I25.10 ICD-9-CM: 414.01 Angina pectoris ([facility] [address]     ICD-10-CM: I20.9 ICD-9-CM: 413.9 Essential hypertension, benign     ICD-10-CM: I10 
ICD-9-CM: 401.1 Dyslipidemia     ICD-10-CM: E78.5 ICD-9-CM: 272.4 Myocardial infarction, anterior wall (HCC)     ICD-10-CM: I21.09 
ICD-9-CM: 410.10 Vitals BP Pulse Height(growth percentile) Weight(growth percentile) SpO2 BMI  
 128/66 61 5' 11\" (1.803 m) 202 lb (91.6 kg) 98% 28.17 kg/m2 Smoking Status Never Smoker Vitals History BMI and BSA Data Body Mass Index Body Surface Area  
 28.17 kg/m 2 2.14 m 2 Preferred Pharmacy Pharmacy Name Phone [address] [place] [address], [address] [place] [address] [phone] Your Updated Medication List  
  
   
This list is accurate as of: 4/12/17  4:24 PM.  Always use your most recent med list.  
  
  
  
  
 allopurinol 300 mg tablet Commonly known as:  ZYLOPRIM  
TAKE 1 TABLET BY MOUTH ONCE DAILY  
  
 aspirin delayed-release 81 mg tablet Take 81 mg by mouth two (2) times a day. CENTRUM SILVER PO Take 1 Tab by mouth daily. indapamide 1.25 mg tablet Commonly known as:  LOZOL  
TAKE 1 TABLET BY MOUTH ONCE DAILY  
  
 metoprolol tartrate 50 mg tablet Commonly known as:  LOPRESSOR  
TAKE 1 TABLET BY MOUTH 2 TIMES A DAY  
  
 NAMENDA XR 28 mg capsule Generic drug:  memantine ER Take 28 mg by mouth daily. niacin 1,000 mg Tb24 tab Commonly known as:  Thermon Pillar Take 1 tablet by mouth nightly. NITROSTAT 0.4 mg SL tablet Generic drug:  nitroglycerin DISSOLVE 1 TABLET UNDER THE TONGUE EVERY 5 MINUTES AS NEEDED  
  
 oxyCODONE ER 20 mg ER tablet Commonly known as:  OxyCONTIN Take 1 po every 12 hours for chronic pain  
  
 oxyCODONE-acetaminophen  mg per tablet Commonly known as:  PERCOCET 10 Take 1 Tab by mouth every eight (8) hours as needed (for Breakthrough pain). Max Daily Amount: 3 Tabs. potassium chloride 10 mEq tablet Commonly known as:  K-DUR, KLOR-CON  
TAKE 1 TABLET BY MOUTH 2 TIMES A DAY  
  
 simvastatin 40 mg tablet Commonly known as:  ZOCOR  
TAKE 1 TABLET BY MOUTH ONCE DAILY We Performed the Following AMB POC EKG ROUTINE W/ 12 LEADS, INTER & REP [01341 CPT(R)] Follow-up Instructions Return in about 6 months (around 10/12/2017), or if symptoms worsen or fail to improve. Introducing [facility] SERVICES! Bobbi Powell introduces TwentyFour6 patient portal. Now you can access parts of your medical record, email your doctor's office, and request medication refills online. 1. In your internet browser, go to [email or web address] Bronze 2. Click on the First Time User? Click Here link in the Sign In box. You will see the New Member Sign Up page. 3. Enter your TwentyFour6 Access Code exactly as it appears below. You will not need to use this code after youve completed the sign-up process. If you do not sign up before the expiration date, you must request a new code. · TwentyFour6 Access Code: [email or web address] Expires: 7/11/2017  4:24 PM 
 
4. Enter the last four digits of your Social Security Number (xxxx) and Date of Birth (mm/dd/yyyy) as indicated and click Submit. You will be taken to the next sign-up page. 5. Create a TwentyFour6 ID. This will be your TwentyFour6 login ID and cannot be changed, so think of one that is secure and easy to remember. 6. Create a TwentyFour6 password. You can change your password at any time. 7. Enter your Password Reset Question and Answer. This can be used at a later time if you forget your password. 8. Enter your e-mail address. You will receive e-mail notification when new information is available in [address] 9. Click Sign Up. You can now view and download portions of your medical record. 10. Click the Download Summary menu link to download a portable copy of your medical information. If you have questions, please visit the Frequently Asked Questions section of the TwentyFour6 website. Remember, TwentyFour6 is NOT to be used for urgent needs. For medical emergencies, dial 911. Now available from your iPhone and Android! Please provide this summary of care documentation to your next provider. Your primary care clinician is listed as Leonardo Allan. If you have any questions after today's visit, please call [phone].

## 2017-04-26 ENCOUNTER — OFFICE VISIT (OUTPATIENT)
Dept: ORTHOPEDIC SURGERY | Age: 82
End: 2017-04-26

## 2017-04-26 VITALS
RESPIRATION RATE: 12 BRPM | DIASTOLIC BLOOD PRESSURE: 48 MMHG | TEMPERATURE: 98.2 F | SYSTOLIC BLOOD PRESSURE: 126 MMHG | WEIGHT: 205.2 LBS | BODY MASS INDEX: 28.73 KG/M2 | HEIGHT: 71 IN | HEART RATE: 54 BPM

## 2017-04-26 DIAGNOSIS — M48.10 DISH (DIFFUSE IDIOPATHIC SKELETAL HYPEROSTOSIS): Chronic | ICD-10-CM

## 2017-04-26 RX ORDER — OXYCODONE AND ACETAMINOPHEN 10; 325 MG/1; MG/1
1 TABLET ORAL
Qty: 90 TAB | Refills: 0 | Status: SHIPPED | OUTPATIENT
Start: 2017-06-17 | End: 2017-01-01 | Stop reason: SDUPTHER

## 2017-04-26 RX ORDER — OXYCODONE HCL 20 MG/1
20 TABLET, FILM COATED, EXTENDED RELEASE ORAL EVERY 12 HOURS
Qty: 60 TAB | Refills: 0 | Status: SHIPPED | OUTPATIENT
Start: 2017-07-07 | End: 2017-01-01 | Stop reason: SDUPTHER

## 2017-04-26 RX ORDER — OXYCODONE HCL 20 MG/1
20 TABLET, FILM COATED, EXTENDED RELEASE ORAL EVERY 12 HOURS
Qty: 60 TAB | Refills: 0 | Status: SHIPPED | OUTPATIENT
Start: 2017-05-09 | End: 2017-01-01 | Stop reason: SDUPTHER

## 2017-04-26 RX ORDER — OXYCODONE HCL 20 MG/1
TABLET, FILM COATED, EXTENDED RELEASE ORAL
Qty: 60 TAB | Refills: 0 | Status: CANCELLED | OUTPATIENT
Start: 2017-04-26

## 2017-04-26 RX ORDER — OXYCODONE AND ACETAMINOPHEN 10; 325 MG/1; MG/1
1 TABLET ORAL
Qty: 90 TAB | Refills: 0 | Status: SHIPPED | OUTPATIENT
Start: 2017-05-18 | End: 2017-01-01

## 2017-04-26 RX ORDER — OXYCODONE AND ACETAMINOPHEN 10; 325 MG/1; MG/1
1 TABLET ORAL
Qty: 90 TAB | Refills: 0 | Status: SHIPPED | OUTPATIENT
Start: 2017-07-17 | End: 2017-01-01 | Stop reason: SDUPTHER

## 2017-04-26 RX ORDER — OXYCODONE HCL 20 MG/1
20 TABLET, FILM COATED, EXTENDED RELEASE ORAL EVERY 12 HOURS
Qty: 60 TAB | Refills: 0 | Status: SHIPPED | OUTPATIENT
Start: 2017-06-08 | End: 2017-01-01 | Stop reason: SDUPTHER

## 2017-04-26 NOTE — PATIENT INSTRUCTIONS
Learning About Safe Use of Long-Acting Opioids  Introduction  Long-acting opioids relieve moderate to severe long-term pain. They are also called extended-release opioids. Opioids relieve pain by changing the way your body feels pain. They don't cure a health problem. They help you manage the pain. If you take a lot of short-acting pain medicine, your doctor may give you long-acting opioids. They help you avoid the ups and downs in pain relief that you may have with short-acting medicine. Opioids are powerful. When taken on schedule and as your doctor prescribes, they work well and are safe. But even with proper use, opioids can cause tolerance and overdose, physical dependence, addiction, or death. Examples  · Fentanyl patch (Duragesic)  · Methadone (Dolophine)  · Morphine (Carol)  · Oxycodone controlled-release (OxyContin)  Safety tips  To avoid taking too much (overdose): · Be safe with medicines. Take your medicines exactly as prescribed. Do not take extra doses. Even one extra dose can be dangerous. Taking too much of these medicines can cause death. · Call your doctor if you miss a dose of your medicine and aren't sure what to do. Do not double your dose. · Do not break, crush, or chew a pill. Do not cut or tear a patch. To use these medicines safely:  · Call your doctor if you think you are having a problem with your medicine. You will get more details on the specific medicines your doctor prescribes. · Do not drink alcohol or take illegal drugs. · Do not drive or operate machinery until you can think clearly. Opioids may affect your judgment and decision making. Talk with your doctor about when it is safe to drive. · Keep your medicine in a safe and secure place. Keep it away from children and pets. · Check with your doctor or pharmacist before you use any other medicines. This includes over-the-counter medicines.   ¨ Make sure your doctor knows all of the medicines, vitamins, herbal products, and supplements you take. ¨ Do not take opioids with other medicines that make you sleepy or relaxed. Taking both can be dangerous. · Talk to your doctor about a naloxone rescue kit. A kit can help you, and even save your life, if you take too much of an opioid. Possible side effects  All medicines have side effects. But many people don't feel the side effects, or they are able to deal with them. You may:  · Feel confused or have a hard time thinking clearly. · Be constipated. · Feel faint, dizzy, or lightheaded. · Feel drowsy. · Feel sick to your stomach or vomit. · Have an allergic reaction. What to know about taking this medicine  · When you take an opioid regularly, your body gets used to it. This can lead to tolerance and physical dependence. These are not the same as addiction. ¨ Tolerance means that, over time, you may need to take more of the drug to keep getting the same amount of pain relief. The danger is that tolerance greatly increases your risk of overdose, breathing emergencies, and death. ¨ If you are physically dependent on an opioid, you may have withdrawal symptoms when you stop taking it. These include nausea, sweating, chills, diarrhea, and shaking. You can avoid these symptoms if you gradually stop taking the opioid over a set period of time. Your doctor can help you. ¨ Addiction is a chronic illness that makes you crave a substance, such as a drug or alcohol. When you are addicted to a substance, you have a hard time stopping yourself from using it even when you can see it causes harm. · You have a small risk of addiction when you take opioids. Your risk is greater if you have a history of substance use problems. Others who are more at risk for addiction are teenagers, older adults, people who have depression, and those who take high doses of medicine.   · Ask for written instructions from your doctor or pharmacist about how to safely get rid of any medicine that's left over.  · Call your doctor if the dose you are taking doesn't control your pain. Where can you learn more? Go to http://pablo-isrrael.info/. Enter O105 in the search box to learn more about \"Learning About Safe Use of Long-Acting Opioids. \"  Current as of: November 15, 2016  Content Version: 11.2  © 7368-2280 [employer]. Care instructions adapted under license by [employer] (which disclaims liability or warranty for this information). If you have questions about a medical condition or this instruction, always ask your healthcare professional. [place] [address] any warranty or liability for your use of this information.

## 2017-04-26 NOTE — MR AVS SNAPSHOT
Visit Information Date & Time Provider Department Dept. Phone Encounter #  
 4/26/2017  1:45 PM Bea Mora  [address] [facility] [phone] [id number] Follow-up Instructions Return in about 3 months (around 7/26/2017) for Controlled substance med f/u. Your Appointments 7/28/2017  2:00 PM  
Follow Up with America Laguna MD  
[address] [place] [address] [address] [facility]) Appt Note: 4MO  
 [address], [address] [place] [address]  
[phone]  
  
   
 [address] [place], [address] [facility] [address]  
  
    
 11/6/2017  3:20 PM  
Follow Up with Floridalma Lozada DO [facility] ([address]) Appt Note: 6 mth f/up Michel Chun [id number]  
[phone] [address] [facility] [address]  [address] Upcoming Health Maintenance Date Due DTaP/Tdap/Td series (1 - Tdap) 12/13/1956 Pneumococcal 65+ Low/Medium Risk (2 of 2 - PCV13) 9/7/2017 MEDICARE YEARLY EXAM 3/31/2018 GLAUCOMA SCREENING Q2Y 4/19/2018 Allergies as of 4/26/2017  Review Complete On: 4/26/2017 By: Low Brambila LPN No Known Allergies Current Immunizations  Reviewed on 9/7/2016 Name Date Influenza Vaccine (Quad) PF 9/7/2016, 9/14/2015 Pneumococcal Polysaccharide (PPSV-23) 9/7/2016 Zoster Vaccine, Live 11/28/2016 Not reviewed this visit You Were Diagnosed With   
  
 Codes Comments DISH (diffuse idiopathic skeletal hyperostosis)     ICD-10-CM: M48.10 ICD-9-CM: 721.6 Vitals BP Pulse Temp Resp Height(growth percentile) Weight(growth percentile) 126/48 (!) 54 98.2 °F (36.8 °C) (Oral) 12 5' 11\" (1.803 m) 205 lb 3.2 oz (93.1 kg) BMI Smoking Status 28.62 kg/m2 Never Smoker BMI and BSA Data Body Mass Index Body Surface Area  
 28.62 kg/m 2 2.16 m 2 Preferred Pharmacy Pharmacy Name Phone [address], [address] [facility] [phone] Your Updated Medication List  
  
   
This list is accurate as of: 4/26/17  2:56 PM.  Always use your most recent med list.  
  
  
  
  
 allopurinol 300 mg tablet Commonly known as:  ZYLOPRIM  
TAKE 1 TABLET BY MOUTH ONCE DAILY  
  
 aspirin delayed-release 81 mg tablet Take 81 mg by mouth two (2) times a day. CENTRUM SILVER PO Take 1 Tab by mouth daily. indapamide 1.25 mg tablet Commonly known as:  LOZOL  
TAKE 1 TABLET BY MOUTH ONCE DAILY  
  
 metoprolol tartrate 50 mg tablet Commonly known as:  LOPRESSOR  
TAKE 1 TABLET BY MOUTH 2 TIMES A DAY  
  
 NAMENDA XR 28 mg capsule Generic drug:  memantine ER Take 28 mg by mouth daily. niacin 1,000 mg Tb24 tab Commonly known as:  Dellie Pont Take 1 tablet by mouth nightly. NITROSTAT 0.4 mg SL tablet Generic drug:  nitroglycerin DISSOLVE 1 TABLET UNDER THE TONGUE EVERY 5 MINUTES AS NEEDED  
  
 * oxyCODONE ER 20 mg ER tablet Commonly known as:  OxyCONTIN Take 1 Tab by mouth every twelve (12) hours. Max Daily Amount: 40 mg. Indications: Chronic Pain Start taking on:  5/9/2017 * oxyCODONE ER 20 mg ER tablet Commonly known as:  OxyCONTIN Take 1 Tab by mouth every twelve (12) hours. Max Daily Amount: 40 mg. Indications: Chronic Pain Start taking on:  6/8/2017 * oxyCODONE ER 20 mg ER tablet Commonly known as:  OxyCONTIN Take 1 Tab by mouth every twelve (12) hours. Max Daily Amount: 40 mg. Indications: Chronic Pain Start taking on:  7/7/2017 * oxyCODONE-acetaminophen  mg per tablet Commonly known as:  PERCOCET 10 Take 1 Tab by mouth every eight (8) hours as needed (for Breakthrough pain). Max Daily Amount: 3 Tabs. Indications: Pain Start taking on:  5/18/2017 * oxyCODONE-acetaminophen  mg per tablet Commonly known as:  PERCOCET 10  
 Take 1 Tab by mouth every eight (8) hours as needed for Pain. Max Daily Amount: 3 Tabs. (For breakthrough pain)  Indications: Pain Start taking on:  6/17/2017 * oxyCODONE-acetaminophen  mg per tablet Commonly known as:  PERCOCET 10 Take 1 Tab by mouth every eight (8) hours as needed for Pain. Max Daily Amount: 3 Tabs. (Breakthrough Pain)  Indications: Pain Start taking on:  7/17/2017 potassium chloride 10 mEq tablet Commonly known as:  K-DUR, KLOR-CON  
TAKE 1 TABLET BY MOUTH 2 TIMES A DAY  
  
 simvastatin 40 mg tablet Commonly known as:  ZOCOR  
TAKE 1 TABLET BY MOUTH ONCE DAILY * Notice: This list has 6 medication(s) that are the same as other medications prescribed for you. Read the directions carefully, and ask your doctor or other care provider to review them with you. Prescriptions Printed Refills  
 oxyCODONE-acetaminophen (PERCOCET 10)  mg per tablet 0 Starting on: 5/18/2017 Sig: Take 1 Tab by mouth every eight (8) hours as needed (for Breakthrough pain). Max Daily Amount: 3 Tabs. Indications: Pain Class: Print Route: Oral  
 oxyCODONE-acetaminophen (PERCOCET 10)  mg per tablet 0 Starting on: 6/17/2017 Sig: Take 1 Tab by mouth every eight (8) hours as needed for Pain. Max Daily Amount: 3 Tabs. (For breakthrough pain)  Indications: Pain Class: Print Route: Oral  
 oxyCODONE-acetaminophen (PERCOCET 10)  mg per tablet 0 Starting on: 7/17/2017 Sig: Take 1 Tab by mouth every eight (8) hours as needed for Pain. Max Daily Amount: 3 Tabs. (Breakthrough Pain)  Indications: Pain Class: Print Route: Oral  
 oxyCODONE ER (OXYCONTIN) 20 mg ER tablet 0 Starting on: 5/9/2017 Sig: Take 1 Tab by mouth every twelve (12) hours. Max Daily Amount: 40 mg. Indications: Chronic Pain Class: Print Route: Oral  
 oxyCODONE ER (OXYCONTIN) 20 mg ER tablet 0 Starting on: 6/8/2017 Sig: Take 1 Tab by mouth every twelve (12) hours. Max Daily Amount: 40 mg. Indications: Chronic Pain Class: Print Route: Oral  
 oxyCODONE ER (OXYCONTIN) 20 mg ER tablet 0 Starting on: 7/7/2017 Sig: Take 1 Tab by mouth every twelve (12) hours. Max Daily Amount: 40 mg. Indications: Chronic Pain Class: Print Route: Oral  
  
Follow-up Instructions Return in about 3 months (around 7/26/2017) for Controlled substance med f/u. Patient Instructions Learning About Safe Use of Long-Acting Opioids Introduction Long-acting opioids relieve moderate to severe long-term pain. They are also called extended-release opioids. Opioids relieve pain by changing the way your body feels pain. They don't cure a health problem. They help you manage the pain. If you take a lot of short-acting pain medicine, your doctor may give you long-acting opioids. They help you avoid the ups and downs in pain relief that you may have with short-acting medicine. Opioids are powerful. When taken on schedule and as your doctor prescribes, they work well and are safe. But even with proper use, opioids can cause tolerance and overdose, physical dependence, addiction, or death. Examples · Fentanyl patch (Duragesic) · Methadone (Dolophine) · Morphine (Carol) · Oxycodone controlled-release (OxyContin) Safety tips To avoid taking too much (overdose): · Be safe with medicines. Take your medicines exactly as prescribed. Do not take extra doses. Even one extra dose can be dangerous. Taking too much of these medicines can cause death. · Call your doctor if you miss a dose of your medicine and aren't sure what to do. Do not double your dose. · Do not break, crush, or chew a pill. Do not cut or tear a patch. To use these medicines safely: · Call your doctor if you think you are having a problem with your medicine. You will get more details on the specific medicines your doctor prescribes. · Do not drink alcohol or take illegal drugs. · Do not drive or operate machinery until you can think clearly. Opioids may affect your judgment and decision making. Talk with your doctor about when it is safe to drive. · Keep your medicine in a safe and secure place. Keep it away from children and pets. · Check with your doctor or pharmacist before you use any other medicines. This includes over-the-counter medicines. ¨ Make sure your doctor knows all of the medicines, vitamins, herbal products, and supplements you take. ¨ Do not take opioids with other medicines that make you sleepy or relaxed. Taking both can be dangerous. · Talk to your doctor about a naloxone rescue kit. A kit can help you, and even save your life, if you take too much of an opioid. Possible side effects All medicines have side effects. But many people don't feel the side effects, or they are able to deal with them. You may: · Feel confused or have a hard time thinking clearly. · Be constipated. · Feel faint, dizzy, or lightheaded. · Feel drowsy. · Feel sick to your stomach or vomit. · Have an allergic reaction. What to know about taking this medicine · When you take an opioid regularly, your body gets used to it. This can lead to tolerance and physical dependence. These are not the same as addiction. ¨ Tolerance means that, over time, you may need to take more of the drug to keep getting the same amount of pain relief. The danger is that tolerance greatly increases your risk of overdose, breathing emergencies, and death. ¨ If you are physically dependent on an opioid, you may have withdrawal symptoms when you stop taking it. These include nausea, sweating, chills, diarrhea, and shaking. You can avoid these symptoms if you gradually stop taking the opioid over a set period of time. Your doctor can help you.  
¨ Addiction is a chronic illness that makes you crave a substance, such as a drug or alcohol. When you are addicted to a substance, you have a hard time stopping yourself from using it even when you can see it causes harm. · You have a small risk of addiction when you take opioids. Your risk is greater if you have a history of substance use problems. Others who are more at risk for addiction are teenagers, older adults, people who have depression, and those who take high doses of medicine. · Ask for written instructions from your doctor or pharmacist about how to safely get rid of any medicine that's left over. · Call your doctor if the dose you are taking doesn't control your pain. Where can you learn more? Go to http://pablo-isrrael.info/. Enter O105 in the search box to learn more about \"Learning About Safe Use of Long-Acting Opioids. \" Current as of: November 15, 2016 Content Version: 11.2 © 8152-1233 [employer]. Care instructions adapted under license by [employer] (which disclaims liability or warranty for this information). If you have questions about a medical condition or this instruction, always ask your healthcare professional. [address] any warranty or liability for your use of this information. Introducing [facility] SERVICES! Petar Esquivel introduces OBOOK patient portal. Now you can access parts of your medical record, email your doctor's office, and request medication refills online. 1. In your internet browser, go to https://[employer]. [employer] 2. Click on the First Time User? Click Here link in the Sign In box. You will see the New Member Sign Up page. 3. Enter your OBOOK Access Code exactly as it appears below. You will not need to use this code after youve completed the sign-up process. If you do not sign up before the expiration date, you must request a new code. · OBOOK Access Code: [email or web address] Expires: 7/11/2017  4:24 PM 
 
 4. Enter the last four digits of your Social Security Number (xxxx) and Date of Birth (mm/dd/yyyy) as indicated and click Submit. You will be taken to the next sign-up page. 5. Create a [employer] ID. This will be your [employer] login ID and cannot be changed, so think of one that is secure and easy to remember. 6. Create a [employer] password. You can change your password at any time. 7. Enter your Password Reset Question and Answer. This can be used at a later time if you forget your password. 8. Enter your e-mail address. You will receive e-mail notification when new information is available in [address] 9. Click Sign Up. You can now view and download portions of your medical record. 10. Click the Download Summary menu link to download a portable copy of your medical information. If you have questions, please visit the Frequently Asked Questions section of the [employer] website. Remember, [employer] is NOT to be used for urgent needs. For medical emergencies, dial 911. Now available from your iPhone and Android! Please provide this summary of care documentation to your next provider. Your primary care clinician is listed as Rafael Amin. If you have any questions after today's visit, please call [phone].

## 2017-04-26 NOTE — PROGRESS NOTES
[address]  [address], [address]  [facility], [address]  Phone: [phone]  Fax: [phone]        Yuridia Mosqueda  : 1935  PCP: Quintin Melendrez MD    PROGRESS NOTE      ASSESSMENT AND PLAN    Giovany Viera was seen today for back pain and follow-up. Diagnoses and all orders for this visit:    DISH (diffuse idiopathic skeletal hyperostosis)  -     oxyCODONE-acetaminophen (PERCOCET 10)  mg per tablet; Take 1 Tab by mouth every eight (8) hours as needed (for Breakthrough pain). Max Daily Amount: 3 Tabs. Indications: Pain  -     oxyCODONE-acetaminophen (PERCOCET 10)  mg per tablet; Take 1 Tab by mouth every eight (8) hours as needed for Pain. Max Daily Amount: 3 Tabs. (For breakthrough pain)  Indications: Pain  -     oxyCODONE-acetaminophen (PERCOCET 10)  mg per tablet; Take 1 Tab by mouth every eight (8) hours as needed for Pain. Max Daily Amount: 3 Tabs. (Breakthrough Pain)  Indications: Pain  -     oxyCODONE ER (OXYCONTIN) 20 mg ER tablet; Take 1 Tab by mouth every twelve (12) hours. Max Daily Amount: 40 mg. Indications: Chronic Pain  -     oxyCODONE ER (OXYCONTIN) 20 mg ER tablet; Take 1 Tab by mouth every twelve (12) hours. Max Daily Amount: 40 mg. Indications: Chronic Pain  -     oxyCODONE ER (OXYCONTIN) 20 mg ER tablet; Take 1 Tab by mouth every twelve (12) hours. Max Daily Amount: 40 mg. Indications: Chronic Pain    Other orders  -     Cancel: oxyCODONE ER (OXYCONTIN) 20 mg ER tablet; Take 1 po every 12 hours for chronic pain    1. Continue Oxycontin and Percocet. 2. Discussed risk of overdose and Narcan. 3. Given information on opioid medications. Follow-up Disposition:  Return in about 3 months (around 2017) for Controlled substance med f/u. Risks and benefits of ongoing opiate therapy have been reviewed with the patient.  is appropriate. UDS results have been consistent. No pain behaviors.  Pt has a good risk to benefit ratio which allows the pt to function in a home environment without side effects. HISTORY OF PRESENT ILLNESS  Suze Silverio. is a 80 y.o. male. Pt presents to the office for a f/u visit for neck and back pain. Pt is on Oxycontin and Percocet which his wife manages. Pt continues to have neck and back pain. Today is a bad day for his pain. He has stiffness in his neck. His back pain bothers him constantly throughout the day. Pt and his wife had a problem picking up his prescriptions when I was out of the country. Pt has difficulty with rising from a seated position. He denies any saddle paresthesia. Pt takes Percocet BID-TID for BTP. Pt takes Oxycontin Q12 with benefit. He has been experiencing some constipation. Pt denies any dizziness, confusion, uncontrolled constipation, and cravings due to controlled substances. Denies persistent fevers, chills, weight changes, neurogenic bowel or bladder symptoms. Pt denies recent ED visits or hospitalizations. PMHx of DISH and cardiac stents. Pt used to be very good at golf. He has some slurred speech and memory loss in the last few months due to his stroke. Pt notes that his speech and memory has been getting better. He was given pills to aid in his memory. Pt has a PMHx of dementia. Pain Assessment  4/26/2017   Location of Pain Back   Severity of Pain 7   Quality of Pain Aching; Sharp   Quality of Pain Comment stabbing   Duration of Pain Persistent   Frequency of Pain Constant   Aggravating Factors Other (Comment)   Aggravating Factors Comment pt denies any aggravating factors   Limiting Behavior Some   Relieving Factors Nothing   Relieving Factors Comment -   Result of Injury No       PAST MEDICAL HISTORY   Past Medical History:   Diagnosis Date    3-vessel coronary artery disease 02/13/2004    Tech limited. Low normal to mildly depressed LV systolic function. LVH. DDfx. LAE. PASP 25-30.    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 mLAD 100%. CX diffuse nonobstructive. OM1 patent stent. pRCA 100%. Dbl seq SVG to D1, OM1 100%. SVG to pRPDA 99% (3.5 x 23-mm Xience stent). Patent mid segment stent. LIMA to LAD ok.  Cardiac echocardiogram 11/16/2014    [facility]:  EF 55-60%. DDfx. Normal RVSP. Mild TR. Neg bubble study for atrial shunt. No significant valvular pathology.  Cardiac nuclear imaging test, high risk 06/15/2012    Lg area of mod mid to distal anterior ischemia. Lg area of inferior ischemia. EF 57%. Mild mid-distal anterior, inferior hypk.   High risk pharm stress test.    Cervical spine pain     Deviated septum     s/p corrective surgery 7/1/2010    Disc disease, degenerative, cervical     joint pain, back pain    Dysphagia, unspecified     Essential hypertension, benign     Gout with other specified manifestations     Head injury     frequent headaches    Hypercholesterolemia     Insomnia, unspecified     Lumbar canal stenosis     Meniere's disease     Myocardial infarction, anterior wall (HCC)     DIEGO (obstructive sleep apnea)     on CPAP    S/P diskectomy 7/5/07    cervical    Spinal stenosis, lumbar region, without neurogenic claudication     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     Unspecified sinusitis (chronic)     Ventral hernia, unspecified, without mention of obstruction or gangrene     S/p repair 6/2003       Past Surgical History:   Procedure Laterality Date    CARDIAC CATHETERIZATION  3/15/2017         CARDIAC SURG PROCEDURE UNLIST  1/04    angioplasty of a proximal left anterior descending    CARDIAC SURG PROCEDURE UNLIST  6/2/08    angioplasty and stenting of totally obstructed saphenous vein graft to the posterior descending artery    CORONARY ARTERY ANGIOGRAM 3/15/2017         HX APPENDECTOMY      HX CATARACT REMOVAL      bilateral    HX CERVICAL DISKECTOMY  7/07    and fusion    HX CORONARY ARTERY BYPASS GRAFT  4/14/94    X4, with LIMA to the LAD, double sequential to the diagonal and circumflex and single to the main right coronary artery    HX CORONARY STENT PLACEMENT  6/20/2012    HX GI  1992    two feet of colon removed    HX HEART CATHETERIZATION  6/02/08    HX HEART CATHETERIZATION  6/20/2012    HX HEENT  7/1/10    deviated nasal septum surgery    HX HERNIA REPAIR  7/2003    two prior hernia repairs in the past    HX OTHER SURGICAL      HX PTCA  6/20/2012    HX TONSILLECTOMY      as a child    [facility] [address] [facility]   . MEDICATIONS    Current Outpatient Prescriptions   Medication Sig Dispense Refill    potassium chloride (K-DUR, KLOR-CON) 10 mEq tablet TAKE 1 TABLET BY MOUTH 2 TIMES A DAY 60 Tab 1    oxyCODONE-acetaminophen (PERCOCET 10)  mg per tablet Take 1 Tab by mouth every eight (8) hours as needed (for Breakthrough pain). Max Daily Amount: 3 Tabs. 90 Tab 0    oxyCODONE ER (OXYCONTIN) 20 mg ER tablet Take 1 po every 12 hours for chronic pain 60 Tab 0    allopurinol (ZYLOPRIM) 300 mg tablet TAKE 1 TABLET BY MOUTH ONCE DAILY 90 Tab 3    aspirin delayed-release 81 mg tablet Take 81 mg by mouth two (2) times a day.  indapamide (LOZOL) 1.25 mg tablet TAKE 1 TABLET BY MOUTH ONCE DAILY 30 Tab 2    simvastatin (ZOCOR) 40 mg tablet TAKE 1 TABLET BY MOUTH ONCE DAILY 30 Tab 6    NAMENDA XR 28 mg capsule Take 28 mg by mouth daily. 1    metoprolol tartrate (LOPRESSOR) 50 mg tablet TAKE 1 TABLET BY MOUTH 2 TIMES A DAY 60 Tab 6    NITROSTAT 0.4 mg SL tablet DISSOLVE 1 TABLET UNDER THE TONGUE EVERY 5 MINUTES AS NEEDED 25 Tab 0    niacin (NIASPAN) 1,000 mg Tb24 tab Take 1 tablet by mouth nightly. 30 tablet 11    MULTIVITAMINS W-MINERALS/LUT (CENTRUM SILVER PO) Take 1 Tab by mouth daily. ALLERGIES  No Known Allergies       SOCIAL HISTORY    Social History     Social History    Marital status:      Spouse name: N/A    Number of children: N/A    Years of education: N/A     Occupational History    Not on file. Social History Main Topics    Smoking status: Never Smoker    Smokeless tobacco: Never Used    Alcohol use Yes    Drug use: No    Sexual activity: No     Other Topics Concern    Not on file     Social History Narrative       FAMILY HISTORY  Family History   Problem Relation Age of Onset    Heart Disease Father     Stroke Father        REVIEW OF SYSTEMS  Review of Systems   Constitutional: Negative for chills, fever and weight loss. Respiratory: Negative for shortness of breath. Cardiovascular: Negative for chest pain. Gastrointestinal: Negative for constipation. Negative for fecal incontinence   Genitourinary: Negative for dysuria. Negative for urinary incontinence   Musculoskeletal:        Per HPI   Skin: Negative for rash. Neurological: Negative for dizziness, tingling, tremors, focal weakness and headaches. Endo/Heme/Allergies: Does not bruise/bleed easily. Psychiatric/Behavioral: The patient does not have insomnia. PHYSICAL EXAMINATION  Visit Vitals    /48    Pulse (!) 54    Temp 98.2 °F (36.8 °C) (Oral)    Resp 12    Ht 5' 11\" (1.803 m)    Wt 205 lb 3.2 oz (93.1 kg)    BMI 28.62 kg/m2         Accompanied by spouse. Constitutional:  Well developed, well nourished, in no acute distress. Psychiatric: Affect and mood are appropriate. Masked facies. No tremor. Integumentary: No rashes or abrasions noted on exposed areas. Cardiovascular/Peripheral Vascular: Intact l pulses. No peripheral edema is noted. Lymphatic:  No evidence of lymphedema. No cervical lymphadenopathy. SPINE/MUSCULOSKELETAL EXAM    Cervical spine:  Neck is midline. Increased muscle tone p/spinals. No focal atrophy is noted.  Limited ROM Negative Spurling's sign. Negative Tinel's sign. Negative Ramirez's sign. Sensation grossly intact to light touch. Lumbar spine:  No rash, ecchymosis, or gross obliquity. No fasciculations. Increase muscle tone p/spinals. No focal atrophy is noted. No tenderness to palpation at the sciatic notch. SI joints non-tender. Trochanters non tender. Sensation grossly intact to light touch. MOTOR:      Biceps  Triceps Deltoids Wrist Ext Wrist Flex Hand Intrin   Right +4/5 +4/5 +4/5 +4/5 +4/5 +4/5   Left +4/5 +4/5 +4/5 +4/5 +4/5 +4/5        Hip Flex  Quads Hamstrings Ankle DF EHL Ankle PF   Right +4/5 +4/5 +4/5 +4/5 +4/5 +4/5   Left +4/5 +4/5 +4/5 +4/5 +4/5 +4/5     Ambulation without assistive device. FWB. No hyper reflexia. Written by [facility], as dictated by Vivi Lobo MD.    I, Dr. Vivi Lobo MD, confirm that all documentation is accurate. Mr. Kasey Goddard may have a reminder for a \"due or due soon\" health maintenance. I have asked that he contact his primary care provider for follow-up on this health maintenance.

## 2017-06-02 RX ORDER — INDAPAMIDE 1.25 MG/1
TABLET, FILM COATED ORAL
Qty: 30 TAB | Refills: 2 | Status: SHIPPED | OUTPATIENT
Start: 2017-06-02 | End: 2017-01-01

## 2017-06-09 RX ORDER — POTASSIUM CHLORIDE 750 MG/1
TABLET, EXTENDED RELEASE ORAL
Qty: 60 TAB | Refills: 1 | Status: SHIPPED | OUTPATIENT
Start: 2017-06-09 | End: 2017-01-01 | Stop reason: SDUPTHER

## 2017-06-25 RX ORDER — SIMVASTATIN 40 MG/1
TABLET, FILM COATED ORAL
Qty: 30 TAB | Refills: 6 | Status: SHIPPED | OUTPATIENT
Start: 2017-06-25 | End: 2017-01-01 | Stop reason: ALTCHOICE

## 2017-07-18 ENCOUNTER — HOSPITAL ENCOUNTER (OUTPATIENT)
Dept: LAB | Age: 82
Discharge: HOME | End: 2017-07-18
Payer: MEDICARE

## 2017-07-18 PROCEDURE — 87070 CULTURE OTHR SPECIMN AEROBIC: CPT | Performed by: PLASTIC SURGERY

## 2017-07-18 PROCEDURE — 87186 SC STD MICRODIL/AGAR DIL: CPT | Performed by: PLASTIC SURGERY

## 2017-07-18 PROCEDURE — 87077 CULTURE AEROBIC IDENTIFY: CPT | Performed by: PLASTIC SURGERY

## 2017-07-21 LAB
BACTERIA SPEC CULT: ABNORMAL
BACTERIA SPEC CULT: ABNORMAL
GRAM STN SPEC: ABNORMAL
GRAM STN SPEC: ABNORMAL
SERVICE CMNT-IMP: ABNORMAL

## 2017-07-28 NOTE — PROGRESS NOTES
1. Have you been to the ER, urgent care clinic since your last visit? Hospitalized since your last visit? No    2. Have you seen or consulted any other health care providers outside of the [address] [place] [address] since your last visit? Include any pap smears or colon screening.  Dr. Zaid Hector - surgery on [facility] on face

## 2017-07-28 NOTE — PROGRESS NOTES
The patient presents to the office today with the chief complaint of hypertension    HPI    The patient remains on medications for hypertension. he is tolerating the medications well. The patient has coronary artery disease. He denies chest pain. He has mild dyspnea on exertion. The patient remains on medications for hperlipidemia   He is tolerating the statin well. The patient has continued complaints of severe low back stiffness and low back pain. The patient's wife has noticed decreasing memory. This has been a problem for the past six months. The patient now has decreasing appetite. He is \"just not hungry\". This has been going on for the past 4 months. Review of Systems   Respiratory: Negative for shortness of breath. Cardiovascular: Negative for chest pain and leg swelling. Musculoskeletal: Positive for back pain. No Known Allergies    Current Outpatient Prescriptions   Medication Sig Dispense Refill    donepezil (ARICEPT) 10 mg tablet Take 10 mg by mouth nightly.  mirtazapine (REMERON SOL-TAB) 15 mg disintegrating tablet 1 tablet at bedtime 30 Tab 3    simvastatin (ZOCOR) 40 mg tablet TAKE 1 TABLET BY MOUTH ONCE DAILY 30 Tab 6    potassium chloride (KLOR-CON) 10 mEq tablet TAKE 1 TABLET BY MOUTH 2 TIMES A DAY 60 Tab 1    oxyCODONE-acetaminophen (PERCOCET 10)  mg per tablet Take 1 Tab by mouth every eight (8) hours as needed (for Breakthrough pain). Max Daily Amount: 3 Tabs. Indications: Pain 90 Tab 0    allopurinol (ZYLOPRIM) 300 mg tablet TAKE 1 TABLET BY MOUTH ONCE DAILY 90 Tab 3    aspirin delayed-release 81 mg tablet Take 81 mg by mouth two (2) times a day.  NAMENDA XR 28 mg capsule Take 28 mg by mouth daily.   1    metoprolol tartrate (LOPRESSOR) 50 mg tablet TAKE 1 TABLET BY MOUTH 2 TIMES A DAY 60 Tab 6    NITROSTAT 0.4 mg SL tablet DISSOLVE 1 TABLET UNDER THE TONGUE EVERY 5 MINUTES AS NEEDED 25 Tab 0    niacin (NIASPAN) 1,000 mg Tb24 tab Take 1 tablet by mouth nightly. 30 tablet 11    MULTIVITAMINS W-MINERALS/LUT (CENTRUM SILVER PO) Take 1 Tab by mouth daily.  oxyCODONE ER (OXYCONTIN) 20 mg ER tablet Take 1 Tab by mouth every twelve (12) hours. Max Daily Amount: 40 mg. Indications: Chronic Pain 60 Tab 0    naloxone (NARCAN) 4 mg/actuation spry Use 1 spray intranasally into 1 nostril. 1 Box 0    indapamide (LOZOL) 1.25 mg tablet TAKE 1 TABLET BY MOUTH ONCE DAILY 30 Tab 2       Past Medical History:   Diagnosis Date    3-vessel coronary artery disease 02/13/2004    Tech limited. Low normal to mildly depressed LV systolic function. LVH. DDfx. LAE. PASP 25-30. 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 mLAD 100%. CX diffuse nonobstructive. OM1 patent stent. pRCA 100%. Dbl seq SVG to D1, OM1 100%. SVG to pRPDA 99% (3.5 x 23-mm Xience stent). Patent mid segment stent. LIMA to LAD ok.  Cardiac echocardiogram 11/16/2014    [facility]:  EF 55-60%. DDfx. Normal RVSP. Mild TR. Neg bubble study for atrial shunt. No significant valvular pathology.  Cardiac nuclear imaging test, high risk 06/15/2012    Lg area of mod mid to distal anterior ischemia. Lg area of inferior ischemia. EF 57%. Mild mid-distal anterior, inferior hypk.   High risk pharm stress test.    Cervical spine pain     Deviated septum     s/p corrective surgery 7/1/2010    Disc disease, degenerative, cervical     joint pain, back pain    Dysphagia, unspecified     Essential hypertension, benign     Gout with other specified manifestations     Head injury     frequent headaches    Hypercholesterolemia     Insomnia, unspecified     Lumbar canal stenosis     Meniere's disease     Myocardial infarction, anterior wall (HCC)     DIEGO (obstructive sleep apnea)     on CPAP    S/P diskectomy 7/5/07    cervical    Spinal stenosis, lumbar region, without neurogenic claudication     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     Unspecified sinusitis (chronic)     Ventral hernia, unspecified, without mention of obstruction or gangrene     S/p repair 6/2003       Past Surgical History:   Procedure Laterality Date    CARDIAC CATHETERIZATION  3/15/2017         CARDIAC SURG PROCEDURE UNLIST  1/04    angioplasty of a proximal left anterior descending    CARDIAC SURG PROCEDURE UNLIST  6/2/08    angioplasty and stenting of totally obstructed saphenous vein graft to the posterior descending artery    CORONARY ARTERY ANGIOGRAM  3/15/2017         HX APPENDECTOMY      HX CATARACT REMOVAL      bilateral    HX CERVICAL DISKECTOMY  7/07    and fusion    HX CORONARY ARTERY BYPASS GRAFT  4/14/94    X4, with LIMA to the LAD, double sequential to the diagonal and circumflex and single to the main right coronary artery    HX CORONARY STENT PLACEMENT  6/20/2012    HX GI  1992    two feet of colon removed    HX HEART CATHETERIZATION  6/02/08    HX HEART CATHETERIZATION  6/20/2012    HX HEENT  7/1/10    deviated nasal septum surgery    HX HERNIA REPAIR  7/2003    two prior hernia repairs in the past    HX OTHER SURGICAL      HX PTCA  6/20/2012    HX TONSILLECTOMY      as a child    [place] [address] [facility] [address]      for crushed disk       Social History     Social History    Marital status:      Spouse name: N/A    Number of children: N/A    Years of education: N/A     Occupational History    Not on file.      Social History Main Topics    Smoking status: Never Smoker    Smokeless tobacco: Never Used    Alcohol use Yes    Drug use: No    Sexual activity: No     Other Topics Concern    Not on file     Social History Narrative       Patient does not have an advanced directive on file    Visit Vitals    /54 (BP 1 Location: Left arm, BP Patient Position: Sitting)    Pulse (!) 48    Temp 97.6 °F (36.4 °C) (Tympanic)    Resp 20    Ht 5' 11\" (1.803 m)    Wt 188 lb (85.3 kg)    SpO2 98%    BMI 26.22 kg/m2       Physical Exam   Neck: Carotid bruit is not present. Cardiovascular: Normal rate and regular rhythm. Exam reveals no gallop. No murmur heard. Pulmonary/Chest: He has no wheezes. He has no rales. Abdominal: Soft. He exhibits no distension. There is no tenderness. Musculoskeletal:   Lumbar spine is nearly frozen       BMI:  [facility] Outpatient Visit on 07/28/2017   Component Date Value Ref Range Status    WBC 07/28/2017 7.0  4.6 - 13.2 K/uL Final    RBC 07/28/2017 4.37* 4.70 - 5.50 M/uL Final    HGB 07/28/2017 13.5  13.0 - 16.0 g/dL Final    HCT 07/28/2017 41.3  36.0 - 48.0 % Final    MCV 07/28/2017 94.5  74.0 - 97.0 FL Final    MCH 07/28/2017 30.9  24.0 - 34.0 PG Final    MCHC 07/28/2017 32.7  31.0 - 37.0 g/dL Final    RDW 07/28/2017 13.6  11.6 - 14.5 % Final    PLATELET 57/15/6606 492  135 - 420 K/uL Final    MPV 07/28/2017 11.1  9.2 - 11.8 FL Final    NEUTROPHILS 07/28/2017 51  40 - 73 % Final    LYMPHOCYTES 07/28/2017 35  21 - 52 % Final    MONOCYTES 07/28/2017 8  3 - 10 % Final    EOSINOPHILS 07/28/2017 6* 0 - 5 % Final    BASOPHILS 07/28/2017 0  0 - 2 % Final    ABS. NEUTROPHILS 07/28/2017 3.5  1.8 - 8.0 K/UL Final    ABS. LYMPHOCYTES 07/28/2017 2.4  0.9 - 3.6 K/UL Final    ABS. MONOCYTES 07/28/2017 0.6  0.05 - 1.2 K/UL Final    ABS. EOSINOPHILS 07/28/2017 0.4  0.0 - 0.4 K/UL Final    ABS.  BASOPHILS 07/28/2017 0.0  0.0 - 0.06 K/UL Final    DF 07/28/2017 AUTOMATED    Final    Sodium 07/28/2017 143  136 - 145 mmol/L Final    Potassium 07/28/2017 4.3  3.5 - 5.5 mmol/L Final    Chloride 07/28/2017 103  100 - 108 mmol/L Final    CO2 07/28/2017 33* 21 - 32 mmol/L Final    Anion gap 07/28/2017 7  3.0 - 18 mmol/L Final    Glucose 07/28/2017 123* 74 - 99 mg/dL Final    BUN 07/28/2017 11  7.0 - 18 MG/DL Final    Creatinine 07/28/2017 0.78  0.6 - 1.3 MG/DL Final    BUN/Creatinine ratio 07/28/2017 14  12 - 20   Final    GFR est AA 07/28/2017 >60  >60 ml/min/1.73m2 Final    GFR est non-AA 07/28/2017 >60  >60 ml/min/1.73m2 Final    Comment: (NOTE)  Estimated GFR is calculated using the Modification of Diet in Renal   Disease (MDRD) Study equation, reported for both  Americans   (GFRAA) and non- Americans (GFRNA), and normalized to 1.73m2   body surface area. The physician must decide which value applies to   the patient. The MDRD study equation should only be used in   individuals age 25 or older. It has not been validated for the   following: pregnant women, patients with serious comorbid conditions,   or on certain medications, or persons with extremes of body size,   muscle mass, or nutritional status.  Calcium 07/28/2017 8.8  8.5 - 10.1 MG/DL Final    Bilirubin, total 07/28/2017 0.4  0.2 - 1.0 MG/DL Final    ALT (SGPT) 07/28/2017 17  16 - 61 U/L Final    AST (SGOT) 07/28/2017 12* 15 - 37 U/L Final    Alk.  phosphatase 07/28/2017 59  45 - 117 U/L Final    Protein, total 07/28/2017 6.3* 6.4 - 8.2 g/dL Final    Albumin 07/28/2017 3.6  3.4 - 5.0 g/dL Final    Globulin 07/28/2017 2.7  2.0 - 4.0 g/dL Final    A-G Ratio 07/28/2017 1.3  0.8 - 1.7   Final    TSH 07/28/2017 0.82  0.36 - 3.74 uIU/mL Final   Hospital Outpatient Visit on 07/18/2017   Component Date Value Ref Range Status    Special Requests: 07/18/2017 LEFT CHEEK WOUND   Final    GRAM STAIN 07/18/2017 MODERATE WBC'S    Final    GRAM STAIN 07/18/2017 FEW GRAM POSITIVE COCCI IN PAIRS    Final    Culture result: 07/18/2017 MODERATE STAPHYLOCOCCUS AUREUS*   Final    Culture result: 07/18/2017 MODERATE STAPHYLOCOCCUS SPECIES, COAGULASE NEGATIVE (TWO MORPHOTYPES)*   Final       .  Results for orders placed or performed during the hospital encounter of 07/28/17   CBC WITH AUTOMATED DIFF   Result Value Ref Range    WBC 7.0 4.6 - 13.2 K/uL RBC 4.37 (L) 4.70 - 5.50 M/uL    HGB 13.5 13.0 - 16.0 g/dL    HCT 41.3 36.0 - 48.0 %    MCV 94.5 74.0 - 97.0 FL    MCH 30.9 24.0 - 34.0 PG    MCHC 32.7 31.0 - 37.0 g/dL    RDW 13.6 11.6 - 14.5 %    PLATELET 644 829 - 687 K/uL    MPV 11.1 9.2 - 11.8 FL    NEUTROPHILS 51 40 - 73 %    LYMPHOCYTES 35 21 - 52 %    MONOCYTES 8 3 - 10 %    EOSINOPHILS 6 (H) 0 - 5 %    BASOPHILS 0 0 - 2 %    ABS. NEUTROPHILS 3.5 1.8 - 8.0 K/UL    ABS. LYMPHOCYTES 2.4 0.9 - 3.6 K/UL    ABS. MONOCYTES 0.6 0.05 - 1.2 K/UL    ABS. EOSINOPHILS 0.4 0.0 - 0.4 K/UL    ABS. BASOPHILS 0.0 0.0 - 0.06 K/UL    DF AUTOMATED     METABOLIC PANEL, COMPREHENSIVE   Result Value Ref Range    Sodium 143 136 - 145 mmol/L    Potassium 4.3 3.5 - 5.5 mmol/L    Chloride 103 100 - 108 mmol/L    CO2 33 (H) 21 - 32 mmol/L    Anion gap 7 3.0 - 18 mmol/L    Glucose 123 (H) 74 - 99 mg/dL    BUN 11 7.0 - 18 MG/DL    Creatinine 0.78 0.6 - 1.3 MG/DL    BUN/Creatinine ratio 14 12 - 20      GFR est AA >60 >60 ml/min/1.73m2    GFR est non-AA >60 >60 ml/min/1.73m2    Calcium 8.8 8.5 - 10.1 MG/DL    Bilirubin, total 0.4 0.2 - 1.0 MG/DL    ALT (SGPT) 17 16 - 61 U/L    AST (SGOT) 12 (L) 15 - 37 U/L    Alk. phosphatase 59 45 - 117 U/L    Protein, total 6.3 (L) 6.4 - 8.2 g/dL    Albumin 3.6 3.4 - 5.0 g/dL    Globulin 2.7 2.0 - 4.0 g/dL    A-G Ratio 1.3 0.8 - 1.7     TSH 3RD GENERATION   Result Value Ref Range    TSH 0.82 0.36 - 3.74 uIU/mL       Assessment / Plan      ICD-10-CM ICD-9-CM    1. Essential hypertension, benign I10 401.1 donepezil (ARICEPT) 10 mg tablet      CBC WITH AUTOMATED DIFF      METABOLIC PANEL, COMPREHENSIVE      TSH 3RD GENERATION      IL COLLECTION VENOUS BLOOD,VENIPUNCTURE   2. DISH (diffuse idiopathic skeletal hyperostosis) M48.10 721.6 donepezil (ARICEPT) 10 mg tablet      CBC WITH AUTOMATED DIFF      METABOLIC PANEL, COMPREHENSIVE      TSH 3RD GENERATION      IL COLLECTION VENOUS BLOOD,VENIPUNCTURE   3.  Atherosclerosis of native coronary artery of native heart without angina pectoris I25.10 414.01 donepezil (ARICEPT) 10 mg tablet      CBC WITH AUTOMATED DIFF      METABOLIC PANEL, COMPREHENSIVE      TSH 3RD GENERATION      TX COLLECTION VENOUS BLOOD,VENIPUNCTURE   4. Dyslipidemia E78.5 272.4 donepezil (ARICEPT) 10 mg tablet      CBC WITH AUTOMATED DIFF      METABOLIC PANEL, COMPREHENSIVE      TSH 3RD GENERATION      TX COLLECTION VENOUS BLOOD,VENIPUNCTURE   5. Anorexia R63.0 783.0 donepezil (ARICEPT) 10 mg tablet      CBC WITH AUTOMATED DIFF      METABOLIC PANEL, COMPREHENSIVE      TSH 3RD GENERATION      TX COLLECTION VENOUS BLOOD,VENIPUNCTURE     Labs  Add Aricept - will follow memory and oral intake on this med  Will Add Remeron to try to increase appetite    Follow-up Disposition:  Return in about 3 months (around 10/28/2017). I asked Suze Asher if he has any questions and I answered the questions. Suze Aguillon. states that he understands the treatment plan and agrees with the treatment plan

## 2017-07-31 NOTE — TELEPHONE ENCOUNTER
Patients [facility] requesting refills on the Percocet 10/325 and Oxycontin 20mg.      Patient last seen on 4/26/2017    Melly Sen (wife) p# [phone]

## 2017-07-31 NOTE — TELEPHONE ENCOUNTER
We need to obtain a random UDS. I approved his [facility] for appropriate DNF date. Per  he last filled his Percocet for breakthrough pain on 07/24, he should have enough until at least 08/23. We need the UDS prior. He has to keep his appointments in order to get refills, This is law. We will not continue to refill his meds if he cancels again. FYI he can always schedule with an NP if he he needs to re-schedule.  Narcan sent to pharmacy per new regulations

## 2017-08-01 NOTE — TELEPHONE ENCOUNTER
Called patient to inform, Reached unidentified voicemail, left message, identified myself/facility/callback number, requested return call to facility.

## 2017-08-02 NOTE — TELEPHONE ENCOUNTER
Spoke with wife Melly Chatterjee, on HIPAA, informed Melly Chatterjee Rx for oxycontin is ready for , however percocet was not due to be filled until 08/23/2017. Also reminded Mrs. Eber Krabbe that patient must be seen regularly in order to obtain his pain medications from our office, if he continues to cancel or no show, they will not be able to continue to provide his pain meds. Per [facility], on HIPAA, they had a family emergency arise, and were unable to make the last appt, and understand they can schedule with a NP if needed. Melly Chatterjee verbalized agreement/understanding. Will have Mr. Eber Krabbe come in tomorrow to  Rx. Did not inform Melly Chatterjee, wife on HIPAA, that a urine specimen will be needed, however Melly Chatterjee did understand that Mr. Eber Krabbe needs to accompany her when picking up med and for him to bring his photo id. No further action required at this time.

## 2017-08-03 NOTE — PROGRESS NOTES
Pt came in office to  [address] DNF 08/06/2017 accompanied with wife. UDS was done at this time in the office per NP Christel Mauro request. Pt's wife (on HIPAA) reminded of pt's appt on 08/30/2017 with Dr. Philippe Yancey.

## 2017-08-11 PROBLEM — R91.1 LUNG NODULE: Status: ACTIVE | Noted: 2017-01-01

## 2017-08-11 PROBLEM — N40.0 PROSTATE ENLARGEMENT: Status: ACTIVE | Noted: 2017-01-01

## 2017-08-11 PROBLEM — R65.20 SEVERE SEPSIS (HCC): Status: ACTIVE | Noted: 2017-01-01

## 2017-08-11 PROBLEM — E87.6 HYPOKALEMIA: Status: ACTIVE | Noted: 2017-01-01

## 2017-08-11 PROBLEM — G93.40 ACUTE ENCEPHALOPATHY: Status: ACTIVE | Noted: 2017-01-01

## 2017-08-11 PROBLEM — J18.9 CAP (COMMUNITY ACQUIRED PNEUMONIA): Status: ACTIVE | Noted: 2017-01-01

## 2017-08-11 PROBLEM — K76.89 LIVER NODULE: Status: ACTIVE | Noted: 2017-01-01

## 2017-08-11 PROBLEM — A41.9 SEPSIS (HCC): Status: ACTIVE | Noted: 2017-01-01

## 2017-08-11 PROBLEM — K80.20 CALCULUS OF GALLBLADDER WITHOUT CHOLECYSTITIS: Status: ACTIVE | Noted: 2017-01-01

## 2017-08-11 NOTE — ED NOTES
Patient spouse stated Sara Figueroa seemed ok until the doctor took him of the memory pill\" states this am patient was agitated and trying to get out of the house, so called 911.  Dr. Anup Liang is PMD.

## 2017-08-11 NOTE — Clinical Note
Status[de-identified] Inpatient [101] Type of Bed: Telemetry [19] Inpatient Hospitalization Certified Necessary for the Following Reasons: 3. Patient receiving treatment that can only be provided in an inpatient setting (further clarification in H&P documentation) Admitting Diagnosis: Sepsis ([address] [[id number]] Admitting Diagnosis: CAP (community acquired pneumonia) [[id number]] Admitting Diagnosis: Lung nodule [[id number]] Admitting Diagnosis: Hypokalemia [020369] Admitting Diagnosis: Acute encephalopathy [[id number]] Admitting Physician: Ankur Daniels Attending Physician: Ankur Daniels Estimated Length of Stay: 2 Midnights Discharge Plan[de-identified] Home with Office Follow-up

## 2017-08-11 NOTE — ED PROVIDER NOTES
HPI Comments: Wale Uriarte. is a 80 y.o. male with a hx of CAD, HTN, and previous CVA with some residual dysarthria who presents via EMS, c/o altered mental status on set Monday. Pt is normally alert and ambulatory. Per EMS, Pt's wife states that the pt has muddled speech at baseline, but is worse than normal over the last few days. Pt was placed on O2 en route due to an initial O2 sat that was 92 and improved to 98. Pt has had no complaints during EMS arrival and transport. Pt has had a cough for the past 2 months. Pt c/o bladder pain for \"a few days. \" PSHx of CABG x4. NKDA. The history is provided by the patient and the EMS personnel. No  was used. Past Medical History:   Diagnosis Date    3-vessel coronary artery disease 02/13/2004    Tech limited. Low normal to mildly depressed LV systolic function. LVH. DDfx. LAE. PASP 25-30. 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 mLAD 100%. CX diffuse nonobstructive. OM1 patent stent. pRCA 100%. Dbl seq SVG to D1, OM1 100%. SVG to pRPDA 99% (3.5 x 23-mm Xience stent). Patent mid segment stent. LIMA to LAD ok.  Cardiac echocardiogram 11/16/2014    [facility]:  EF 55-60%. DDfx. Normal RVSP. Mild TR. Neg bubble study for atrial shunt. No significant valvular pathology.  Cardiac nuclear imaging test, high risk 06/15/2012    Lg area of mod mid to distal anterior ischemia. Lg area of inferior ischemia. EF 57%. Mild mid-distal anterior, inferior hypk.   High risk pharm stress test.    Cervical spine pain     Deviated septum     s/p corrective surgery 7/1/2010    Disc disease, degenerative, cervical     joint pain, back pain    Dysphagia, unspecified     Essential hypertension, benign     Gout with other specified manifestations     Head injury     frequent headaches    Hypercholesterolemia     Insomnia, unspecified     Lumbar canal stenosis     Meniere's disease     Myocardial infarction, anterior wall (HCC)     DIEGO (obstructive sleep apnea)     on CPAP    S/P diskectomy 7/5/07    cervical    Spinal stenosis, lumbar region, without neurogenic claudication     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     Unspecified sinusitis (chronic)     Ventral hernia, unspecified, without mention of obstruction or gangrene     S/p repair 6/2003       Past Surgical History:   Procedure Laterality Date    CARDIAC CATHETERIZATION  3/15/2017         CARDIAC SURG PROCEDURE UNLIST  1/04    angioplasty of a proximal left anterior descending    CARDIAC SURG PROCEDURE UNLIST  6/2/08    angioplasty and stenting of totally obstructed saphenous vein graft to the posterior descending artery    CORONARY ARTERY ANGIOGRAM  3/15/2017         HX APPENDECTOMY      HX CATARACT REMOVAL      bilateral    HX CERVICAL DISKECTOMY  7/07    and fusion    HX CORONARY ARTERY BYPASS GRAFT  4/14/94    X4, with LIMA to the LAD, double sequential to the diagonal and circumflex and single to the main right coronary artery    HX CORONARY STENT PLACEMENT  6/20/2012    HX GI  1992    two feet of colon removed    HX HEART CATHETERIZATION  6/02/08    HX HEART CATHETERIZATION  6/20/2012    HX HEENT  7/1/10    deviated nasal septum surgery    HX HERNIA REPAIR  7/2003    two prior hernia repairs in the past    HX OTHER SURGICAL      HX PTCA  6/20/2012    HX TONSILLECTOMY      as a child    CO [address] [facility] [address]      for crushed disk         Family History:   Problem Relation Age of Onset    Heart Disease Father     Stroke Father        Social History     Social History    Marital status:      Spouse name: N/A    Number of children: N/A    Years of education: N/A     Occupational History    Not on file. Social History Main Topics    Smoking status: Never Smoker    Smokeless tobacco: Never Used    Alcohol use Yes    Drug use: No    Sexual activity: No     Other Topics Concern    Not on file     Social History Narrative         ALLERGIES: Review of patient's allergies indicates no known allergies. Review of Systems   Unable to perform ROS: Mental status change       Vitals:    08/11/17 1111 08/11/17 1115 08/11/17 1130 08/11/17 1145   BP: 113/52 108/51 107/50    Pulse: 83 76 73 76   Resp: 11 20 20 22   Temp:       SpO2: 100% 99% 97% 96%   Weight:       Height:                Physical Exam   Constitutional: He appears well-developed and well-nourished. No distress. HENT:   Head: Normocephalic and atraumatic. Dry mucous membranes   Eyes: Conjunctivae and EOM are normal. Pupils are equal, round, and reactive to light. No scleral icterus. Neck: Normal range of motion. Neck supple. No JVD present. No thyromegaly present. No meningeal signs. Cardiovascular: Normal rate, regular rhythm, S1 normal and S2 normal.  Exam reveals no gallop and no friction rub. No murmur heard. Pulmonary/Chest: Effort normal. No accessory muscle usage. No respiratory distress. Course breath sounds bilaterally   Abdominal: Soft. Normal appearance. He exhibits no distension. There is tenderness. There is no rigidity, no rebound and no guarding. Mild tenderness to palpation in the suprapubic area and LLQ   Musculoskeletal: Normal range of motion. He exhibits edema. He exhibits no tenderness. Trace pedal edema bilaterally   Neurological: He is alert. He has normal strength. No cranial nerve deficit or sensory deficit. Coordination normal.   AxO x2  no facial asymmetry  equal  strength bilaterally   Skin: Skin is warm and intact. No rash noted. Psychiatric: He has a normal mood and affect. Vitals reviewed.        MDM  Number of Diagnoses or Management Options  Acute encephalopathy:   CAP (community acquired pneumonia):   Hypokalemia:   Lung nodule:   Sepsis, due to unspecified organism [facility]):   Diagnosis management comments: Karen Drummond is a 80 y.o. Male coming in via EMS for AMS. No focal deficits. Patient A+Ox 2 and hx of some chronic dysarthria from previous CVA. Low suspicion of acute CVA. Head CT WNL and not a tPA candidate as symptoms 33 days old. CXR and exam consistent with PNA and patient febrile and coughing. HR and BP WNL, no evidence of septic shock. Broad spectrum abx started for sepsis and suspected CAP. Low suspicion of meningitis and do not feel lumbar puncture indicated at this time with alternative source of fever apparent. Will admit.     ED Course       Procedures    Vitals:  Patient Vitals for the past 12 hrs:   Temp Pulse Resp BP SpO2   08/11/17 1145 - 76 22 - 96 %   08/11/17 1130 - 73 20 107/50 97 %   08/11/17 1115 - 76 20 108/51 99 %   08/11/17 1111 - 83 11 113/52 100 %   08/11/17 0945 - 79 19 127/56 98 %   08/11/17 0830 (!) 101.5 °F (38.6 °C) 86 25 134/55 95 %       Medications ordered:   Medications   iopamidol (ISOVUE 300) 61 % contrast injection 100 mL (not administered)   piperacillin-tazobactam (ZOSYN) 4.5 g in 0.9% sodium chloride (MBP/ADV) 100 mL MBP (4.5 g IntraVENous New Bag 8/11/17 1138)   vancomycin (VANCOCIN) 1,750 mg in 0.9% sodium chloride 500 mL IVPB (not administered)   Vancomycin trough level (not administered)   vancomycin (VANCOCIN) 1,250 mg in 0.9% sodium chloride 250 mL IVPB (not administered)   potassium chloride 10 mEq in 100 ml IVPB (not administered)   potassium chloride 10 mEq in 100 ml IVPB (not administered)   sodium chloride (NS) flush 5-10 mL (not administered)   sodium chloride 0.9 % bolus infusion 2,000 mL (2,000 mL IntraVENous New Bag 8/11/17 0856)         Lab findings:  Recent Results (from the past 12 hour(s))   EKG, 12 LEAD, INITIAL    Collection Time: 08/11/17  8:38 AM   Result Value Ref Range    Ventricular Rate 81 BPM    Atrial Rate 81 BPM    P-R Interval 198 ms    QRS Duration 156 ms    Q-T Interval 408 ms    QTC Calculation (Bezet) 473 ms    Calculated P Axis 53 degrees    Calculated R Axis -24 degrees    Calculated T Axis 47 degrees    Diagnosis       Normal sinus rhythm  Right bundle branch block  Abnormal ECG  When compared with ECG of 10-FEB-2017 09:49,  No significant change was found    Confirmed by Alexanrdia Platt MD ([id number]) on 8/11/2017 11:14:10 AM     CBC WITH AUTOMATED DIFF    Collection Time: 08/11/17  8:45 AM   Result Value Ref Range    WBC 14.5 (H) 4.6 - 13.2 K/uL    RBC 4.31 (L) 4.70 - 5.50 M/uL    HGB 13.5 13.0 - 16.0 g/dL    HCT 39.8 36.0 - 48.0 %    MCV 92.3 74.0 - 97.0 FL    MCH 31.3 24.0 - 34.0 PG    MCHC 33.9 31.0 - 37.0 g/dL    RDW 13.2 11.6 - 14.5 %    PLATELET 807 355 - 108 K/uL    MPV 11.2 9.2 - 11.8 FL    NEUTROPHILS 77 (H) 42 - 75 %    BAND NEUTROPHILS 10 (H) 0 - 5 %    LYMPHOCYTES 7 (L) 20 - 51 %    MONOCYTES 6 2 - 9 %    EOSINOPHILS 0 0 - 5 %    BASOPHILS 0 0 - 3 %    ABS. NEUTROPHILS 12.6 (H) 1.8 - 8.0 K/UL    ABS. LYMPHOCYTES 1.0 0.8 - 3.5 K/UL    ABS. MONOCYTES 0.9 0 - 1.0 K/UL    ABS. EOSINOPHILS 0.0 0.0 - 0.4 K/UL    ABS. BASOPHILS 0.0 0.0 - 0.06 K/UL    DF MANUAL      PLATELET COMMENTS ADEQUATE PLATELETS      RBC COMMENTS OVALOCYTES  1+       METABOLIC PANEL, COMPREHENSIVE    Collection Time: 08/11/17  8:45 AM   Result Value Ref Range    Sodium 139 136 - 145 mmol/L    Potassium 3.2 (L) 3.5 - 5.5 mmol/L    Chloride 102 100 - 108 mmol/L    CO2 30 21 - 32 mmol/L    Anion gap 7 3.0 - 18 mmol/L    Glucose 193 (H) 74 - 99 mg/dL    BUN 15 7.0 - 18 MG/DL    Creatinine 0.93 0.6 - 1.3 MG/DL    BUN/Creatinine ratio 16 12 - 20      GFR est AA >60 >60 ml/min/1.73m2    GFR est non-AA >60 >60 ml/min/1.73m2    Calcium 8.9 8.5 - 10.1 MG/DL    Bilirubin, total 0.6 0.2 - 1.0 MG/DL    ALT (SGPT) 14 (L) 16 - 61 U/L    AST (SGOT) 12 (L) 15 - 37 U/L    Alk.  phosphatase 67 45 - 117 U/L    Protein, total 6.9 6.4 - 8.2 g/dL    Albumin 3.4 3.4 - 5.0 g/dL    Globulin 3.5 2.0 - 4.0 g/dL    A-G Ratio 1.0 0.8 - 1.7     MAGNESIUM    Collection Time: 08/11/17  8:45 AM   Result Value Ref Range    Magnesium 1.8 1.6 - 2.6 mg/dL   LIPASE    Collection Time: 08/11/17  8:45 AM   Result Value Ref Range    Lipase 60 (L) 73 - 393 U/L   CARDIAC PANEL,(CK, CKMB & TROPONIN)    Collection Time: 08/11/17  8:45 AM   Result Value Ref Range    CK 45 39 - 308 U/L    CK - MB <1.0 <3.6 ng/ml    CK-MB Index  0.0 - 4.0 %     CALCULATION NOT PERFORMED WHEN RESULT IS BELOW LINEAR LIMIT    Troponin-I, Qt. <0.02 0.0 - 0.045 NG/ML   PROTHROMBIN TIME + INR    Collection Time: 08/11/17  8:45 AM   Result Value Ref Range    Prothrombin time 13.5 11.5 - 15.2 sec    INR 1.1 0.8 - 1.2     PTT    Collection Time: 08/11/17  8:45 AM   Result Value Ref Range    aPTT 29.3 23.0 - 36.4 SEC   TSH 3RD GENERATION    Collection Time: 08/11/17  8:45 AM   Result Value Ref Range    TSH 0.50 0.36 - 3.74 uIU/mL   POC LACTIC ACID    Collection Time: 08/11/17  8:57 AM   Result Value Ref Range    Lactic Acid (POC) 2.6 (HH) 0.4 - 2.0 mmol/L   URINALYSIS W/ RFLX MICROSCOPIC    Collection Time: 08/11/17 11:14 AM   Result Value Ref Range    Color YELLOW      Appearance CLEAR      Specific gravity 1.029 1.005 - 1.030      pH (UA) 6.0 5.0 - 8.0      Protein NEGATIVE  NEG mg/dL    Glucose NEGATIVE  NEG mg/dL    Ketone NEGATIVE  NEG mg/dL    Bilirubin NEGATIVE  NEG      Blood NEGATIVE  NEG      Urobilinogen 1.0 0.2 - 1.0 EU/dL    Nitrites NEGATIVE  NEG      Leukocyte Esterase NEGATIVE  NEG     AMMONIA    Collection Time: 08/11/17 11:30 AM   Result Value Ref Range    Ammonia 26 11 - 32 UMOL/L     EKG interpretation by ED Physician:  EKG: NSR @ 81, RBBB pattern, consistent with EKG, no acute ischemic changes. X-Ray, CT or other radiology findings or impressions:  XR CHEST PORT      Impression:    Hazy opacity at the left lung base may represent atelectasis versus infiltrate. Possible small left pleural effusion.   As interpreted by RAD. CT HEAD WO CONT    (Results Pending)   CT ABD PELV W CONT    (Results Pending)     Progress notes, Consult notes or additional Procedure notes:   9:51 AM Wife confirms previous CVA and mild residual speech deficits, which have worsened slightly over the past few days. Consult:  Discussed care with Dr. Angelica Wolff, Hospitalist. Standard discussion; including history of patients chief complaint, available diagnostic results, and treatment course. Agrees to admit to telemetry. 11:55 AM, 8/11/2017     Reevaluation of patient:   Sepsis 3-6 hour reevaluation and exam:       Reevaluation:    Vital Signs:   Patient Vitals for the past 12 hrs:   Temp Pulse Resp BP SpO2   08/11/17 1145 - 76 22 - 96 %   08/11/17 1130 - 73 20 107/50 97 %   08/11/17 1115 - 76 20 108/51 99 %   08/11/17 1111 - 83 11 113/52 100 %   08/11/17 0945 - 79 19 127/56 98 %   08/11/17 0830 (!) 101.5 °F (38.6 °C) 86 25 134/55 95 %       Cardiopulmonary assessment:  RESP: Chest clear, equal breath sounds. CV: S1 and S2 WNL; No murmurs, gallops or rubs. Capillary refill:  3 sec  Peripheral pulse:   74 BMP    Skin exam:  Skin color: WNL  Skin Turgor: decreased    Sepsis 3-6 hour reevaluation and exam performed at 12:10. All results dicussed with wife and she is in agreement with plan for admission. Disposition:  Diagnosis:   1. Sepsis, due to unspecified organism ([facility] [address]    2. Acute encephalopathy    3. CAP (community acquired pneumonia)    4. Lung nodule    5. Hypokalemia        Disposition: Admit    Follow-up Information     None           Patient's Medications   Start Taking    No medications on file   Continue Taking    ALLOPURINOL (ZYLOPRIM) 300 MG TABLET    TAKE 1 TABLET BY MOUTH ONCE DAILY    ASPIRIN DELAYED-RELEASE 81 MG TABLET    Take 81 mg by mouth two (2) times a day. DONEPEZIL (ARICEPT) 10 MG TABLET    Take 10 mg by mouth nightly.     INDAPAMIDE (LOZOL) 1.25 MG TABLET    TAKE 1 TABLET BY MOUTH ONCE DAILY METOPROLOL TARTRATE (LOPRESSOR) 50 MG TABLET    TAKE 1 TABLET BY MOUTH 2 TIMES A DAY    MIRTAZAPINE (REMERON SOL-TAB) 15 MG DISINTEGRATING TABLET    1 tablet at bedtime    MULTIVITAMINS W-MINERALS/LUT (CENTRUM SILVER PO)    Take 1 Tab by mouth daily. NALOXONE (NARCAN) 4 MG/ACTUATION SPRY    Use 1 spray intranasally into 1 nostril. NAMENDA XR 28 MG CAPSULE    Take 28 mg by mouth daily. NIACIN (NIASPAN) 1,000 MG TB24 TAB    Take 1 tablet by mouth nightly. NITROSTAT 0.4 MG SL TABLET    DISSOLVE 1 TABLET UNDER THE TONGUE EVERY 5 MINUTES AS NEEDED    OXYCODONE ER (OXYCONTIN) 20 MG ER TABLET    Take 1 Tab by mouth every twelve (12) hours. Max Daily Amount: 40 mg. Indications: Chronic Pain    OXYCODONE-ACETAMINOPHEN (PERCOCET 10)  MG PER TABLET    Take 1 Tab by mouth every eight (8) hours as needed (for Breakthrough pain). Max Daily Amount: 3 Tabs. Indications: Pain    POTASSIUM CHLORIDE (KLOR-CON) 10 MEQ TABLET    TAKE 1 TABLET BY MOUTH 2 TIMES A DAY    SIMVASTATIN (ZOCOR) 40 MG TABLET    TAKE 1 TABLET BY MOUTH ONCE DAILY   These Medications have changed    No medications on file   Stop Taking    No medications on file         Scribe [address] [place] [address] acting as a scribe for and in the presence of Sierra Duckworth MD      August 11, 2017 at 8:37 AM       Provider Attestation:      I personally performed the services described in the documentation, reviewed the documentation, as recorded by the scribe in my presence, and it accurately and completely records my words and actions.  August 11, 2017 at 8:37 AM - Sierra Duckworth MD

## 2017-08-11 NOTE — ROUTINE PROCESS
Attempted to call report to floor. TRANSFER - OUT REPORT:    Verbal report given to Sita on [address]  being transferred to [phone] for routine progression of care       Report consisted of patients Situation, Background, Assessment and   Recommendations(SBAR). Information from the following report(s) SBAR, Kardex, ED Summary and OR Summary was reviewed with the receiving nurse. Lines:   Peripheral IV 08/11/17 Right; Lower Arm (Active)   Site Assessment Clean, dry, & intact 8/11/2017  8:56 AM   Phlebitis Assessment 0 8/11/2017  8:56 AM   Infiltration Assessment 0 8/11/2017  8:56 AM   Dressing Status Clean, dry, & intact 8/11/2017  8:56 AM   Dressing Type Transparent 8/11/2017  8:56 AM   Hub Color/Line Status Green;Flushed;Patent 8/11/2017  8:56 AM   Action Taken Blood drawn 8/11/2017  8:56 AM       Peripheral IV 08/11/17 Left; Lower Arm (Active)   Site Assessment Clean, dry, & intact 8/11/2017  9:28 AM   Phlebitis Assessment 0 8/11/2017  9:28 AM   Infiltration Assessment 0 8/11/2017  9:28 AM   Dressing Status Clean, dry, & intact 8/11/2017  9:28 AM   Dressing Type Transparent 8/11/2017  9:28 AM   Hub Color/Line Status Pink;Flushed;Patent 8/11/2017  9:28 AM   Action Taken Blood drawn 8/11/2017  9:28 AM        Opportunity for questions and clarification was provided.       Patient transported with:

## 2017-08-11 NOTE — ED NOTES
Pt cleaned and changed of soiled linens. Pt repositioned for comfort and reconnected to monitoring devices. Pt and family deny any further needs.

## 2017-08-11 NOTE — H&P
Admission History and Physical  [address] [facility]      Patient: Sandra Gannon. MRN: [id number]  CSN: [id number]    YOB: 1935  Age: 80 y.o. Sex: male      DOA: 8/11/2017       HPI:     Sandra Gannon. is a 80 y.o. male with PMH CAD s/p stents & angioplasties, CABG x4, diverticulitis, Spinal stenosis, periumbilical hernia s/p repair w/ mesh, gout, CVA in 2014, DM2- resolved?, and HTN, now presenting with complaint of unusual behavior, not speaking clearly, cough for few days, shortness of breath, and agitation. Pt lethargic and falling asleep between questions. He can answer his name, but does not know location, day, year, or president. Wife reports that his doctor discontinued his Nameda and donepezil on 7/28 and started him on mirtazapine on 7/31. On 8/9, pt got out of bed and tried to leave the house @ 0300. Wife was awoken by the house alarm and brought him back to bed. On 8/10, he was lethargic and unable to get dressed until late afternoon because he kept falling asleep. On 8/11, he got out of bed and tried to leave the house again @ 0400, but again wife was awoken by house alarm and brought him back to bed. ED Course: CT Abd/pelvis, CT Head, CXR, EKG      Review of Systems  ENT ROS: negative for - headaches, hearing change or visual changes  Respiratory ROS: positive for - cough, shortness of breath, negative for hemoptysis  Cardiovascular ROS: negative for - chest pain, edema   Gastrointestinal ROS: negative for - abdominal pain, blood in stools, diarrhea, nausea/vomiting; positive for constipation  Genito-Urinary ROS: negative for - dysuria, hematuria or urinary frequency/urgency  Neurological ROS: negative for - dizziness, headaches,       Past Medical History:   Diagnosis Date    3-vessel coronary artery disease 02/13/2004    Tech limited. Low normal to mildly depressed LV systolic function. LVH. DDfx. LAE. PASP 25-30.    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 mLAD 100%. CX diffuse nonobstructive. OM1 patent stent. pRCA 100%. Dbl seq SVG to D1, OM1 100%. SVG to pRPDA 99% (3.5 x 23-mm Xience stent). Patent mid segment stent. LIMA to LAD ok.  Cardiac echocardiogram 11/16/2014    [facility]:  EF 55-60%. DDfx. Normal RVSP. Mild TR. Neg bubble study for atrial shunt. No significant valvular pathology.  Cardiac nuclear imaging test, high risk 06/15/2012    Lg area of mod mid to distal anterior ischemia. Lg area of inferior ischemia. EF 57%. Mild mid-distal anterior, inferior hypk.   High risk pharm stress test.    Cervical spine pain     Deviated septum     s/p corrective surgery 7/1/2010    Disc disease, degenerative, cervical     joint pain, back pain    Dysphagia, unspecified     Essential hypertension, benign     Gout with other specified manifestations     Head injury     frequent headaches    Hypercholesterolemia     Insomnia, unspecified     Lumbar canal stenosis     Meniere's disease     Myocardial infarction, anterior wall (HCC)     DIEGO (obstructive sleep apnea)     on CPAP    S/P diskectomy 7/5/07    cervical    Spinal stenosis, lumbar region, without neurogenic claudication     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     Unspecified sinusitis (chronic)     Ventral hernia, unspecified, without mention of obstruction or gangrene     S/p repair 6/2003       Past Surgical History:   Procedure Laterality Date    CARDIAC CATHETERIZATION  3/15/2017         CARDIAC SURG PROCEDURE UNLIST  1/04    angioplasty of a proximal left anterior descending    CARDIAC SURG PROCEDURE UNLIST  6/2/08    angioplasty and stenting of totally obstructed saphenous vein graft to the posterior descending artery    CORONARY ARTERY ANGIOGRAM  3/15/2017         HX APPENDECTOMY      HX CATARACT REMOVAL      bilateral    HX CERVICAL DISKECTOMY  7/07    and fusion    HX CORONARY ARTERY BYPASS GRAFT  4/14/94    X4, with LIMA to the LAD, double sequential to the diagonal and circumflex and single to the main right coronary artery    HX CORONARY STENT PLACEMENT  6/20/2012    HX GI  1992    two feet of colon removed    HX HEART CATHETERIZATION  6/02/08    HX HEART CATHETERIZATION  6/20/2012    HX HEENT  7/1/10    deviated nasal septum surgery    HX HERNIA REPAIR  7/2003    two prior hernia repairs in the past    HX OTHER SURGICAL      HX PTCA  6/20/2012    HX TONSILLECTOMY      as a child    OR [address] [facility]      for crushed disk       Family History   Problem Relation Age of Onset    Heart Disease Father     Stroke Father        Social History     Social History    Marital status:      Spouse name: N/A    Number of children: N/A    Years of education: N/A     Social History Main Topics    Smoking status: Never Smoker    Smokeless tobacco: Never Used    Alcohol use Yes    Drug use: No    Sexual activity: No     Other Topics Concern    None     Social History Narrative       No Known Allergies    Prior to Admission Medications   Prescriptions Last Dose Informant Patient Reported? Taking? MULTIVITAMINS W-MINERALS/LUT (CENTRUM SILVER PO) 8/10/2017 at Unknown time  Yes Yes   Sig: Take 1 Tab by mouth daily. NITROSTAT 0.4 mg SL tablet 8/10/2017 at Unknown time  No Yes   Sig: DISSOLVE 1 TABLET UNDER THE TONGUE EVERY 5 MINUTES AS NEEDED   allopurinol (ZYLOPRIM) 300 mg tablet 8/10/2017 at Unknown time  No Yes   Sig: TAKE 1 TABLET BY MOUTH ONCE DAILY   aspirin delayed-release 81 mg tablet 8/10/2017 at Unknown time  Yes Yes   Sig: Take 81 mg by mouth two (2) times a day. donepezil (ARICEPT) 10 mg tablet Not Taking at Unknown time  Yes No   Sig: Take 10 mg by mouth nightly.    indapamide (LOZOL) 1.25 mg tablet 8/10/2017 at Unknown time  No Yes   Sig: TAKE 1 TABLET BY MOUTH ONCE DAILY   metoprolol tartrate (LOPRESSOR) 50 mg tablet 8/10/2017 at Unknown time  No Yes   Sig: TAKE 1 TABLET BY MOUTH 2 TIMES A DAY   mirtazapine (REMERON SOL-TAB) 15 mg disintegrating tablet 8/10/2017 at Unknown time  No Yes   Si tablet at bedtime   naloxone (NARCAN) 4 mg/actuation spry Unknown at Unknown time  No No   Sig: Use 1 spray intranasally into 1 nostril. niacin (NIASPAN) 1,000 mg Tb24 tab Not Taking at Unknown time  No No   Sig: Take 1 tablet by mouth nightly. oxyCODONE ER (OXYCONTIN) 20 mg ER tablet 8/10/2017 at Unknown time  No Yes   Sig: Take 1 Tab by mouth every twelve (12) hours. Max Daily Amount: 40 mg. Indications: Chronic Pain   oxyCODONE-acetaminophen (PERCOCET 10)  mg per tablet 8/10/2017 at Unknown time  No Yes   Sig: Take 1 Tab by mouth every eight (8) hours as needed (for Breakthrough pain). Max Daily Amount: 3 Tabs.  Indications: Pain   potassium chloride (KLOR-CON) 10 mEq tablet 8/10/2017 at Unknown time  No Yes   Sig: TAKE 1 TABLET BY MOUTH 2 TIMES A DAY   simvastatin (ZOCOR) 40 mg tablet 8/10/2017 at Unknown time  No Yes   Sig: TAKE 1 TABLET BY MOUTH ONCE DAILY      Facility-Administered Medications: None       Physical Exam:     Patient Vitals for the past 24 hrs:   Temp Pulse Resp BP SpO2   17 1405 - 76 29 - 100 %   17 1400 - 74 21 114/47 100 %   17 1345 - 71 24 129/51 99 %   17 1330 - 73 19 109/57 99 %   17 1315 - 79 19 124/54 98 %   17 1245 - 74 23 123/55 99 %   17 1230 - 70 23 131/65 99 %   17 1215 - 73 17 147/69 100 %   17 1200 - 75 21 96/51 97 %   17 1145 - 76 22 119/42 96 %   17 1130 - 73 20 107/50 97 %   17 1115 - 76 20 108/51 99 %   17 1111 - 83 11 113/52 100 %   17 0945 - 79 19 127/56 98 %   17 0830 (!) 101.5 °F (38.6 °C) 86 25 134/55 95 %       Physical Exam:   General: Responsive and in No acute distress. Pt lethargic and fell asleep between questions  HEENT: Conjunctiva pink, sclera anicteric. EOMI. No other gross abnormalities appreciated. CV:  RRR. No murmurs, rubs, or gallops appreciated. No visible pulsations or thrills. RESP:  Unlabored breathing. Lungs clear to auscultation. No wheeze, rales, or rhonchi. Equal expansion bilaterally. ABD:  Soft, nondistended. Normoactive bowel sounds. No hepatosplenomegaly. Mild diffuse tenderness. Ext:  No edema. 2+ radial and dp pulses bilaterally. Skin:  No rashes, lesions, or ulcers. Good turgor. Neuro: Lethargic and oriented x1, handgrip equal bilaterally, no apparent sensory deficits, UE and LE strength intact, dysarthria    IMAGING:   CT Abd/pelvis: L lung infiltrates. Infiltrates in R lung base. 0.6cm nodule in R mid lobe, 1.0cm nodule in R lower lobe, 3.1 x 1.3 cm node in subcarinal region. 1.2 x 1.0 cm hypodensity in R lobe of liver; Multiple gallstones w/ distension. Apparent stool retention in the ascending and descending colon. Prominently enlarged prostate  CT Head: No acute intracranial process. CXR: Hazy opacity at the left lung base may represent atelectasis versus infiltrate. Possible small left pleural effusion. EKG: Normal sinus rhythm.  Right bundle branch block    Recent Results (from the past 12 hour(s))   EKG, 12 LEAD, INITIAL    Collection Time: 08/11/17  8:38 AM   Result Value Ref Range    Ventricular Rate 81 BPM    Atrial Rate 81 BPM    P-R Interval 198 ms    QRS Duration 156 ms    Q-T Interval 408 ms    QTC Calculation (Bezet) 473 ms    Calculated P Axis 53 degrees    Calculated R Axis -24 degrees    Calculated T Axis 47 degrees    Diagnosis       Normal sinus rhythm  Right bundle branch block  Abnormal ECG  When compared with ECG of 10-FEB-2017 09:49,  No significant change was found    Confirmed by Andre Alexander MD ([id number]) on 8/11/2017 11:14:10 AM     CBC WITH AUTOMATED DIFF    Collection Time: 08/11/17  8:45 AM   Result Value Ref Range    WBC 14.5 (H) 4.6 - 13.2 K/uL    RBC 4.31 (L) 4.70 - 5.50 M/uL    HGB 13.5 13.0 - 16.0 g/dL    HCT 39.8 36.0 - 48.0 %    MCV 92.3 74.0 - 97.0 FL    MCH 31.3 24.0 - 34.0 PG    MCHC 33.9 31.0 - 37.0 g/dL    RDW 13.2 11.6 - 14.5 %    PLATELET 185 632 - 101 K/uL    MPV 11.2 9.2 - 11.8 FL    NEUTROPHILS 77 (H) 42 - 75 %    BAND NEUTROPHILS 10 (H) 0 - 5 %    LYMPHOCYTES 7 (L) 20 - 51 %    MONOCYTES 6 2 - 9 %    EOSINOPHILS 0 0 - 5 %    BASOPHILS 0 0 - 3 %    ABS. NEUTROPHILS 12.6 (H) 1.8 - 8.0 K/UL    ABS. LYMPHOCYTES 1.0 0.8 - 3.5 K/UL    ABS. MONOCYTES 0.9 0 - 1.0 K/UL    ABS. EOSINOPHILS 0.0 0.0 - 0.4 K/UL    ABS. BASOPHILS 0.0 0.0 - 0.06 K/UL    DF MANUAL      PLATELET COMMENTS ADEQUATE PLATELETS      RBC COMMENTS OVALOCYTES  1+       METABOLIC PANEL, COMPREHENSIVE    Collection Time: 08/11/17  8:45 AM   Result Value Ref Range    Sodium 139 136 - 145 mmol/L    Potassium 3.2 (L) 3.5 - 5.5 mmol/L    Chloride 102 100 - 108 mmol/L    CO2 30 21 - 32 mmol/L    Anion gap 7 3.0 - 18 mmol/L    Glucose 193 (H) 74 - 99 mg/dL    BUN 15 7.0 - 18 MG/DL    Creatinine 0.93 0.6 - 1.3 MG/DL    BUN/Creatinine ratio 16 12 - 20      GFR est AA >60 >60 ml/min/1.73m2    GFR est non-AA >60 >60 ml/min/1.73m2    Calcium 8.9 8.5 - 10.1 MG/DL    Bilirubin, total 0.6 0.2 - 1.0 MG/DL    ALT (SGPT) 14 (L) 16 - 61 U/L    AST (SGOT) 12 (L) 15 - 37 U/L    Alk.  phosphatase 67 45 - 117 U/L    Protein, total 6.9 6.4 - 8.2 g/dL    Albumin 3.4 3.4 - 5.0 g/dL    Globulin 3.5 2.0 - 4.0 g/dL    A-G Ratio 1.0 0.8 - 1.7     MAGNESIUM    Collection Time: 08/11/17  8:45 AM   Result Value Ref Range    Magnesium 1.8 1.6 - 2.6 mg/dL   LIPASE    Collection Time: 08/11/17  8:45 AM   Result Value Ref Range    Lipase 60 (L) 73 - 393 U/L   CARDIAC PANEL,(CK, CKMB & TROPONIN)    Collection Time: 08/11/17  8:45 AM   Result Value Ref Range    CK 45 39 - 308 U/L    CK - MB <1.0 <3.6 ng/ml    CK-MB Index  0.0 - 4.0 %     CALCULATION NOT PERFORMED WHEN RESULT IS BELOW LINEAR LIMIT    Troponin-I, Qt. <0.02 0.0 - 0.045 NG/ML   PROTHROMBIN TIME + INR    Collection Time: 08/11/17  8:45 AM   Result Value Ref Range    Prothrombin time 13.5 11.5 - 15.2 sec    INR 1.1 0.8 - 1.2     PTT    Collection Time: 08/11/17  8:45 AM   Result Value Ref Range    aPTT 29.3 23.0 - 36.4 SEC   TSH 3RD GENERATION    Collection Time: 08/11/17  8:45 AM   Result Value Ref Range    TSH 0.50 0.36 - 3.74 uIU/mL   POC LACTIC ACID    Collection Time: 08/11/17  8:57 AM   Result Value Ref Range    Lactic Acid (POC) 2.6 (HH) 0.4 - 2.0 mmol/L   URINALYSIS W/ RFLX MICROSCOPIC    Collection Time: 08/11/17 11:14 AM   Result Value Ref Range    Color YELLOW      Appearance CLEAR      Specific gravity 1.029 1.005 - 1.030      pH (UA) 6.0 5.0 - 8.0      Protein NEGATIVE  NEG mg/dL    Glucose NEGATIVE  NEG mg/dL    Ketone NEGATIVE  NEG mg/dL    Bilirubin NEGATIVE  NEG      Blood NEGATIVE  NEG      Urobilinogen 1.0 0.2 - 1.0 EU/dL    Nitrites NEGATIVE  NEG      Leukocyte Esterase NEGATIVE  NEG     AMMONIA    Collection Time: 08/11/17 11:30 AM   Result Value Ref Range    Ammonia 26 11 - 32 UMOL/L   POC LACTIC ACID    Collection Time: 08/11/17  2:09 PM   Result Value Ref Range    Lactic Acid (POC) 1.8 0.4 - 2.0 mmol/L         Assessment/Plan:   80 y.o. male with PMH CAD s/p stents & angioplasties, CABG x4, diverticulitis, Spinal stenosis, periumbilical hernia s/p repair w/ mesh, gout, CVA in 2014, DM2- resolved?, and HTN, now admitted with sepsis of unknown etiology.     Acute encephalopathy/Sepsis of unknown etiology: 2/4 SIRS criteria: WBC: 14.5, Temp: 101.5 @ 0830, 99.1 @ 1609, normotensive, HR 70s-80s, Head CT negative, UA negative  --Vanc 1250mg BID, Zosyn 4.5g q6h- switch to levaquin 750 BID tomorrow  --Sputum cx, blood cx pending  --Neuro checks q4h  -- Hold home mirtazapine  --Hold home pain peds until pt's mental status improves    Lung nodule: 0.6cm nodule in R mid lobe, 1.0cm nodule in R lower lobe, 3.1 x 1.3 cm node in subcarinal region  --Pulm called, said to call pulm consult on Monday if not ICU status    Hypokalemia: K: 3.2, normal sinus rhythm on EKG  --Continue home med: Klor-con 10mEq daily    DM2/HTN/HLD: Per wife, patient was diagnosed after CVA, lost weight, and has resolved. --HbA1c 6.7  --accuchecks AC & HS  --Continue home meds, metoprolol 50mg BID, simvastatin 40mg daily      Diet: Diabetic  DVT Prophylaxis: SCDs  Code Status: Full        Casey Noble MD, PGY-1  [facility]  08/11/17 3:32 PM                           Senior Resident History and Physical  [facility]    HPI:     Patti Navas is a 80 y.o. male with PMH of CAD, hypercholesterolemia, HTN, Gout, DIEGO, CVA  now admitted with complaint of CAP. Upon seeing patient, patient was alone in room AOX1, unable to comprehend what patient was saying. Patient's wife not present in room. History completed based of chart review. Seems like patient has been having worsening speech for the past few days, has a history of CVA and has poor quality speech at baseline. in the ED patient initially had an elevated lactic acid and a elevated WBC, CXR showed possible PNA. Patient initially had a fever, however has resolved. HPI, ROS, PMH, PSH, Family Hx, Social Hx, Home medications, and allergies as above in intern H&P. Which has been reviewed in full. Additional comments to her subjective history include:    Physical Exam:     Visit Vitals    /48    Pulse 80    Temp 99.1 °F (37.3 °C)    Resp 26    Ht 5' 11\" (1.803 m)    Wt 86.2 kg (190 lb)    SpO2 99%    BMI 26.5 kg/m2       General:  WD, WN, NAD  HEENT:  NCAT. Conjunctiva pink, sclera anicteric. PERRL. EOMI. Pharynx moist, nonerythematous. Moist mucous membranes. CV:  RRR, no mumurs. PMI not displaced. RESP:  Unlabored breathing. CTAB  ABD:  Soft, nontender, nondistended. Normoactive bowel sounds. MS:  No joint deformity or instability. No atrophy. Ext:  No edema. 2+ radial and dp pulses bilaterally. Skin:  No rashes, lesions, or ulcers. Good turgor. RECTAL:  See intern physical exam    Labs Reviewed    Recent Results (from the past 12 hour(s))   EKG, 12 LEAD, INITIAL    Collection Time: 08/11/17  8:38 AM   Result Value Ref Range    Ventricular Rate 81 BPM    Atrial Rate 81 BPM    P-R Interval 198 ms    QRS Duration 156 ms    Q-T Interval 408 ms    QTC Calculation (Bezet) 473 ms    Calculated P Axis 53 degrees    Calculated R Axis -24 degrees    Calculated T Axis 47 degrees    Diagnosis       Normal sinus rhythm  Right bundle branch block  Abnormal ECG  When compared with ECG of 10-FEB-2017 09:49,  No significant change was found    Confirmed by Tish Dominique MD ([id number]) on 8/11/2017 11:14:10 AM     CBC WITH AUTOMATED DIFF    Collection Time: 08/11/17  8:45 AM   Result Value Ref Range    WBC 14.5 (H) 4.6 - 13.2 K/uL    RBC 4.31 (L) 4.70 - 5.50 M/uL    HGB 13.5 13.0 - 16.0 g/dL    HCT 39.8 36.0 - 48.0 %    MCV 92.3 74.0 - 97.0 FL    MCH 31.3 24.0 - 34.0 PG    MCHC 33.9 31.0 - 37.0 g/dL    RDW 13.2 11.6 - 14.5 %    PLATELET 068 696 - 245 K/uL    MPV 11.2 9.2 - 11.8 FL    NEUTROPHILS 77 (H) 42 - 75 %    BAND NEUTROPHILS 10 (H) 0 - 5 %    LYMPHOCYTES 7 (L) 20 - 51 %    MONOCYTES 6 2 - 9 %    EOSINOPHILS 0 0 - 5 %    BASOPHILS 0 0 - 3 %    ABS. NEUTROPHILS 12.6 (H) 1.8 - 8.0 K/UL    ABS. LYMPHOCYTES 1.0 0.8 - 3.5 K/UL    ABS. MONOCYTES 0.9 0 - 1.0 K/UL    ABS. EOSINOPHILS 0.0 0.0 - 0.4 K/UL    ABS.  BASOPHILS 0.0 0.0 - 0.06 K/UL    DF MANUAL      PLATELET COMMENTS ADEQUATE PLATELETS      RBC COMMENTS OVALOCYTES  1+       METABOLIC PANEL, COMPREHENSIVE    Collection Time: 08/11/17  8:45 AM   Result Value Ref Range    Sodium 139 136 - 145 mmol/L    Potassium 3.2 (L) 3.5 - 5.5 mmol/L    Chloride 102 100 - 108 mmol/L    CO2 30 21 - 32 mmol/L    Anion gap 7 3.0 - 18 mmol/L    Glucose 193 (H) 74 - 99 mg/dL BUN 15 7.0 - 18 MG/DL    Creatinine 0.93 0.6 - 1.3 MG/DL    BUN/Creatinine ratio 16 12 - 20      GFR est AA >60 >60 ml/min/1.73m2    GFR est non-AA >60 >60 ml/min/1.73m2    Calcium 8.9 8.5 - 10.1 MG/DL    Bilirubin, total 0.6 0.2 - 1.0 MG/DL    ALT (SGPT) 14 (L) 16 - 61 U/L    AST (SGOT) 12 (L) 15 - 37 U/L    Alk.  phosphatase 67 45 - 117 U/L    Protein, total 6.9 6.4 - 8.2 g/dL    Albumin 3.4 3.4 - 5.0 g/dL    Globulin 3.5 2.0 - 4.0 g/dL    A-G Ratio 1.0 0.8 - 1.7     MAGNESIUM    Collection Time: 08/11/17  8:45 AM   Result Value Ref Range    Magnesium 1.8 1.6 - 2.6 mg/dL   LIPASE    Collection Time: 08/11/17  8:45 AM   Result Value Ref Range    Lipase 60 (L) 73 - 393 U/L   CARDIAC PANEL,(CK, CKMB & TROPONIN)    Collection Time: 08/11/17  8:45 AM   Result Value Ref Range    CK 45 39 - 308 U/L    CK - MB <1.0 <3.6 ng/ml    CK-MB Index  0.0 - 4.0 %     CALCULATION NOT PERFORMED WHEN RESULT IS BELOW LINEAR LIMIT    Troponin-I, Qt. <0.02 0.0 - 0.045 NG/ML   PROTHROMBIN TIME + INR    Collection Time: 08/11/17  8:45 AM   Result Value Ref Range    Prothrombin time 13.5 11.5 - 15.2 sec    INR 1.1 0.8 - 1.2     PTT    Collection Time: 08/11/17  8:45 AM   Result Value Ref Range    aPTT 29.3 23.0 - 36.4 SEC   TSH 3RD GENERATION    Collection Time: 08/11/17  8:45 AM   Result Value Ref Range    TSH 0.50 0.36 - 3.74 uIU/mL   POC LACTIC ACID    Collection Time: 08/11/17  8:57 AM   Result Value Ref Range    Lactic Acid (POC) 2.6 (HH) 0.4 - 2.0 mmol/L   URINALYSIS W/ RFLX MICROSCOPIC    Collection Time: 08/11/17 11:14 AM   Result Value Ref Range    Color YELLOW      Appearance CLEAR      Specific gravity 1.029 1.005 - 1.030      pH (UA) 6.0 5.0 - 8.0      Protein NEGATIVE  NEG mg/dL    Glucose NEGATIVE  NEG mg/dL    Ketone NEGATIVE  NEG mg/dL    Bilirubin NEGATIVE  NEG      Blood NEGATIVE  NEG      Urobilinogen 1.0 0.2 - 1.0 EU/dL    Nitrites NEGATIVE  NEG      Leukocyte Esterase NEGATIVE  NEG     AMMONIA    Collection Time: 08/11/17 11:30 AM   Result Value Ref Range    Ammonia 26 11 - 32 UMOL/L   POC LACTIC ACID    Collection Time: 08/11/17  2:09 PM   Result Value Ref Range    Lactic Acid (POC) 1.8 0.4 - 2.0 mmol/L         Assessment/Plan:   80 y.o. male with PMH CAD, hypercholesterolemia, HTN, Gout, DIEGO, CVA  now admitted with complaint of AMS       Altered mental status- most likely d/t CAP. 2/4 SIRS criteria with elevated wbc, febrile  -continue vanc and zosyn, will switch to levaquin 750mg iv 8/12/17  - IV fluids  - follow up sputum and blood cx  - neuro checks q 4 hours  - fall precautions  - aspiration precautions  - daily cbc, bmp  - vitals per unit    Lung nodule/liver nodule-  CT abdomen pelvis shows: ovoid nodule is identified in the right middle lobe measuring 0.6 cm. right lower lobe reveals an even larger nodule measuring 1.0 cm.  Liver right lobe relatively poorly circumscribed 1.2 x 1.0 cm hypodensity is identified in the right lobe of the liver i  - pulm consulted, advised to follow up on Monday  - Will consider MRI of abdomen      Please see intern's note for chronic medical conditions      Salud Haney MD PGY-2  8/11/2017, 5:10 PM

## 2017-08-11 NOTE — ROUTINE PROCESS
Bedside and Verbal shift change report given to [address] (oncoming nurse) by Delfina Apley, RN (offgoing nurse). Report included the following information SBAR, Kardex, MAR and Recent Results. SITUATION:    Code Status: Full Code   Reason for Admission: Sepsis ([facility] [address] [facility]   CAP (community acquired pneumonia)   Lung nodule   Hypokalemia   Acute encephalopathy   Sepsis ([facility] [address]    [facility] day: 0   Problem List:       Hospital Problems  Date Reviewed: 7/28/2017          Codes Class Noted POA    Hypokalemia ICD-10-CM: E87.6  ICD-9-CM: 276.8  8/11/2017 Unknown        Lung nodule ICD-10-CM: R91.1  ICD-9-CM: 793.11  8/11/2017 Unknown        * (Principal)CAP (community acquired pneumonia) ICD-10-CM: J18.9  ICD-9-CM: 486  8/11/2017 Unknown        Sepsis ([facility] [address] ICD-10-CM: A41.9  ICD-9-CM: 038.9, 995.91  8/11/2017 Unknown        Acute encephalopathy ICD-10-CM: G93.40  ICD-9-CM: 348.30  8/11/2017 Unknown              BACKGROUND:    Past Medical History:   Past Medical History:   Diagnosis Date    3-vessel coronary artery disease 02/13/2004    Tech limited. Low normal to mildly depressed LV systolic function. LVH. DDfx. LAE. PASP 25-30. 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 mLAD 100%. CX diffuse nonobstructive. OM1 patent stent. pRCA 100%. Dbl seq SVG to D1, OM1 100%. SVG to pRPDA 99% (3.5 x 23-mm Xience stent). Patent mid segment stent. LIMA to LAD ok.  Cardiac echocardiogram 11/16/2014    [facility]:  EF 55-60%. DDfx. Normal RVSP. Mild TR. Neg bubble study for atrial shunt. No significant valvular pathology.  Cardiac nuclear imaging test, high risk 06/15/2012    Lg area of mod mid to distal anterior ischemia. Lg area of inferior ischemia. EF 57%. Mild mid-distal anterior, inferior hypk.   High risk pharm stress test.    Cervical spine pain     Deviated septum     s/p corrective surgery 7/1/2010    Disc disease, degenerative, cervical     joint pain, back pain    Dysphagia, unspecified     Essential hypertension, benign     Gout with other specified manifestations     Head injury     frequent headaches    Hypercholesterolemia     Insomnia, unspecified     Lumbar canal stenosis     Meniere's disease     Myocardial infarction, anterior wall (HCC)     DIEGO (obstructive sleep apnea)     on CPAP    S/P diskectomy 7/5/07    cervical    Spinal stenosis, lumbar region, without neurogenic claudication     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     Unspecified sinusitis (chronic)     Ventral hernia, unspecified, without mention of obstruction or gangrene     S/p repair 6/2003         Patient taking anticoagulants no     ASSESSMENT:    Changes in Assessment Throughout Shift: no     Patient has Central Line: no Reasons if yes:    Patient has Michael Cath: no Reasons if yes:       Last Vitals:     Vitals:    08/11/17 1415 08/11/17 1507 08/11/17 1609 08/11/17 1730   BP: 126/48   132/61   Pulse: 77 80  77   Resp: 17 26  18   Temp:   99.1 °F (37.3 °C) 98.4 °F (36.9 °C)   SpO2: 100% 99%     Weight:       Height:            IV and DRAINS (will only show if present)   Peripheral IV 08/11/17 Right; Lower Arm-Site Assessment: Clean, dry, & intact  Peripheral IV 08/11/17 Left; Lower Arm-Site Assessment: Clean, dry, & intact     WOUND (if present)   Wound Type:  none   Dressing present Dressing Present : No   Wound Concerns/Notes:  none     PAIN    Pain Assessment    Pain Intensity 1: 3 (08/11/17 1716)              Patient Stated Pain Goal: 0  o Interventions for Pain:  none  o Intervention effective: no  o Time of last intervention: 1900   o Reassessment Completed: yes      Last 3 Weights:  Last 3 Recorded Weights in this Encounter    08/11/17 0830   Weight: 86.2 kg (190 lb)     Weight change:  INTAKE/OUPUT    Current Shift: 08/11 1901 - 08/12 0700  In: 203.3 [I.V.:203.3]  Out: -     Last three shifts: 08/10 0701 - 08/11 1900  In: 100 [I.V.:100]  Out: 400 [Urine:400]     LAB RESULTS     Recent Labs      08/11/17   1812 08/11/17   0845   WBC  15.1*  14.5*   HGB  12.1*  13.5   HCT  36.6  39.8   PLT  169  161        Recent Labs      08/11/17   1812 08/11/17   0845   NA  142  139   K  3.5  3.2*   GLU  140*  193*   BUN  13  15   CREA  0.79  0.93   CA  8.4*  8.9   MG   --   1.8   INR   --   1.1       RECOMMENDATIONS AND DISCHARGE PLANNING     1. Pending tests/procedures/ Plan of Care or Other Needs: none     2. Discharge plan for patient and Needs/Barriers: tbd    3. Estimated Discharge Date: 8.15 Posted on Whiteboard in Patients Room: no      4. The patient's care plan was reviewed with the oncoming nurse. \"HEALS\" SAFETY CHECK      Fall Risk    Total Score: 6    Safety Measures: Safety Measures: Bed/Chair alarm on, Bed/Chair-Wheels locked, Bed in low position, Call light within reach    A safety check occurred in the patient's room between off going nurse and oncoming nurse listed above. The safety check included the below items  Area Items   H  High Alert Medications - Verify all high alert medication drips (heparin, PCA, etc.)   E  Equipment - Suction is set up for ALL patients (with yanker)  - Red plugs utilized for all equipment (IV pumps, etc.)  - WOWs wiped down at end of shift.  - Room stocked with oxygen, suction, and other unit-specific supplies   A  Alarms - Bed alarm is set for fall risk patients  - Ensure chair alarm is in place and activated if patient is up in a chair   L  Lines - Check IV for any infiltration  - Michael bag is empty if patient has a Michael   - Tubing and IV bags are labeled   S  Safety   - Room is clean, patient is clean, and equipment is clean. - Hallways are clear from equipment besides carts.    - Fall bracelet on for fall risk patients  - Ensure room is clear and free of clutter  - Suction is set up for ALL patients (with claire)  - Hallways are clear from equipment besides carts.    - Isolation precautions followed, supplies available outside room, sign posted     Queta Padron RN

## 2017-08-11 NOTE — ED TRIAGE NOTES
Patient brought from home by EMS s/p \"not acting right\". No family at bedside and patient unable to answer questions.

## 2017-08-11 NOTE — IP AVS SNAPSHOT
Bernie Baca 
 
 
 [address] Patient: Willow Walker. MRN: [id number] :1935 Current Discharge Medication List  
  
START taking these medications Dose & Instructions Dispensing Information Comments Morning Noon Evening Bedtime  
 levoFLOXacin 750 mg tablet Commonly known as:  Juanita Lieu Your last dose was: Your next dose is:    
   
   
 Dose:  750 mg Take 1 Tab by mouth daily. Quantity:  7 Tab Refills:  0 CONTINUE these medications which have NOT CHANGED Dose & Instructions Dispensing Information Comments Morning Noon Evening Bedtime  
 allopurinol 300 mg tablet Commonly known as:  Chardeon Sumanth Your last dose was: Your next dose is: TAKE 1 TABLET BY MOUTH ONCE DAILY Quantity:  90 Tab Refills:  3  
     
   
   
   
  
 aspirin delayed-release 81 mg tablet Your last dose was: Your next dose is:    
   
   
 Dose:  81 mg Take 81 mg by mouth two (2) times a day. Refills:  0 CENTRUM SILVER PO Your last dose was: Your next dose is:    
   
   
 Dose:  1 Tab Take 1 Tab by mouth daily. Refills:  0  
     
   
   
   
  
 donepezil 10 mg tablet Commonly known as:  ARICEPT Your last dose was: Your next dose is:    
   
   
 Dose:  10 mg Take 10 mg by mouth nightly. Refills:  0  
     
   
   
   
  
 indapamide 1.25 mg tablet Commonly known as:  Jaxson Ball Your last dose was: Your next dose is: TAKE 1 TABLET BY MOUTH ONCE DAILY Quantity:  30 Tab Refills:  2  
     
   
   
   
  
 metoprolol tartrate 50 mg tablet Commonly known as:  LOPRESSOR Your last dose was: Your next dose is: TAKE 1 TABLET BY MOUTH 2 TIMES A DAY Quantity:  60 Tab Refills:  6 mirtazapine 15 mg disintegrating tablet Commonly known as:  REMERON SOL-TAB Your last dose was: Your next dose is:    
   
   
 1 tablet at bedtime Quantity:  30 Tab Refills:  3  
     
   
   
   
  
 naloxone 4 mg/actuation Spry Commonly known as:  [employer] Your last dose was: Your next dose is:    
   
   
 Use 1 spray intranasally into 1 nostril. Quantity:  1 Box Refills:  0  
     
   
   
   
  
 niacin 1,000 mg Tb24 tab Commonly known as:  Keith Glance Your last dose was: Your next dose is:    
   
   
 Dose:  1000 mg Take 1 tablet by mouth nightly. Quantity:  30 tablet Refills:  11  
     
   
   
   
  
 NITROSTAT 0.4 mg SL tablet Generic drug:  nitroglycerin Your last dose was: Your next dose is:    
   
   
 DISSOLVE 1 TABLET UNDER THE TONGUE EVERY 5 MINUTES AS NEEDED Quantity:  25 Tab Refills:  0  
     
   
   
   
  
 oxyCODONE ER 20 mg ER tablet Commonly known as:  OxyCONTIN Your last dose was: Your next dose is:    
   
   
 Dose:  20 mg Take 1 Tab by mouth every twelve (12) hours. Max Daily Amount: 40 mg. Indications: Chronic Pain Quantity:  60 Tab Refills:  0  
     
   
   
   
  
 oxyCODONE-acetaminophen  mg per tablet Commonly known as:  PERCOCET 10 Your last dose was: Your next dose is:    
   
   
 Dose:  1 Tab Take 1 Tab by mouth every eight (8) hours as needed (for Breakthrough pain). Max Daily Amount: 3 Tabs. Indications: Pain Quantity:  90 Tab Refills:  0  
     
   
   
   
  
 potassium chloride 10 mEq tablet Commonly known as:  KLOR-CON Your last dose was: Your next dose is: TAKE 1 TABLET BY MOUTH 2 TIMES A DAY Quantity:  60 Tab Refills:  1  
     
   
   
   
  
 simvastatin 40 mg tablet Commonly known as:  ZOCOR Your last dose was: Your next dose is: TAKE 1 TABLET BY MOUTH ONCE DAILY Quantity:  30 Tab Refills:  6 ASK your doctor about these medications Dose & Instructions Dispensing Information Comments Morning Noon Evening Bedtime NAMENDA XR 28 mg capsule Generic drug:  memantine ER Your last dose was: Your next dose is:    
   
   
 Dose:  28 mg Take 28 mg by mouth daily. Refills:  1 Where to Get Your Medications Information on where to get these meds will be given to you by the nurse or doctor. ! Ask your nurse or doctor about these medications  
  levoFLOXacin 750 mg tablet

## 2017-08-11 NOTE — IP AVS SNAPSHOT
[address] Patient: Roro Prasad. MRN: [id number] :1935 You are allergic to the following No active allergies Recent Documentation Height Weight BMI Smoking Status 1.803 m 92.7 kg 28.49 kg/m2 Never Smoker Unresulted Labs Order Current Status CULTURE, BLOOD Preliminary result CULTURE, BLOOD Preliminary result Emergency Contacts Name Discharge Info Relation Home Work Mobile [facility] DISCHARGE CAREGIVER [3] Spouse [3] [phone] About your hospitalization You were admitted on:  2017 You last received care in the:  [facility] [address] You were discharged on:  2017 Unit phone number:  [phone] Why you were hospitalized Your primary diagnosis was:  Cap (Community Acquired Pneumonia) Your diagnoses also included:  Hypokalemia, Lung Nodule, Severe Sepsis (Hcc), Acute Encephalopathy, Liver Nodule, Prostate Enlargement, Calculus Of Gallbladder Without Cholecystitis Providers Seen During Your Hospitalizations Provider Role Specialty Primary office phone Zohaib Price MD Attending Provider Emergency Medicine [phone] Melinda Cartagena MD Attending Provider Internal Medicine [phone] Reid Olmedo MD Attending Provider [facility] [phone] Maria R Cunningham MD Attending Provider [facility] [phone] Your Primary Care Physician (PCP) Primary Care Physician Office Phone Office Fax [phone], [address] [place] [address] [phone] Follow-up Information Follow up With Details Comments Contact Info Dawna Alvarado MD Go on 2017 @ 12:45 [facility] [address] [address] 
[phone] Your Appointments  12:45 PM EDT TRANSITIONAL CARE MANAGEMENT with Mary Spencer NP  
 [facility] [place] [address], [address] [facility] [address]  
[phone] Wednesday August 30, 2017  1:45 PM EDT Follow Up with Fadi Beatty MD  
[facility]) [address] [facility] [address]  
[phone] Current Discharge Medication List  
  
START taking these medications Dose & Instructions Dispensing Information Comments Morning Noon Evening Bedtime  
 levoFLOXacin 750 mg tablet Commonly known as:  Marti Patterson Your last dose was: Your next dose is:    
   
   
 Dose:  750 mg Take 1 Tab by mouth daily. Quantity:  7 Tab Refills:  0 CONTINUE these medications which have NOT CHANGED Dose & Instructions Dispensing Information Comments Morning Noon Evening Bedtime  
 allopurinol 300 mg tablet Commonly known as:  India Cuello Your last dose was: Your next dose is: TAKE 1 TABLET BY MOUTH ONCE DAILY Quantity:  90 Tab Refills:  3  
     
   
   
   
  
 aspirin delayed-release 81 mg tablet Your last dose was: Your next dose is:    
   
   
 Dose:  81 mg Take 81 mg by mouth two (2) times a day. Refills:  0 CENTRUM SILVER PO Your last dose was: Your next dose is:    
   
   
 Dose:  1 Tab Take 1 Tab by mouth daily. Refills:  0  
     
   
   
   
  
 donepezil 10 mg tablet Commonly known as:  ARICEPT Your last dose was: Your next dose is:    
   
   
 Dose:  10 mg Take 10 mg by mouth nightly. Refills:  0  
     
   
   
   
  
 indapamide 1.25 mg tablet Commonly known as:  Chelsea Karen Your last dose was: Your next dose is: TAKE 1 TABLET BY MOUTH ONCE DAILY Quantity:  30 Tab Refills:  2  
     
   
   
   
  
 metoprolol tartrate 50 mg tablet Commonly known as:  LOPRESSOR Your last dose was: Your next dose is: TAKE 1 TABLET BY MOUTH 2 TIMES A DAY Quantity:  60 Tab Refills:  6  
     
   
   
   
  
 mirtazapine 15 mg disintegrating tablet Commonly known as:  REMERON SOL-TAB Your last dose was: Your next dose is:    
   
   
 1 tablet at bedtime Quantity:  30 Tab Refills:  3  
     
   
   
   
  
 naloxone 4 mg/actuation Spry Commonly known as:  [employer] Your last dose was: Your next dose is:    
   
   
 Use 1 spray intranasally into 1 nostril. Quantity:  1 Box Refills:  0  
     
   
   
   
  
 niacin 1,000 mg Tb24 tab Commonly known as:  Germania Corado Your last dose was: Your next dose is:    
   
   
 Dose:  1000 mg Take 1 tablet by mouth nightly. Quantity:  30 tablet Refills:  11  
     
   
   
   
  
 NITROSTAT 0.4 mg SL tablet Generic drug:  nitroglycerin Your last dose was: Your next dose is:    
   
   
 DISSOLVE 1 TABLET UNDER THE TONGUE EVERY 5 MINUTES AS NEEDED Quantity:  25 Tab Refills:  0  
     
   
   
   
  
 oxyCODONE ER 20 mg ER tablet Commonly known as:  OxyCONTIN Your last dose was: Your next dose is:    
   
   
 Dose:  20 mg Take 1 Tab by mouth every twelve (12) hours. Max Daily Amount: 40 mg. Indications: Chronic Pain Quantity:  60 Tab Refills:  0  
     
   
   
   
  
 oxyCODONE-acetaminophen  mg per tablet Commonly known as:  PERCOCET 10 Your last dose was: Your next dose is:    
   
   
 Dose:  1 Tab Take 1 Tab by mouth every eight (8) hours as needed (for Breakthrough pain). Max Daily Amount: 3 Tabs. Indications: Pain Quantity:  90 Tab Refills:  0  
     
   
   
   
  
 potassium chloride 10 mEq tablet Commonly known as:  KLOR-CON Your last dose was: Your next dose is: TAKE 1 TABLET BY MOUTH 2 TIMES A DAY Quantity:  60 Tab Refills:  1  
     
   
   
   
  
 simvastatin 40 mg tablet Commonly known as:  ZOCOR Your last dose was: Your next dose is: TAKE 1 TABLET BY MOUTH ONCE DAILY Quantity:  30 Tab Refills:  6 ASK your doctor about these medications Dose & Instructions Dispensing Information Comments Morning Noon Evening Bedtime NAMENDA XR 28 mg capsule Generic drug:  memantine ER Your last dose was: Your next dose is:    
   
   
 Dose:  28 mg Take 28 mg by mouth daily. Refills:  1 Where to Get Your Medications Information on where to get these meds will be given to you by the nurse or doctor. ! Ask your nurse or doctor about these medications  
  levoFLOXacin 750 mg tablet Discharge Instructions None Discharge Orders None ACO Transitions of Care Introducing [employer] [address] offers a voluntary care coordination program to provide high quality service and care to [facility] fee-for-service beneficiaries. Misty Tello was designed to help you enhance your health and well-being through the following services: ? Transitions of Care  support for individuals who are transitioning from one care setting to another (example: Hospital to home). ? Chronic and Complex Care Coordination  support for individuals and caregivers of those with serious or chronic illnesses or with more than one chronic (ongoing) condition and those who take a number of different medications. If you meet specific medical criteria, a [address] [place] [address] Rd may call you directly to coordinate your care with your primary care physician and your other care providers. For questions about the [facility] programs, please, contact your physicians office. For general questions or additional information about Accountable Care Organizations: 
Please visit www.medicare.gov/acos. html or call 1-800-MEDICARE (3-227.637.1584) TTY users should call 0-137.390.2185. [employer] Announcement We are excited to announce that we are making your provider's discharge notes available to you in [employer]. You will see these notes when they are completed and signed by the physician that discharged you from your recent hospital stay. If you have any questions or concerns about any information you see in [employer], please call the Health Information Department where you were seen or reach out to your Primary Care Provider for more information about your plan of care. Introducing [facility]! Dalia Lemon introduces [employer] patient portal. Now you can access parts of your medical record, email your doctor's office, and request medication refills online. 1. In your internet browser, go to [email or web address] 2. Click on the First Time User? Click Here link in the Sign In box. You will see the New Member Sign Up page. 3. Enter your [employer] Access Code exactly as it appears below. You will not need to use this code after youve completed the sign-up process. If you do not sign up before the expiration date, you must request a new code. · [employer] Access Code: [email or web address] Expires: 11/12/2017  5:34 PM 
 
4. Enter the last four digits of your Social Security Number (xxxx) and Date of Birth (mm/dd/yyyy) as indicated and click Submit. You will be taken to the next sign-up page. 5. Create a [employer]ios ID. This will be your [employer] login ID and cannot be changed, so think of one that is secure and easy to remember. 6. Create a [employer] password. You can change your password at any time. 7. Enter your Password Reset Question and Answer. This can be used at a later time if you forget your password. 8. Enter your e-mail address. You will receive e-mail notification when new information is available in [address] 9. Click Sign Up. You can now view and download portions of your medical record. 10. Click the Download Summary menu link to download a portable copy of your medical information. If you have questions, please visit the Frequently Asked Questions section of the [employer] website. Remember, [employer] is NOT to be used for urgent needs. For medical emergencies, dial 911. Now available from your iPhone and Android! General Information Please provide this summary of care documentation to your next provider. Patient Signature:  ____________________________________________________________ Date:  ____________________________________________________________  
  
[facility] Provider Signature:  ____________________________________________________________ Date:  ____________________________________________________________

## 2017-08-12 NOTE — PROGRESS NOTES
Bedside and Verbal shift change report given to Opal Hurley RN  (oncoming nurse) by Joana Santillan LPN (offgoing nurse).  Report included the following information SBAR, Kardex, Intake/Output and MAR. ]

## 2017-08-12 NOTE — PROGRESS NOTES
Intern Progress Note  [facility]       Patient: Shanti Cohen. MRN: [id number]  CSN: [id number]    YOB: 1935  Age: 80 y.o. Sex: male    DOA: 8/11/2017 LOS:  LOS: 1 day                    Subjective:      80 y.o. male with a PMHx of CAD, CABG x4, diverticulitis, spinal stenosis, periumbilical hernia s/p repair w/ mesh, gout, CVA in 2014, Type II DM2, and HTN, now admitted with sepsis of unknown etiology likely 2/2 CAP. Acute events:  - None, pt resting comfortably in bed this am   - A&O X2, knows name, age, not location   - complains of back pain at rest, worsening with any movement   - Pt received lidoderm patch last night, but no other pain medications   - RN states pt's wife has requested sitter for pt for tonight due to continued confusion   - going for CT chest this am       Review of Systems:  Review of Systems   Constitutional: Positive for chills and diaphoresis. Negative for fever. Respiratory: Negative for cough, sputum production, shortness of breath and wheezing. Cardiovascular: Negative for chest pain and leg swelling. Gastrointestinal: Negative for abdominal pain, constipation, diarrhea, nausea and vomiting. Musculoskeletal: Positive for back pain and myalgias. Skin: Negative for rash. Neurological: Positive for weakness. Negative for dizziness and headaches. Psychiatric/Behavioral: Positive for memory loss.        Objective:      Patient Vitals for the past 24 hrs:   Temp Pulse Resp BP SpO2   08/11/17 2056 98 °F (36.7 °C) 67 - 105/58 95 %   08/11/17 1730 98.4 °F (36.9 °C) 77 18 132/61 -   08/11/17 1609 99.1 °F (37.3 °C) - - - -   08/11/17 1507 - 80 26 - 99 %   08/11/17 1415 - 77 17 126/48 100 %   08/11/17 1405 - 76 29 - 100 %   08/11/17 1400 - 74 21 114/47 100 %   08/11/17 1345 - 71 24 129/51 99 %   08/11/17 1330 - 73 19 109/57 99 %   08/11/17 1315 - 79 19 124/54 98 %   08/11/17 1245 - 74 23 123/55 99 %   08/11/17 1230 - 70 23 131/65 99 % 08/11/17 1215 - 73 17 147/69 100 %   08/11/17 1200 - 75 21 96/51 97 %   08/11/17 1145 - 76 22 119/42 96 %   08/11/17 1130 - 73 20 107/50 97 %   08/11/17 1115 - 76 20 108/51 99 %   08/11/17 1111 - 83 11 113/52 100 %   08/11/17 0945 - 79 19 127/56 98 %   08/11/17 0830 (!) 101.5 °F (38.6 °C) 86 25 134/55 95 %         Intake/Output Summary (Last 24 hours) at 08/12/17 0106  Last data filed at 08/11/17 1950   Gross per 24 hour   Intake           303.33 ml   Output              400 ml   Net           -96.67 ml         Physical Exam:   General: healthy, cooperative, pale, uncomfortable and diaphoretic   Head: NC/AT  Cardiovascular: RRR s MRGs  Respiratory: CTAB s rales, rhonchi, wheezes  Abdomen: Large midline surgical scar, Soft, +BS, non-TTP, Non-Distended  Extremities: No edema in lower extremities bilaterally, 2+ radial pulses intact bilaterally  Neuro: grossly moving upper and lower extremities, A&OX2   Skin: Negative for lesions, ulcers, rashes    Lab/Data Review:  CBC w/Diff Recent Labs      08/11/17 1812 08/11/17   0845   WBC  15.1*  14.5*   RBC  3.96*  4.31*   HGB  12.1*  13.5   HCT  36.6  39.8   PLT  169  161   GRANS  85*  77*   LYMPH  10*  7*   EOS  0  0      Chemistry Recent Labs      08/11/17 1812  08/11/17   0845   GLU  140*  193*   NA  142  139   K  3.5  3.2*   CL  106  102   CO2  29  30   BUN  13  15   CREA  0.79  0.93   CA  8.4*  8.9   MG   --   1.8   AGAP  7  7   BUCR  16  16   AP   --   67   TP   --   6.9   ALB   --   3.4   GLOB   --   3.5   AGRAT   --   1.0        Microbiology No results for input(s): SDES, CULT in the last 72 hours. No results for input(s): CULT in the last 72 hours.      Scheduled Medications Reviewed:  Current Facility-Administered Medications   Medication Dose Route Frequency    piperacillin-tazobactam (ZOSYN) 4.5 g in 0.9% sodium chloride (MBP/ADV) 100 mL MBP  4.5 g IntraVENous Q6H    [START ON 8/13/2017] Vancomycin trough level  1 Each Other ONCE    vancomycin (VANCOCIN) 1,250 mg in 0.9% sodium chloride 250 mL IVPB  1,250 mg IntraVENous Q12H    allopurinol (ZYLOPRIM) tablet 300 mg  300 mg Oral DAILY    aspirin delayed-release tablet 81 mg  81 mg Oral BID    potassium chloride (KLOR-CON) tablet 10 mEq  10 mEq Oral DAILY    simvastatin (ZOCOR) tablet 40 mg  40 mg Oral QHS    0.9% sodium chloride with KCl 20 mEq/L infusion   IntraVENous CONTINUOUS       Imaging, microbiology, and EKG/Telemetry:    CT Abd Pelv W Cont 8/11/2017   IMPRESSION:     1. Infiltrates in the lung bases. Potentially indicative of developing  infectious process vs. nonspecific inflammatory changes.       - Right lung base with well-formed appearing nodules. Recommend CT of the chest  for further delineation/ establish baseline for future follow-up.     2. Cholelithiasis with distended gallbladder.     3.  Liver nodule. Multiphasic CT or MR may be considered for further  delineation. Ultrasound may be an option as well, to determine if this liver  lesion is cystic. Should be amenable to ultrasound detection based on its  superficial location. The same ultrasound can be used to delineate the  gallbladder findings as well.     4.  Splenic-systemic venous collateral channel. This may be an indication of  developing portal hypertension.     5.  Prior short segment sigmoid resection with side-to-end anastomosis. Stool  retention in colon, particularly in the blind looped portion of the sigmoid.       6.  Prostate enlargement with abnormal left seminal vesicle nodular enlargement. Please correlate with serial PSA.       Assessment and Plan:     80 y.o. male with a PMHx of CAD, CABG x4, diverticulitis, spinal stenosis, periumbilical hernia s/p repair w/ mesh, gout, CVA in 2014, Type II DM2, and HTN, now admitted with sepsis of unknown etiology.     Acute encephalopathy/Sepsis of unknown etiology- Likely 2/2 CAP, bilateral infiltrates seen in lung bases on CT Abd Pelvis; 2/4 SIRS criteria on admission: WBC: 14.5, Temp to 101.5, Head CT negative, UA negative  -- WBC's 14.5>10.4   -- Vancomycin, Zosyn discontinued today   -- Levaquin 750 IV daily   - IV NS with KCL 20 mEq/L at 100 mL/hr   --Sputum cx, blood cx pending  --Neuro checks q4h  -- Hold home mirtazapine  --Hold home pain meds until pt's mental status improves  - pro-BNP 1227      Lung nodule: 0.6cm nodule in R mid lobe, 1.0cm nodule in R lower lobe, 3.1 x 1.3 cm node in subcarinal region  --Pulm called, said to call pulm consult on Monday if not ICU status  - CT chest w/o contrast pending     Cholelithiasis with gallbladder distension   - Surgery consult     Liver nodule- 1.2 X 1.0 cm in R lobe of liver seen on CT Abd pelv  - RUQ US pending     Prostate enlargement- Seen on CT Abd Pelv   - PSA pending      Hypokalemia: K: 3.2 on admission, normal sinus rhythm on EKG  --Continue home med: Klor-con 10mEq daily    CAD, HTN, HLD   - followed by [facility]. [facility]ix [facility]   - last heart cath 3/15/2017 showed moderate diffuse disease without critical stenosis, continued medical therapy rec'd  - Continue home meds- Metoprolol 50 mg BID, Simvastatin 40 mg qhs, ASA 81 mg daily  - NTG 0.4 mg sublingual PRN        Type II DM/HTN/HLD: Per wife, patient was diagnosed after CVA, lost weight, and has resolved. --HbA1c 6.7  --accuchecks AC & HS    Hx of Gout   - Continue home Allopurinol 300 mg daily     Hx of Spinal stenosis, Chronic pain   - Holding home Percocet  mg, Oxycontin ER 20 mg until mental status improves   - Lidoderm patch daily   - Toradol 15 mg IV q 6hrs PRN         Diet: Diabetic  DVT Prophylaxis: SCDs  Code Status: Full      ZEE Almazan DO   [facility]   August 12, 2017

## 2017-08-12 NOTE — PROGRESS NOTES
Surgery  Asked to see pt for abd pain and cholelithiasis  Chart reviewed pt examined  CT images reviewed as where the labs  No sign of Cholecystitis or other biliary tract disease  Available as needed

## 2017-08-12 NOTE — PROGRESS NOTES
MD ordered that pt receive prn Toradol, once pts returns from CT scan. Updated MD on pt overnight status. MD verbalized acknowledgment. Informed MD that the pts family requested a sitter for the pt. MD verbalized acknowledgment.

## 2017-08-12 NOTE — CONSULTS
[address]    Name:  José Manuel Kevin  MR#:  [id number]  :  1935  Account #:  [de-identified]  Date of Adm:  2017  Date of Consultation:  2017      REASON FOR CONSULTATION: Cholelithiasis. HISTORY OF PRESENT ILLNESS: The patient is a 79-year-old  gentleman with a history of a stroke, who presented on 2017  with a change in mental status and increasing somnolence and  changes in speech. This had been worsening for some 3-4 days. When he was brought to the emergency room, he was noted to have a  mildly elevated white count at 15.1 and a temperature of 101. He was  admitted and thought to have sepsis from a pulmonary source. Apparently there was a mildly distended gallbladder and cholelithiasis  read out on the CT scan of his abdomen. I have been asked to see him  for this condition. I have reviewed the CT images and his bladder looks  physiologic to me without evidence of inflammation, thickening, or  distention. He has normalized his white blood cell count to 10.4 and he  has a normal bilirubin of 0.6. He is a minimally responsive at my exam,  but does not have an acute abdomen. In fact, a soft, minimally tender  abdomen. PAST MEDICAL HISTORY: Significant for coronary artery disease and  a CABG, multiple cardiac workups with echo, nuclear imaging and ekg. He has history of stroke, headache, sleep apnea, Meniere's disease,  spinal stenosis, and apparently a ventral hernia. MEDICATIONS AT HOME: He takes  1. OxyContin. 4. Zocor. 5. Klor-Con. 6. Lozol. 7. Percocet. 8. Zyloprim. 9. Lopressor. 10. Nitrostat. 11. Vitamins. 12. Aricept. 13. Niaspan. REVIEW OF SYSTEMS: Not possible due to his mental status. PHYSICAL EXAMINATION  GENERAL: He is a somnolent responsive to stimulus only. HEENT: He is normocephalic, atraumatic. His pupils are equal.  Sclerae are anicteric. His nose and throat are clear. CHEST: Clear bilaterally.   HEART: Regular rate and rhythm. ABDOMEN: Soft, nontender, without guarding or rebound. EXTREMITIES: Warm and dry. NEUROLOGIC: He is somnolent as above. LABORATORY DATA: His latest laboratory data shows a white count  of 10.4, a hemoglobin and hematocrit of 11.9 and 35.8, and a platelet  count of 456. His electrolytes show mild hypernatremia at 146. Mild  hyperchloremia 110 and a glucose of 117. His images on the CT scan  that were as mentioned above. IMPRESSION: No signs of cholecystitis. PLAN: No surgery planned. Will be available as needed.         Jarad Urena MD JR / ALFONSO  D:  08/12/2017   12:36  T:  08/12/2017   13:05  Job #:  [id number]

## 2017-08-12 NOTE — PROGRESS NOTES
Bedside and Verbal shift change report given to Carl Baig (oncoming nurse) by Arielle Cavanaugh RN (offgoing nurse). Report included the following information SBAR, Kardex, MAR and Recent Results.     SITUATION:   · Code Status: Full Code  · Reason for Admission: Sepsis ([facility] [address]  · CAP (community acquired pneumonia)  · Lung nodule  · Hypokalemia  · Acute encephalopathy  · Sepsis ([facility] [address]     · Hospital day: 0  · Problem List:   [facility] Problems  Date Reviewed: 7/28/2017             Codes Class Noted POA     Hypokalemia ICD-10-CM: E87.6  ICD-9-CM: 276.8   8/11/2017 Unknown           Lung nodule ICD-10-CM: R91.1  ICD-9-CM: 793.11   8/11/2017 Unknown           * (Principal)CAP (community acquired pneumonia) ICD-10-CM: J18.9  ICD-9-CM: 071   8/11/2017 Unknown           Sepsis ([facility] [address] ICD-10-CM: A41.9  ICD-9-CM: 038.9, 995.91   8/11/2017 Unknown           Acute encephalopathy ICD-10-CM: G93.40  ICD-9-CM: 348.30   8/11/2017 Unknown                  BACKGROUND:                         Past Medical History:        Past Medical History:   Diagnosis Date    3-vessel coronary artery disease 02/13/2004     Tech limited. Low normal to mildly depressed LV systolic function. LVH. DDfx. LAE. PASP 25-30.  Acute bronchitis      CABG (coronary artery bypass graft) 04/14/1994     LIMA-LAD, Double Seq - D and Cx and SVG- RCA    CAD (coronary artery disease)       acute coronary syndrome    Cardiac catheterization 06/20/2012     LM patent. mLAD 100%. CX diffuse nonobstructive. OM1 patent stent. pRCA 100%. Dbl seq SVG to D1, OM1 100%. SVG to pRPDA 99% (3.5 x 23-mm Xience stent). Patent mid segment stent. LIMA to LAD ok.  Cardiac echocardiogram 11/16/2014     [facility]:  EF 55-60%. DDfx. Normal RVSP. Mild TR. Neg bubble study for atrial shunt. No significant valvular pathology.  Cardiac nuclear imaging test, high risk 06/15/2012     Lg area of mod mid to distal anterior ischemia. Lg area of inferior ischemia.   EF 57%.  Mild mid-distal anterior, inferior hypk. High risk pharm stress test.    Cervical spine pain      Deviated septum       s/p corrective surgery 7/1/2010    Disc disease, degenerative, cervical       joint pain, back pain    Dysphagia, unspecified      Essential hypertension, benign      Gout with other specified manifestations      Head injury       frequent headaches    Hypercholesterolemia      Insomnia, unspecified      Lumbar canal stenosis      Meniere's disease      Myocardial infarction, anterior wall (HCC)      DIEGO (obstructive sleep apnea)       on CPAP    S/P diskectomy 7/5/07     cervical    Spinal stenosis, lumbar region, without neurogenic claudication      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      Unspecified sinusitis (chronic)      Ventral hernia, unspecified, without mention of obstruction or gangrene       S/p repair 6/2003                                Patient taking anticoagulants no      ASSESSMENT:   ¨ Changes in Assessment Throughout Shift: no     ¨ Patient has Central Line: no Reasons if yes:   ¨ Patient has Michael Cath: no Reasons if yes:       ¨ Last Vitals:             Vitals:     08/11/17 1415 08/11/17 1507 08/11/17 1609 08/11/17 1730   BP: 126/48     132/61   Pulse: 77 80   77   Resp: 17 26   18   Temp:     99.1 °F (37.3 °C) 98.4 °F (36.9 °C)   SpO2: 100% 99%       Weight:           Height:                 ¨ IV and DRAINS (will only show if present)                        Peripheral IV 08/11/17 Right; Lower Arm-Site Assessment: Clean, dry, & intact  Peripheral IV 08/11/17 Left; Lower Arm-Site Assessment: Clean, dry, & intact     ¨ WOUND (if present)                        Wound Type:  none                        Dressing present Dressing Present : No                        Wound Concerns/Notes:  none     · PAIN                                              Pain Assessment                                              Pain Intensity 1: 3 (08/11/17 [id number])                                                                                                                                            Patient Stated Pain Goal: 0  ¨ Interventions for Pain:  none  ¨ Intervention effective: no  ¨ Time of last intervention: 1900   ¨ Reassessment Completed: yes      · Last 3 Weights:      Last 3 Recorded Weights in this Encounter     08/11/17 0830   Weight: 86.2 kg (190 lb)      Weight change:      · INTAKE/OUPUT                                              Current Shift: 08/11 1901 - 08/12 0700  In: 203.3 [I.V.:203.3]  Out: -                                               Last three shifts: 08/10 0701 - 08/11 1900  In: 100 [I.V.:100]  Out: 400 [Urine:400]     · LAB RESULTS           Recent Labs       08/11/17   1812 08/11/17   0845   WBC  15.1*  14.5*   HGB  12.1*  13.5   HCT  36.6  39.8   PLT  169  161               Recent Labs       08/11/17   1812 08/11/17   0845   NA  142  139   K  3.5  3.2*   GLU  140*  193*   BUN  13  15   CREA  0.79  0.93   CA  8.4*  8.9   MG   --   1.8   INR   --   1.1         RECOMMENDATIONS AND DISCHARGE PLANNING      1. Pending tests/procedures/ Plan of Care or Other Needs: none      2. Discharge plan for patient and Needs/Barriers: tbd     3. Estimated Discharge Date: 8.15 Posted on Whiteboard in Patients Room: no       4.  The patient's care plan was reviewed with the oncoming nurse.       \"HEALS\" SAFETY CHECK                            Fall Risk                         Total Score: 6                         Safety Measures: Safety Measures: Bed/Chair alarm on, Bed/Chair-Wheels locked, Bed in low position, Call light within reach     A safety check occurred in the patient's room between off going nurse and oncoming nurse listed above.     The safety check included the below items  Area Items   H  High Alert Medications § Verify all high alert medication drips (heparin, PCA, etc.)   E  Equipment § Suction is set up for ALL patients (with claire)  § Red plugs utilized for all equipment (IV pumps, etc.)  § WOWs wiped down at end of shift. § Room stocked with oxygen, suction, and other unit-specific supplies   A  Alarms § Bed alarm is set for fall risk patients  § Ensure chair alarm is in place and activated if patient is up in a chair   L  Lines § Check IV for any infiltration  § Michael bag is empty if patient has a Michael   § Tubing and IV bags are labeled   S  Safety § Room is clean, patient is clean, and equipment is clean. § Hallways are clear from equipment besides carts. § Fall bracelet on for fall risk patients  § Ensure room is clear and free of clutter  § Suction is set up for ALL patients (with claire)  § Hallways are clear from equipment besides carts.    § Isolation precautions followed, supplies available outside room, sign posted

## 2017-08-13 NOTE — PROGRESS NOTES
Problem: Mobility Impaired (Adult and Pediatric)  Goal: *Acute Goals and Plan of Care (Insert Text)  Physical Therapy Goals  Initiated 8/13/2017 and to be accomplished within 3 day(s)  1. Patient will move from supine to sit and sit to supine , scoot up and down and roll side to side in bed with supervision/set-up. 2. Patient will transfer from bed to chair and chair to bed with supervision/set-up using the least restrictive device. 3. Patient will perform sit to stand with supervision/set-up. 4. Patient will ambulate with supervision/set-up for >100 feet with the least restrictive device. 5. Patient will ascend/descend 3 stairs with handrail(s) with supervision/set-up. Outcome: Progressing Towards Goal  PHYSICAL THERAPY EVALUATION     Patient: Suze Carter. (77 y.o. male)  Date: 8/13/2017  Primary Diagnosis: Sepsis ([facility] [address]  CAP (community acquired pneumonia)  Lung nodule  Hypokalemia  Acute encephalopathy  Sepsis ([facility] [address]  Precautions: Fall      ASSESSMENT :  Based on the objective data described below, the patient presents with impaired functional mobility including bed mobility, transfers, ambulation, and general activity tolerance following admission for pneumonia. Patient presents today supine in bed with HOB elevated, drowsy but agreeable to PT evaluation, patient's wife in room and able to assist with subjective history as patient demonstrated difficulty keeping eyes open initially. Patient has PMHx significant for MI with CABG X 4, CVA with resultant dysarthria/visual impairments. Patient able to transfer to sitting on EOB with Pam, assist required as bed in locked position and unable to be flattened appropriately. Upon sitting, patient demonstrated increased attentiveness to task. He transferred to standing with SBA using RW, no c/o dizziness with position change. He ambulated ~30 ft with RW and supervision for safety, demonstrates minimal reliance on assistive device.  Patient would benefit from trials with SPC and without assistive device as appropriate. Patient returned to supine in bed with CGA, left with wife in room, call bell in reach, and nurse notified. Patient will benefit from 3 day trial of skilled intervention to address the above impairments. Patients rehabilitation potential is considered to be Good  Factors which may influence rehabilitation potential include:   [ ]         None noted  [X]         Mental ability/status  [X]         Medical condition  [ ]         Home/family situation and support systems  [X]         Safety awareness  [ ]         Pain tolerance/management  [ ]         Other:        PLAN :  Recommendations and Planned Interventions:  [X]           Bed Mobility Training             [X]    Neuromuscular Re-Education  [X]           Transfer Training                   [ ]    Orthotic/Prosthetic Training  [X]           Gait Training                          [ ]    Modalities  [X]           Therapeutic Exercises          [ ]    Edema Management/Control  [X]           Therapeutic Activities            [X]    Patient and Family Training/Education  [ ]           Other (comment):     Frequency/Duration: Patient will be followed by physical therapy 1-2 times per day/4-7 days per week to address goals. Discharge Recommendations: Home Health  Further Equipment Recommendations for Discharge: To be determined       SUBJECTIVE:   Patient stated Can I get back in bed now?       OBJECTIVE DATA SUMMARY:       Past Medical History:   Diagnosis Date    3-vessel coronary artery disease 02/13/2004     Tech limited. Low normal to mildly depressed LV systolic function. LVH. DDfx. LAE. PASP 25-30.  Acute bronchitis      CABG (coronary artery bypass graft) 04/14/1994     LIMA-LAD, Double Seq - D and Cx and SVG- RCA    CAD (coronary artery disease)       acute coronary syndrome    Cardiac catheterization 06/20/2012     LM patent. mLAD 100%. CX diffuse nonobstructive.   OM1 patent stent.  pRCA 100%. Dbl seq SVG to D1, OM1 100%. SVG to pRPDA 99% (3.5 x 23-mm Xience stent). Patent mid segment stent. LIMA to LAD ok.  Cardiac echocardiogram 11/16/2014     [facility]:  EF 55-60%. DDfx. Normal RVSP. Mild TR. Neg bubble study for atrial shunt. No significant valvular pathology.  Cardiac nuclear imaging test, high risk 06/15/2012     Lg area of mod mid to distal anterior ischemia. Lg area of inferior ischemia. EF 57%. Mild mid-distal anterior, inferior hypk.   High risk pharm stress test.    Cervical spine pain      Deviated septum       s/p corrective surgery 7/1/2010    Disc disease, degenerative, cervical       joint pain, back pain    Dysphagia, unspecified      Essential hypertension, benign      Gout with other specified manifestations      Head injury       frequent headaches    Hypercholesterolemia      Insomnia, unspecified      Lumbar canal stenosis      Meniere's disease      Myocardial infarction, anterior wall (HCC)      DIEGO (obstructive sleep apnea)       on CPAP    S/P diskectomy 7/5/07     cervical    Spinal stenosis, lumbar region, without neurogenic claudication      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      Unspecified sinusitis (chronic)      Ventral hernia, unspecified, without mention of obstruction or gangrene       S/p repair 6/2003     Past Surgical History:   Procedure Laterality Date    CARDIAC CATHETERIZATION   3/15/2017          CARDIAC SURG PROCEDURE UNLIST   1/04     angioplasty of a proximal left anterior descending    CARDIAC SURG PROCEDURE UNLIST   6/2/08     angioplasty and stenting of totally obstructed saphenous vein graft to the posterior descending artery    CORONARY ARTERY ANGIOGRAM   3/15/2017          HX APPENDECTOMY        HX CATARACT REMOVAL         bilateral    HX CERVICAL DISKECTOMY   7/07     and fusion    HX CORONARY ARTERY BYPASS GRAFT   4/14/94     X4, with LIMA to the LAD, double sequential to the diagonal and circumflex and single to the main right coronary artery    HX CORONARY STENT PLACEMENT   6/20/2012    HX GI   1992     two feet of colon removed    HX HEART CATHETERIZATION   6/02/08    HX HEART CATHETERIZATION   6/20/2012    HX HEENT   7/1/10     deviated nasal septum surgery    HX HERNIA REPAIR   7/2003     two prior hernia repairs in the past    HX OTHER SURGICAL        HX PTCA   6/20/2012    HX TONSILLECTOMY         as a child    [place] [address] [facility]         for crushed disk     Barriers to Learning/Limitations: yes;  sensory deficits-vision/hearing/speech and altered mental status (i.e.Sedation, Confusion)  Compensate with: Visual Cues, Verbal Cues and Tactile Cues  Prior Level of Function/Home Situation: Patient and wife report patient was independent with ambulation and all I/ADLs prior to hospitalization. Patient has dysarthria and visual impairments from previous CVA. Home Situation  Home Environment: Private residence  # Steps to Enter: 3  Rails to Enter: Yes  Hand Rails : Bilateral  One/Two Story Residence: One story  Living Alone: No  Support Systems: Spouse/Significant Other/Partner, Family member(s)  Patient Expects to be Discharged to[de-identified] Private residence  Current DME Used/Available at Home: None  Critical Behavior:  Neurologic State: Alert;Drowsy  Psychosocial  Patient Behaviors: Calm; Cooperative  Family  Behaviors: Calm; Cooperative (Wife)  Strength:    Strength: Within functional limits (B LE)  Tone & Sensation:   Tone: Normal  Sensation: Intact (B LE to LT)   Range Of Motion:  AROM: Within functional limits (B LE)  Functional Mobility:  Bed Mobility:  Rolling: Supervision  Supine to Sit: Minimum assistance  Sit to Supine: Contact guard assistance  Scooting: Supervision  Transfers:  Sit to Stand: Stand-by asssistance  Stand to Sit: Stand-by asssistance  Balance:   Sitting: Intact; Without support  Standing: Impaired; With support  Standing - Static: Good  Standing - Dynamic : Fair (Fair +)  Ambulation/Gait Training:  Distance (ft): 30 Feet (ft)  Assistive Device: Walker, rolling  Ambulation - Level of Assistance: Supervision  Base of Support: Center of gravity altered  Speed/Sayra: Fluctuations  Pain:  Pain Scale 1: Visual (pt sleeping)   Patient reports chronic stabbing pain in low back  Activity Tolerance:   Fair/good  Please refer to the flowsheet for vital signs taken during this treatment. After treatment:   [ ] Patient left in no apparent distress sitting up in chair  [ ] Patient left sitting on EOB  [X] Patient left in no apparent distress in bed  [ ] Patient declined to be OOB at this time due to  [X] Call bell left within reach  [X] Nursing notified Lexus Camacho RN)  [X] Caregiver present  [ ] Bed alarm activated      COMMUNICATION/EDUCATION:   [X]         Fall prevention education was provided and the patient/caregiver indicated understanding. [X]         Patient/family have participated as able in goal setting and plan of care. [X]         Patient/family agree to work toward stated goals and plan of care. [ ]         Patient understands intent and goals of therapy, but is neutral about his/her participation. [ ]         Patient is unable to participate in goal setting and plan of care. Thank you for this referral.  Martinez Cargo   Time Calculation: 23 mins     Mobility  Current  CJ= 20-39%   Goal  CI= 1-19%. The severity rating is based on the Level of Assistance required for Functional Mobility and ADLs.      Eval Complexity: History: HIGH Complexity :3+ comorbidities / personal factors will impact the outcome/ POC Exam:MEDIUM Complexity : 3 Standardized tests and measures addressing body structure, function, activity limitation and / or participation in recreation  Presentation: MEDIUM Complexity : Evolving with changing characteristics  Overall Complexity:MEDIUM

## 2017-08-13 NOTE — PROGRESS NOTES
Bedside and Verbal shift change report given to [facility]  (oncoming nurse) by Gloria Murry RN (offgoing nurse). Report included the following information SBAR, Kardex, MAR and Recent Results.     SITUATION:   · Code Status: Full Code  · Reason for Admission: Sepsis ([facility] [address]  · CAP (community acquired pneumonia)  · Lung nodule  · Hypokalemia  · Acute encephalopathy  · Sepsis ([facility] [address]     · Hospital day: 0  · Problem List:   [facility] Problems  Date Reviewed: 7/28/2017             Codes Class Noted POA     Hypokalemia ICD-10-CM: E87.6  ICD-9-CM: 276.8   8/11/2017 Unknown           Lung nodule ICD-10-CM: R91.1  ICD-9-CM: 793.11   8/11/2017 Unknown           * (Principal)CAP (community acquired pneumonia) ICD-10-CM: J18.9  ICD-9-CM: 128   8/11/2017 Unknown           Sepsis ([facility] [address] ICD-10-CM: A41.9  ICD-9-CM: 038.9, 995.91   8/11/2017 Unknown           Acute encephalopathy ICD-10-CM: G93.40  ICD-9-CM: 348.30   8/11/2017 Unknown                  BACKGROUND:                         Past Medical History:        Past Medical History:   Diagnosis Date    3-vessel coronary artery disease 02/13/2004     Tech limited. Low normal to mildly depressed LV systolic function. LVH. DDfx. LAE. PASP 25-30.  Acute bronchitis      CABG (coronary artery bypass graft) 04/14/1994     LIMA-LAD, Double Seq - D and Cx and SVG- RCA    CAD (coronary artery disease)       acute coronary syndrome    Cardiac catheterization 06/20/2012     LM patent. mLAD 100%. CX diffuse nonobstructive. OM1 patent stent. pRCA 100%. Dbl seq SVG to D1, OM1 100%. SVG to pRPDA 99% (3.5 x 23-mm Xience stent). Patent mid segment stent. LIMA to LAD ok.  Cardiac echocardiogram 11/16/2014     [facility]:  EF 55-60%. DDfx. Normal RVSP. Mild TR. Neg bubble study for atrial shunt. No significant valvular pathology.  Cardiac nuclear imaging test, high risk 06/15/2012     Lg area of mod mid to distal anterior ischemia. Lg area of inferior ischemia. EF 57%. Mild mid-distal anterior, inferior hypk. High risk pharm stress test.    Cervical spine pain      Deviated septum       s/p corrective surgery 7/1/2010    Disc disease, degenerative, cervical       joint pain, back pain    Dysphagia, unspecified      Essential hypertension, benign      Gout with other specified manifestations      Head injury       frequent headaches    Hypercholesterolemia      Insomnia, unspecified      Lumbar canal stenosis      Meniere's disease      Myocardial infarction, anterior wall (HCC)      DIEGO (obstructive sleep apnea)       on CPAP    S/P diskectomy 7/5/07     cervical    Spinal stenosis, lumbar region, without neurogenic claudication      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      Unspecified sinusitis (chronic)      Ventral hernia, unspecified, without mention of obstruction or gangrene       S/p repair 6/2003                                Patient taking anticoagulants no      ASSESSMENT:   ¨ Changes in Assessment Throughout Shift: no     ¨ Patient has Central Line: no Reasons if yes:   ¨ Patient has Michael Cath: no Reasons if yes:       ¨ Last Vitals:             Vitals:     08/11/17 1415 08/11/17 1507 08/11/17 1609 08/11/17 1730   BP: 126/48     132/61   Pulse: 77 80   77   Resp: 17 26   18   Temp:     99.1 °F (37.3 °C) 98.4 °F (36.9 °C)   SpO2: 100% 99%       Weight:           Height:                 ¨ IV and DRAINS (will only show if present)                        Peripheral IV 08/11/17 Right; Lower Arm-Site Assessment: Clean, dry, & intact  Peripheral IV 08/11/17 Left; Lower Arm-Site Assessment: Clean, dry, & intact     ¨ WOUND (if present)                        Wound Type:  none                        Dressing present Dressing Present : No                        Wound Concerns/Notes:  none     · PAIN                                              Pain Assessment                                              Pain Intensity 1: 3 (08/11/17 [address])                                                                                                                                            Patient Stated Pain Goal: 0  ¨ Interventions for Pain:  none  ¨ Intervention effective: no  ¨ Time of last intervention: 1900   ¨ Reassessment Completed: yes      · Last 3 Weights:      Last 3 Recorded Weights in this Encounter     08/11/17 0830   Weight: 86.2 kg (190 lb)      Weight change:      · INTAKE/OUPUT                                              Current Shift: 08/11 1901 - 08/12 0700  In: 203.3 [I.V.:203.3]  Out: -                                               Last three shifts: 08/10 0701 - 08/11 1900  In: 100 [I.V.:100]  Out: 400 [Urine:400]     · LAB RESULTS           Recent Labs       08/11/17   1812 08/11/17   0845   WBC  15.1*  14.5*   HGB  12.1*  13.5   HCT  36.6  39.8   PLT  169  161               Recent Labs       08/11/17   1812 08/11/17   0845   NA  142  139   K  3.5  3.2*   GLU  140*  193*   BUN  13  15   CREA  0.79  0.93   CA  8.4*  8.9   MG   --   1.8   INR   --   1.1         RECOMMENDATIONS AND DISCHARGE PLANNING      1. Pending tests/procedures/ Plan of Care or Other Needs: none      2. Discharge plan for patient and Needs/Barriers: tbd     3. Estimated Discharge Date: 8.15 Posted on Whiteboard in Patients Room: no       4.  The patient's care plan was reviewed with the oncoming nurse.       \"HEALS\" SAFETY CHECK                            Fall Risk                         Total Score: 6                         Safety Measures: Safety Measures: Bed/Chair alarm on, Bed/Chair-Wheels locked, Bed in low position, Call light within reach     A safety check occurred in the patient's room between off going nurse and oncoming nurse listed above.     The safety check included the below items  Area Items   H  High Alert Medications § Verify all high alert medication drips (heparin, PCA, etc.)   E  Equipment § Suction is set up for ALL patients (with claire)  § Red plugs utilized for all equipment (IV pumps, etc.)  § WOWs wiped down at end of shift. § Room stocked with oxygen, suction, and other unit-specific supplies   A  Alarms § Bed alarm is set for fall risk patients  § Ensure chair alarm is in place and activated if patient is up in a chair   L  Lines § Check IV for any infiltration  § Michael bag is empty if patient has a Michael   § Tubing and IV bags are labeled   S  Safety § Room is clean, patient is clean, and equipment is clean. § Hallways are clear from equipment besides carts. § Fall bracelet on for fall risk patients  § Ensure room is clear and free of clutter  § Suction is set up for ALL patients (with claire)  § Hallways are clear from equipment besides carts.    § Isolation precautions followed, supplies available outside room, sign posted

## 2017-08-13 NOTE — PROGRESS NOTES
Intern Progress Note  [facility]       Patient: Humera Woody. MRN: [id number]  CSN: [id number]    YOB: 1935  Age: 80 y.o. Sex: male    DOA: 8/11/2017 LOS:  LOS: 2 days                    Subjective:     Acute events: Pt resting comfortably in bed. Complaints of back pain- 8/10>4/10 after toradol. Complaints of 4/10 frontal HA. Also some SOB and non productive cough. Review of Systems   Eyes: Negative for blurred vision and double vision. Respiratory: Positive for cough and shortness of breath. Negative for sputum production. Cardiovascular: Negative for chest pain and leg swelling. Gastrointestinal: Negative for abdominal pain, blood in stool, constipation, diarrhea and nausea. Genitourinary: Negative for dysuria, frequency, hematuria and urgency. Musculoskeletal: Positive for back pain. Neurological: Positive for headaches. Negative for dizziness. Objective:      Patient Vitals for the past 24 hrs:   Temp Pulse Resp BP SpO2   08/13/17 0423 97.7 °F (36.5 °C) 64 16 149/72 96 %   08/12/17 2000 98.3 °F (36.8 °C) 73 18 139/66 100 %   08/12/17 1156 97.6 °F (36.4 °C) 64 19 112/59 96 %       No intake or output data in the 24 hours ending 08/13/17 1124    Physical Exam   HENT:   Head: Normocephalic and atraumatic. Eyes: EOM are normal.   Cardiovascular: Normal rate, regular rhythm and normal heart sounds. Pulmonary/Chest: Effort normal and breath sounds normal.   Abdominal: There is tenderness in the right upper quadrant and left lower quadrant. Skin: Skin is warm and dry. Psychiatric: He has a normal mood and affect.  His behavior is normal.   Ext: Bilateral 1+ pitting edema and tenderness in LEs    Lab/Data Reviewed:  Recent Results (from the past 24 hour(s))   VANCOMYCIN, TROUGH    Collection Time: 08/13/17 12:38 AM   Result Value Ref Range    Vancomycin,trough 5.9 (L) 10.0 - 20.0 ug/mL    Reported dose date: 20170812      Reported dose time: 1300      Reported dose: 1250 MG UNITS   CBC WITH AUTOMATED DIFF    Collection Time: 08/13/17 12:38 AM   Result Value Ref Range    WBC 8.5 4.6 - 13.2 K/uL    RBC 3.61 (L) 4.70 - 5.50 M/uL    HGB 11.1 (L) 13.0 - 16.0 g/dL    HCT 32.8 (L) 36.0 - 48.0 %    MCV 90.9 74.0 - 97.0 FL    MCH 30.7 24.0 - 34.0 PG    MCHC 33.8 31.0 - 37.0 g/dL    RDW 13.2 11.6 - 14.5 %    PLATELET 974 172 - 529 K/uL    MPV 10.4 9.2 - 11.8 FL    NEUTROPHILS 76 (H) 40 - 73 %    LYMPHOCYTES 18 (L) 21 - 52 %    MONOCYTES 5 3 - 10 %    EOSINOPHILS 1 0 - 5 %    BASOPHILS 0 0 - 2 %    ABS. NEUTROPHILS 6.4 1.8 - 8.0 K/UL    ABS. LYMPHOCYTES 1.5 0.9 - 3.6 K/UL    ABS. MONOCYTES 0.5 0.05 - 1.2 K/UL    ABS. EOSINOPHILS 0.1 0.0 - 0.4 K/UL    ABS.  BASOPHILS 0.0 0.0 - 0.1 K/UL    DF AUTOMATED     METABOLIC PANEL, BASIC    Collection Time: 08/13/17 12:38 AM   Result Value Ref Range    Sodium 145 136 - 145 mmol/L    Potassium 3.2 (L) 3.5 - 5.5 mmol/L    Chloride 110 (H) 100 - 108 mmol/L    CO2 28 21 - 32 mmol/L    Anion gap 7 3.0 - 18 mmol/L    Glucose 108 (H) 74 - 99 mg/dL    BUN 15 7.0 - 18 MG/DL    Creatinine 0.63 0.6 - 1.3 MG/DL    BUN/Creatinine ratio 24 (H) 12 - 20      GFR est AA >60 >60 ml/min/1.73m2    GFR est non-AA >60 >60 ml/min/1.73m2    Calcium 8.0 (L) 8.5 - 10.1 MG/DL        Scheduled Medications Reviewed:  Current Facility-Administered Medications   Medication Dose Route Frequency    levoFLOXacin (LEVAQUIN) 750 mg in D5W IVPB  750 mg IntraVENous Q24H    allopurinol (ZYLOPRIM) tablet 300 mg  300 mg Oral DAILY    aspirin delayed-release tablet 81 mg  81 mg Oral BID    potassium chloride (KLOR-CON) tablet 10 mEq  10 mEq Oral DAILY    simvastatin (ZOCOR) tablet 40 mg  40 mg Oral QHS    0.9% sodium chloride with KCl 20 mEq/L infusion   IntraVENous CONTINUOUS         Imaging, microbiology, and EKG/Telemetry:  Blood cx: NGTD    Assessment/Plan     80 y.o. male with a PMHx of CAD, CABG x4, diverticulitis, spinal stenosis, periumbilical hernia s/p repair w/ mesh, gout, CVA in 2014, Type II DM2, and HTN, now admitted with sepsis of unknown etiology.      Acute encephalopathy/Sepsis of unknown etiology- Likely 2/2 CAP, bilateral infiltrates seen in lung bases on CT Abd Pelvis; 2/4 SIRS criteria on admission: WBC: 14.5>15.1>10.4, afebrile since admission, Head CT negative, UA negative, blood cx negative   -- Levaquin 750 IV daily   --Neuro checks q4h  -- Hold home mirtazapine  --Restarting percoset for back pain  - pro-BNP 1227       Lung nodule: 0.6cm nodule in R mid lobe, 1.0cm nodule in R lower lobe, 3.1 x 1.3 cm node in subcarinal region  --Pulm called, said to call pulm consult on Monday if not ICU status  - CT chest w/o contrast pending      Cholelithiasis with gallbladder distension   - Surgery consulted- no surgery planned     Liver nodule- 1.2 X 1.0 cm in R lobe of liver seen on CT Abd pelv  - RUQ US pending      Prostate enlargement- Seen on CT Abd Pelv   - PSA 2.2      Hypokalemia: K: 3.2 on admission, normal sinus rhythm on EKG  --Continue home med: Klor-con 10mEq daily     CAD, HTN, HLD   - followed by Dr. Indio Thurston of [facility]   - last heart cath 3/15/2017 showed moderate diffuse disease without critical stenosis, continued medical therapy rec'd  - Continue home meds- Metoprolol 50 mg BID, Simvastatin 40 mg qhs, ASA 81 mg daily  - NTG 0.4 mg sublingual PRN         Type II DM/HTN/HLD: Per wife, patient was diagnosed after CVA, lost weight, and has resolved.   --HbA1c 6.7  --accuchecks AC & HS     Hx of Gout   - Continue home Allopurinol 300 mg daily      Hx of Spinal stenosis, Chronic pain   - Holding Oxycontin ER 20 mg, restarted home percoset at 5/325 q4h PRN   - Lidoderm patch daily   - Toradol 15 mg IV q 6hrs PRN           Diet: Diabetic  DVT Prophylaxis: SCDs  Code Status: Full    Acequiaangel Mcintosh MD, PGY-1  [facility]  08/13/17 11:24 AM

## 2017-08-13 NOTE — PROGRESS NOTES
This writer met with pt and his wife who was at bedside. Pt who presents with confusion, required explaination from his wife. Wife is also the person who sign the pt's [address] for [facility] for admission. Family requested a private room if possible, at the facility. Pt was placed in e-discharge for acceptance. The plan is for discharge on 8/14/17 pt's wife is aware and would like to be called when transportation is set-up so that she can meet this pt at the facility when he arrives. Susannah Chavez- [phone].

## 2017-08-13 NOTE — PROGRESS NOTES
Care Management Interventions  PCP Verified by CM: Yes (DR. Tawny Hansen)  Palliative Care Consult (Criteria: CHF and RRAT>21): No  Reason for No Palliative Care Consult: Other (see comment) (NO ORDER)  Mode of Transport at Discharge: BLS (PT PLANS TO BE TRANSPORTED TO SNF IF ACCEPTED)  Current Support Network: Lives with Spouse, Own Home, Family Lives Nearby  Confirm Follow Up Transport: Family  Plan discussed with Pt/Family/Caregiver: Yes (PT LIVES WITH HIS WIFE AND HAS A SUPPORTIVE FAMILY NEAR BY. PT PLAN AT THIS TIME IS A SHORT REHAB PERIOD FOR THIS PT.)  Freedom of Choice Offered: Yes ([facility] for rehab)  Discharge Location  Discharge Placement: Skilled nursing facility   Pt is a 80year old male in room 36, with wife Lexis([phone]) at at bedside. Pt is alert x2. Pt reports with the assistance of his wife, reports no prior DME, they live in the same home, with expectations for this pt to return when he has had a short rehab period. FOC provided, facility chosen [facility] which was placed in the e-discharge for acceptance.

## 2017-08-13 NOTE — PROGRESS NOTES
Problem: Falls - Risk of  Goal: *Absence of Falls  Document Jonna Fall Risk and appropriate interventions in the flowsheet.    Fall Risk Interventions:  Mobility Interventions: Bed/chair exit alarm     Mentation Interventions: Bed/chair exit alarm, Door open when patient unattended, Reorient patient     Medication Interventions: Bed/chair exit alarm           History of Falls Interventions: Bed/chair exit alarm, Door open when patient unattended

## 2017-08-14 NOTE — ROUTINE PROCESS
Bedside and Verbal shift change report given to RN Jose Keenan (oncoming nurse) by Fer Ding RN (offgoing nurse). Report included the following information SBAR, Kardex, MAR and Recent Results. SITUATION:  Code Status: Full Code  Reason for Admission: Sepsis ([facility] [address]  CAP (community acquired pneumonia)  Lung nodule  Hypokalemia  Acute encephalopathy  Sepsis [facility])  Hospital day: 2  Problem List:       Hospital Problems  Date Reviewed: 7/28/2017          Codes Class Noted POA    Hypokalemia ICD-10-CM: E87.6  ICD-9-CM: 276.8  8/11/2017 Unknown        Lung nodule ICD-10-CM: R91.1  ICD-9-CM: 793.11  8/11/2017 Unknown        * (Principal)CAP (community acquired pneumonia) ICD-10-CM: J18.9  ICD-9-CM: 825  8/11/2017 Unknown        Severe sepsis ([facility] [address] ICD-10-CM: A41.9, R65.20  ICD-9-CM: 038.9, 995.92  8/11/2017 Unknown        Acute encephalopathy ICD-10-CM: G93.40  ICD-9-CM: 348.30  8/11/2017 Unknown        Liver nodule ICD-10-CM: K76.89  ICD-9-CM: 573.8  8/11/2017 Unknown        Prostate enlargement ICD-10-CM: N40.0  ICD-9-CM: 600.00  8/11/2017 Unknown        Calculus of gallbladder without cholecystitis ICD-10-CM: K80.20  ICD-9-CM: 574.20  8/11/2017 Unknown              BACKGROUND:   Past Medical History:   Past Medical History:   Diagnosis Date    3-vessel coronary artery disease 02/13/2004    Tech limited. Low normal to mildly depressed LV systolic function. LVH. DDfx. LAE. PASP 25-30. 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 mLAD 100%. CX diffuse nonobstructive. OM1 patent stent. pRCA 100%. Dbl seq SVG to D1, OM1 100%. SVG to pRPDA 99% (3.5 x 23-mm Xience stent). Patent mid segment stent. LIMA to LAD ok.  Cardiac echocardiogram 11/16/2014    [facility]:  EF 55-60%. DDfx. Normal RVSP. Mild TR.   Neg bubble study for atrial shunt. No significant valvular pathology.  Cardiac nuclear imaging test, high risk 06/15/2012    Lg area of mod mid to distal anterior ischemia. Lg area of inferior ischemia. EF 57%. Mild mid-distal anterior, inferior hypk. High risk pharm stress test.    Cervical spine pain     Deviated septum     s/p corrective surgery 7/1/2010    Disc disease, degenerative, cervical     joint pain, back pain    Dysphagia, unspecified     Essential hypertension, benign     Gout with other specified manifestations     Head injury     frequent headaches    Hypercholesterolemia     Insomnia, unspecified     Lumbar canal stenosis     Meniere's disease     Myocardial infarction, anterior wall (HCC)     DIEGO (obstructive sleep apnea)     on CPAP    S/P diskectomy 7/5/07    cervical    Spinal stenosis, lumbar region, without neurogenic claudication     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     Unspecified sinusitis (chronic)     Ventral hernia, unspecified, without mention of obstruction or gangrene     S/p repair 6/2003      Patient taking anticoagulants yes    Patient has a defibrillator: no    History of shots YES for example, flu, pneumonia, tetanus   Isolation History NO for example, MRSA, CDiff    ASSESSMENT:  Changes in Assessment Throughout Shift: Pain issues throughout shift, continued periods of confusion/disorientation,    Significant Changes in 24 hours (for example, RR/code, fall)  Patient has Central Line: no Reasons if yes:   Patient has Michael Cath: no Reasons if yes:     Mobility Issues  PT  IV Patency  OR Checklist  Pending Tests    Last Vitals:  Vitals w/ MEWS Score (last day)     Date/Time MEWS Score Pulse Resp Temp BP Level of Consciousness SpO2    08/13/17 2000 -- 61 18 98.3 °F (36.8 °C) -- Alert 98 %    08/13/17 1200 1 (!)  57 16 97.6 °F (36.4 °C) 139/65 Alert 98 %    08/13/17 0800 1 62 15 97.8 °F (36.6 °C) 139/62 Alert 96 %    08/13/17 0423 1 64 16 97.7 °F (36.5 °C) 149/72 Alert 96 %    08/12/17 2000 1 73 18 98.3 °F (36.8 °C) 139/66 Alert 100 %    08/12/17 1156 1 64 19 97.6 °F (36.4 °C) 112/59 Alert 96 %    08/12/17 0521 1 74 20 97.9 °F (36.6 °C) 130/60 Alert 100 %    08/12/17 0110 -- (!)  58 18 99 °F (37.2 °C) 90/45 -- --            PAIN    Pain Assessment    Pain Intensity 1: 0 (08/13/17 2055)    Pain Location 1: Back    Pain Intervention(s) 1: Medication (see MAR)    Patient Stated Pain Goal: 0  Intervention effective: no  Time of last intervention: 1742 Reassessment Completed: yes   Other actions taken for pain: distraction    Last 3 Weights:  Last 3 Recorded Weights in this Encounter    08/11/17 0830 08/12/17 0843   Weight: 86.2 kg (190 lb) 93.6 kg (206 lb 4.8 oz)   Weight change: 7.394 kg (16 lb 4.8 oz)    INTAKE/OUPUT    Current Shift:      Last three shifts:      RECOMMENDATIONS AND DISCHARGE PLANNING  Patient needs and requests: pain management and AMS/LOC monitoring     Pending tests/procedures: am labs     Discharge plan for patient: home    Discharge planning Needs or Barriers: TBD    Estimated Discharge Date: 8/14/2017  Posted on Whiteboard in [facility]: no       \"HEALS\" SAFETY CHECK  A safety check occurred in the patient's room between off going nurse and oncoming nurse listed above. The safety check included the below items:    H  High Alert Medications Verify all high alert medication drips (heparin, PCA, etc.)  E  Equipment Suction is set up for ALL patients (with yanker)  Red plugs utilized for all equipment (IV pumps, etc.)  WOWs wiped down at end of shift.   Room stocked with oxygen, suction, and other unit-specific supplies  A  Alarms Bed alarm is set for fall risk patients  Ensure chair alarm is in place and activated if patient is up in a chair  L  Lines Check IV for any infiltration  Michael bag is empty if patient has a Michael   Tubing and IV bags are labeled  S  Safety  Room is clean, patient is clean, and equipment is clean.  Hallways are clear from equipment besides carts. Fall bracelet on for fall risk patients  Ensure room is clear and free of clutter  Suction is set up for ALL patients (with claire)  Hallways are clear from equipment besides carts.    Isolation precautions followed, supplies available outside room, sign posted    Constance Love RN

## 2017-08-14 NOTE — PROGRESS NOTES
[address] Joel was paged, Dr. Lucy Lange called back. He was informed of patient's c/o difficulty falling asleep. Order was received, and carried out.

## 2017-08-14 NOTE — PROGRESS NOTES
Had spoken to Dr. Albert Zheng regarding patient's BP readings while he is in pain. Dr. Albert Zheng said it is nothing to be worried about.

## 2017-08-14 NOTE — PROGRESS NOTES
Problem: Mobility Impaired (Adult and Pediatric)  Goal: *Acute Goals and Plan of Care (Insert Text)  Physical Therapy Goals  Initiated 8/13/2017 and to be accomplished within 3 day(s)  1. Patient will move from supine to sit and sit to supine , scoot up and down and roll side to side in bed with supervision/set-up. 2. Patient will transfer from bed to chair and chair to bed with supervision/set-up using the least restrictive device. 3. Patient will perform sit to stand with supervision/set-up. 4. Patient will ambulate with supervision/set-up for >100 feet with the least restrictive device. 5. Patient will ascend/descend 3 stairs with handrail(s) with supervision/set-up. Outcome: Progressing Towards Goal  PHYSICAL THERAPY TREATMENT     Patient: Suze Goodman. (77 y.o. male)  Date: 8/14/2017  Diagnosis: Sepsis ([facility] [address]  CAP (community acquired pneumonia)  Lung nodule  Hypokalemia  Acute encephalopathy  Sepsis ([facility] [address] CAP (community acquired pneumonia)  Precautions: Fall   Chart, physical therapy assessment, plan of care and goals were reviewed. ASSESSMENT:  Patient continues to progress with functional mobility. Patient received sitting in chair at bedside, reports to PT that the bed is uncomfortable to sleep, prefers recliner chair. Patient transitioned into standing independent, able to ambulate ~ 150 ft supervision with use of IV pole for assistance, vamshi accelerated at time, vc's provided for safety. Patient demonstrates mild impulsivity with movements, will continue to progress patients as tolerated. Progression toward goals:  [ ]      Improving appropriately and progressing toward goals  [X]      Improving slowly and progressing toward goals  [ ]      Not making progress toward goals and plan of care will be adjusted       PLAN:   Patient continues to benefit from skilled intervention to address the above impairments. Continue treatment per established plan of care.   Discharge Recommendations:  Home Health  Further Equipment Recommendations for Discharge:  N/A       SUBJECTIVE:   Patient stated I am doing pretty well.       OBJECTIVE DATA SUMMARY:   Critical Behavior:  Neurologic State: Alert, Appropriate for age  Orientation Level: Oriented to person, Oriented to place, Oriented to situation  Cognition: Follows commands, Poor safety awareness, Impulsive  Safety/Judgement: Fall prevention  Functional Mobility Training:  Bed Mobility:  Rolling:  (N/A-pt up in chair upon arrival )  Transfers:  Sit to Stand: Independent  Stand to Sit: Independent  Balance:  Sitting: Intact  Standing: Impaired; With support  Standing - Static: Good  Standing - Dynamic : Fair  Ambulation/Gait Training:  Distance (ft): 150 Feet (ft)  Assistive Device: Other (comment) (IV pole)  Ambulation - Level of Assistance: Supervision  Gait Abnormalities: Path deviations  Base of Support: Center of gravity altered  Speed/Sayra: Accelerated  Step Length: Right shortened;Left shortened  Pain:  Pain Scale 1: Numeric (0 - 10)  Pain Intensity 1: 0  Activity Tolerance:   Good  Please refer to the flowsheet for vital signs taken during this treatment.   After treatment:   [X] Patient left in no apparent distress sitting up in chair  [ ] Patient left in no apparent distress in bed  [X] Call bell left within reach  [ ] Nursing notified  [ ] Caregiver present  [ ] Bed alarm activated      Melba Galaviz, PT   Time Calculation: 9 mins

## 2017-08-14 NOTE — INTERDISCIPLINARY ROUNDS
IDR/SLIDR Summary          Patient: Jeanette Mane MRN: [id number]    Age: 80 y.o. YOB: 1935 Room/Bed: [facility]   Admit Diagnosis: Sepsis ([facility] [address]  CAP (community acquired pneumonia)  Lung nodule  Hypokalemia  Acute encephalopathy  Sepsis ([facility] [address]  Principal Diagnosis: CAP (community acquired pneumonia)   Goals: resolve CAP  Readmission: NO  Quality Measure: PNA  VTE Prophylaxis: Mechanical  Influenza Vaccine screening completed? YES  Pneumococcal Vaccine screening completed? YES  Mobility needs: No   Nutrition plan:No  Consults: P. T and O.T. Financial concerns:No  Escalated to CM? NO  RRAT Score: 24   Interventions:  Testing due for pt today? NO  LOS: 3 days Expected length of stay ?  days  Discharge plan: SNF   PCP: Lizbet Stephenson MD  Transportation needs: No    Days before discharge:two or more days before discharge   Discharge disposition: SNF    Signed:     Jose Diana RN  8/14/2017  8:44 AM

## 2017-08-14 NOTE — PROGRESS NOTES
*ATTENTION:  This note has been created by a medical student for educational purposes only. Please do not refer to the content of this note for clinical decision-making, billing, or other purposes. Please see attending physicians note to obtain clinical information on this patient. *       Progress Note  [facility]    MEDICAL STUDENT NOTE       Patient: Sandra Gannon. MRN: [id number]  CSN: [id number]    YOB: 1935  Age: 80 y.o. Sex: male    DOA: 8/11/2017 LOS:  LOS: 3 days                    Subjective:     Acute events: Patient complains of RUQ abdominal pain and back pain. He endorses improvement of cough and denies difficulty with BM and voiding. Patient has poor appetite, but that is a chronic condition. ROS    Objective:      Patient Vitals for the past 24 hrs:   Temp Pulse Resp BP SpO2   08/14/17 0346 98.2 °F (36.8 °C) 67 18 155/69 96 %   08/14/17 0034 - - - 161/69 -   08/13/17 2350 97.9 °F (36.6 °C) 63 18 169/73 99 %   08/13/17 2000 98.3 °F (36.8 °C) 61 18 156/62 98 %   08/13/17 1200 97.6 °F (36.4 °C) (!) 57 16 139/65 98 %         Intake/Output Summary (Last 24 hours) at 08/14/17 0902  Last data filed at 08/14/17 0509   Gross per 24 hour   Intake             1330 ml   Output              350 ml   Net              980 ml       Physical Exam    Lab/Data Reviewed:  Recent Results (from the past 24 hour(s))   CBC WITH AUTOMATED DIFF    Collection Time: 08/14/17  3:57 AM   Result Value Ref Range    WBC 9.1 4.6 - 13.2 K/uL    RBC 3.56 (L) 4.70 - 5.50 M/uL    HGB 10.8 (L) 13.0 - 16.0 g/dL    HCT 31.8 (L) 36.0 - 48.0 %    MCV 89.3 74.0 - 97.0 FL    MCH 30.3 24.0 - 34.0 PG    MCHC 34.0 31.0 - 37.0 g/dL    RDW 12.9 11.6 - 14.5 %    PLATELET 255 136 - 336 K/uL    MPV 11.2 9.2 - 11.8 FL    NEUTROPHILS 76 (H) 40 - 73 %    LYMPHOCYTES 16 (L) 21 - 52 %    MONOCYTES 7 3 - 10 %    EOSINOPHILS 1 0 - 5 %    BASOPHILS 0 0 - 2 %    ABS. NEUTROPHILS 6.9 1.8 - 8.0 K/UL    ABS. LYMPHOCYTES 1.5 0.9 - 3.6 K/UL    ABS. MONOCYTES 0.7 0.05 - 1.2 K/UL    ABS. EOSINOPHILS 0.1 0.0 - 0.4 K/UL    ABS. BASOPHILS 0.0 0.0 - 0.1 K/UL    DF AUTOMATED     METABOLIC PANEL, BASIC    Collection Time: 08/14/17  3:57 AM   Result Value Ref Range    Sodium 143 136 - 145 mmol/L    Potassium 3.3 (L) 3.5 - 5.5 mmol/L    Chloride 109 (H) 100 - 108 mmol/L    CO2 25 21 - 32 mmol/L    Anion gap 9 3.0 - 18 mmol/L    Glucose 102 (H) 74 - 99 mg/dL    BUN 12 7.0 - 18 MG/DL    Creatinine 0.56 (L) 0.6 - 1.3 MG/DL    BUN/Creatinine ratio 21 (H) 12 - 20      GFR est AA >60 >60 ml/min/1.73m2    GFR est non-AA >60 >60 ml/min/1.73m2    Calcium 7.9 (L) 8.5 - 10.1 MG/DL         Scheduled Medications Reviewed:  Current Facility-Administered Medications   Medication Dose Route Frequency    oxyCODONE ER (OxyCONTIN) tablet 10 mg  10 mg Oral Q12H    levoFLOXacin (LEVAQUIN) 750 mg in D5W IVPB  750 mg IntraVENous Q24H    allopurinol (ZYLOPRIM) tablet 300 mg  300 mg Oral DAILY    aspirin delayed-release tablet 81 mg  81 mg Oral BID    potassium chloride (KLOR-CON) tablet 10 mEq  10 mEq Oral DAILY    simvastatin (ZOCOR) tablet 40 mg  40 mg Oral QHS    0.9% sodium chloride with KCl 20 mEq/L infusion   IntraVENous CONTINUOUS         Imaging, microbiology, and EKG/Telemetry:  8/12 CT Chest: CT chest showed Patchy infiltrate at the lingula and posterior lung bases. Trace bilateral pleural effusions. Subcarinal adenopathy, measures 2.8 x 1.5 cm, possibly  Reactive. Nodular opacities ranging obstruction at the right middle lobe, and bilateral  lower lobes, ranging from 5 to 10 mm. Hypodense lesion in the right hepatic lobe is less conspicuous on unenhanced exam.    Assessment/Plan   Suze Pandey is a 80year old male with a history of MI s/p CABG x4 (1994), CVA with residual dysarthria (2012), DIEGO, CAD, DM2, HTN and chronic back pain with spinal stenosis who was admitted for sepsis, hypokalemia, CAP, liver and lung nodule. Acute encephalopathy/Sepsis of unknown source: Most likely 2/2 to CAP. Blood cx no growth 3 days. Lactic acid 2.6>1.8. Pro-BNP 1227. CT chows evidence of infiltrates. -pulm consult  -Levaquin 750 mg every day IV  -Melatonin 10mg PRN po for insomnia  -Switch to PO meds and transfer to SNF    Lung nodules: On CT, 6 mm nodule at the right middle lobe, 10 x 5 mm nodule at the superior segment right lower lobe, 7 mm subpleural nodule at the  right posterior costophrenic sulcus and 5 mm pulmonary subpleural nodule at the anterolateral left lung base. Patient has lost 15 lbs since 3/2017. Possible infectious process, masses or cysts. -FU pulm outpt     Liver nodule: Possible cyst or mass, further imaging indicated.  -Abd U/S today      Hypokalemia: On admission K 3.2 and patient has chronic history of hypokalemia. He takes KCl 10 mEq. EKG showed NSR and Right BBB, which is chronic and similar to 2/2017 EKG. -Continue to monitor  -daily BMP    CAD,HTN,HLD  -ASA 81 mg po QD, Simvastatin 40 mg PO qhs, Nitrostat PRN    Prostate enlargement  -continue to monitor    DM 2: Hgb A1c 6.7  -Accuchecks    Cholelithiasis with distended gallbladder: Confirmed on CT Chest but no evidence of inflammation and pt is hemodynamically stable. -Surgery consult: no surgery recommended  -FU outpatient    AMS: At baseline, per wife. CT head showed increase in size of ventricles and chronic small  Vessel ischemic changes. Possibly due to medications or cognitive disorder.   -FU outpatient    Hx of gout  -allopurinol 300 mg every day po    Hx of chronic back pain, spinal stenosis  -torodol 15 mg q6hs IV, Oxycontin 10 mg BID PO, Percocet 5-325 mg q4hs po    Diet: cardaic  DVT Prophylaxis: SCD  Code Status: full      [employer], M4

## 2017-08-14 NOTE — PROGRESS NOTES
Intern Progress Note  [facility]       Patient: Shanti Cohen. MRN: [id number]  CSN: [id number]    YOB: 1935  Age: 80 y.o. Sex: male    DOA: 8/11/2017 LOS:  LOS: 3 days                    Subjective:     Acute events: Pt complaining of chronic back pain, not in acute distress. Headache resolved, breathing improved. Pt still has non-productive cough. WBC improved- now 9.1    Review of Systems   Eyes: Negative for blurred vision and double vision. Respiratory: Positive for cough. Negative for hemoptysis, sputum production and shortness of breath. Cardiovascular: Positive for leg swelling. Negative for chest pain. Gastrointestinal: Negative for blood in stool, constipation, diarrhea and nausea. Genitourinary: Negative for dysuria, frequency, hematuria and urgency. Musculoskeletal: Positive for back pain. Neurological: Negative for dizziness and headaches. Objective:      Patient Vitals for the past 24 hrs:   Temp Pulse Resp BP SpO2   08/14/17 0346 98.2 °F (36.8 °C) 67 18 155/69 96 %   08/14/17 0034 - - - 161/69 -   08/13/17 2350 97.9 °F (36.6 °C) 63 18 169/73 99 %   08/13/17 2000 98.3 °F (36.8 °C) 61 18 156/62 98 %   08/13/17 1200 97.6 °F (36.4 °C) (!) 57 16 139/65 98 %         Intake/Output Summary (Last 24 hours) at 08/14/17 0830  Last data filed at 08/14/17 0509   Gross per 24 hour   Intake             1330 ml   Output              350 ml   Net              980 ml       Physical Exam   Constitutional: He appears well-developed and well-nourished. HENT:   Head: Normocephalic and atraumatic. Eyes: EOM are normal.   Neck: Normal range of motion. Cardiovascular: Normal rate, regular rhythm and normal heart sounds. Pulmonary/Chest: Effort normal. He has rhonchi in the left upper field and the left lower field. Abdominal: Soft. Bowel sounds are normal. He exhibits no distension.  There is tenderness in the right upper quadrant, right lower quadrant and left upper quadrant. Skin: Skin is warm and dry. Psychiatric: He has a normal mood and affect. His behavior is normal.   Neuro: Alert, responsive, not lethargic, answers questions/follows commands easily    Lab/Data Reviewed:  Recent Results (from the past 24 hour(s))   CBC WITH AUTOMATED DIFF    Collection Time: 08/14/17  3:57 AM   Result Value Ref Range    WBC 9.1 4.6 - 13.2 K/uL    RBC 3.56 (L) 4.70 - 5.50 M/uL    HGB 10.8 (L) 13.0 - 16.0 g/dL    HCT 31.8 (L) 36.0 - 48.0 %    MCV 89.3 74.0 - 97.0 FL    MCH 30.3 24.0 - 34.0 PG    MCHC 34.0 31.0 - 37.0 g/dL    RDW 12.9 11.6 - 14.5 %    PLATELET 234 081 - 456 K/uL    MPV 11.2 9.2 - 11.8 FL    NEUTROPHILS 76 (H) 40 - 73 %    LYMPHOCYTES 16 (L) 21 - 52 %    MONOCYTES 7 3 - 10 %    EOSINOPHILS 1 0 - 5 %    BASOPHILS 0 0 - 2 %    ABS. NEUTROPHILS 6.9 1.8 - 8.0 K/UL    ABS. LYMPHOCYTES 1.5 0.9 - 3.6 K/UL    ABS. MONOCYTES 0.7 0.05 - 1.2 K/UL    ABS. EOSINOPHILS 0.1 0.0 - 0.4 K/UL    ABS.  BASOPHILS 0.0 0.0 - 0.1 K/UL    DF AUTOMATED     METABOLIC PANEL, BASIC    Collection Time: 08/14/17  3:57 AM   Result Value Ref Range    Sodium 143 136 - 145 mmol/L    Potassium 3.3 (L) 3.5 - 5.5 mmol/L    Chloride 109 (H) 100 - 108 mmol/L    CO2 25 21 - 32 mmol/L    Anion gap 9 3.0 - 18 mmol/L    Glucose 102 (H) 74 - 99 mg/dL    BUN 12 7.0 - 18 MG/DL    Creatinine 0.56 (L) 0.6 - 1.3 MG/DL    BUN/Creatinine ratio 21 (H) 12 - 20      GFR est AA >60 >60 ml/min/1.73m2    GFR est non-AA >60 >60 ml/min/1.73m2    Calcium 7.9 (L) 8.5 - 10.1 MG/DL       Scheduled Medications Reviewed:  Current Facility-Administered Medications   Medication Dose Route Frequency    oxyCODONE ER (OxyCONTIN) tablet 10 mg  10 mg Oral Q12H    levoFLOXacin (LEVAQUIN) 750 mg in D5W IVPB  750 mg IntraVENous Q24H    allopurinol (ZYLOPRIM) tablet 300 mg  300 mg Oral DAILY    aspirin delayed-release tablet 81 mg  81 mg Oral BID    potassium chloride (KLOR-CON) tablet 10 mEq  10 mEq Oral DAILY    simvastatin (ZOCOR) tablet 40 mg  40 mg Oral QHS    0.9% sodium chloride with KCl 20 mEq/L infusion   IntraVENous CONTINUOUS         Imaging, microbiology, and EKG/Telemetry:  CT Chest/abd: 1. Patchy infiltrate at the lingula and posterior lung bases, concerning for pneumonia. Follow-up imaging recommended to document resolution.  -Trace bilateral pleural effusions.  -Additional findings: Subcarinal adenopathy, measures 2.8 x 1.5 cm, possibly  reactive but no additional adenopathy identified. Recommend attention on  follow-up exam.   2. Nodular opacities ranging obstruction at the right middle lobe, and bilateral  lower lobes, ranging from 5 to 10 mm. Uncertain whether these are part of the suspected infectious process as above. Recommend attention on follow-up exam.   3. Cholelithiasis.   4. Fluid levels in the colon could represent diarrhea. 5. Hypodense lesion in the right hepatic lobe is less conspicuous on unenhanced exam. As previously recommended, ultrasound versus multiphase CT or MRI may be helpful for further characterization. 6. Atherosclerosis.     Assessment/Plan     80 y.o. male with a PMHx of CAD, CABG x4, diverticulitis, spinal stenosis, periumbilical hernia s/p repair w/ mesh, gout, CVA in 2014, Type II DM2, and HTN, now admitted with sepsis of unknown etiology.      Acute encephalopathy/Sepsis of unknown etiology- Likely 2/2 CAP, bilateral infiltrates seen in lung bases on CT Abd Pelvis; 2/4 SIRS criteria on admission: WBC: 14.5>15.1>10.4>8.5>9.1, afebrile since admission, Head CT negative, UA negative, blood cx negative   -- Levaquin 750 IV daily   --Neuro checks q4h  -- Hold home mirtazapine  --PRN percoset/oxycontin for back pain  - pro-BNP 1227     Lactic Acidosis (resolved): 2.6>1.8, transient, resolved in same day    Lung nodule: 0.6cm nodule in R mid lobe, 1.0cm nodule in R lower lobe, 3.1 x 1.3 cm node in subcarinal region  -- FU with pulm outpatient  - CT chest w/o contrast pending     Cholelithiasis with gallbladder distension   - Surgery consulted- no surgery planned   - RUQ US results pending      Liver nodule- 1.2 X 1.0 cm in R lobe of liver seen on CT Abd pelv  - RUQ US pending       Prostate enlargement- Seen on CT Abd Pelv   - PSA 2.2      Hypokalemia: K: 3.2 on admission, normal sinus rhythm on EKG  --Continue home med: Klor-con 10mEq daily      CAD, HTN, HLD   - followed by Dr. Beverly Redmond [place] [id number]   - last heart cath 3/15/2017 showed moderate diffuse disease without critical stenosis, continued medical therapy rec'd  - Continue home meds- Metoprolol 50 mg BID, Simvastatin 40 mg qhs, ASA 81 mg daily  - NTG 0.4 mg sublingual PRN         Type II DM/HTN/HLD: Per wife, patient was diagnosed after CVA, lost weight, and has resolved.   --HbA1c 6.7  --accuchecks AC & HS      Hx of Gout   - Continue home Allopurinol 300 mg daily       Hx of Spinal stenosis, Chronic pain   - Oxycontin ER 10 mg BID, percoset 5/325 q4h PRN   - Lidoderm patch daily   - Toradol 15 mg IV q 6hrs PRN           Diet: Diabetic  DVT Prophylaxis: SCDs  Code Status: Full    Georges Hummel MD, PGY-1  [facility]  08/14/17 8:30 AM

## 2017-08-14 NOTE — CDMP QUERY
Please clarify if this patient is being treated/managed for:    =>Lactic Acidosis - (Lactic acid 2.6 in ER)    =>Other Explanation of clinical findings  =>Unable to Determine (no explanation of clinical findings)    The medical record reflects the following clinical findings, treatment, and risk factors:    --  79 yo with Dx severe sepsis  with pneumonia   --lactic acid in ER 2.6 and repeated per sepsis order bundle  --repeat levels 1.8  --IVF and IV abx given

## 2017-08-14 NOTE — ROUTINE PROCESS
Bedside and Verbal shift change report given to Catarina Silverman (oncoming nurse) by Krystal Tabor RN (offgoing nurse). Report included the following information SBAR, Kardex, MAR and Recent Results. SITUATION:    Code Status: Full Code  Reason for Admission: Sepsis ([address]  CAP (community acquired pneumonia)  Lung nodule  Hypokalemia  Acute encephalopathy   Sepsis [facility])    [facility] day: 3   Problem List:       Hospital Problems  Date Reviewed: 7/28/2017          Codes Class Noted POA    Hypokalemia ICD-10-CM: E87.6  ICD-9-CM: 276.8  8/11/2017 Unknown        Lung nodule ICD-10-CM: R91.1  ICD-9-CM: 793.11  8/11/2017 Unknown        * (Principal)CAP (community acquired pneumonia) ICD-10-CM: J18.9  ICD-9-CM: 232  8/11/2017 Unknown        Severe sepsis ([address] ICD-10-CM: A41.9, R65.20  ICD-9-CM: 038.9, 995.92  8/11/2017 Unknown        Acute encephalopathy ICD-10-CM: G93.40  ICD-9-CM: 348.30  8/11/2017 Unknown        Liver nodule ICD-10-CM: K76.89  ICD-9-CM: 573.8  8/11/2017 Unknown        Prostate enlargement ICD-10-CM: N40.0  ICD-9-CM: 600.00  8/11/2017 Unknown        Calculus of gallbladder without cholecystitis ICD-10-CM: K80.20  ICD-9-CM: 574.20  8/11/2017 Unknown              BACKGROUND:    Past Medical History:   Past Medical History:   Diagnosis Date    3-vessel coronary artery disease 02/13/2004    Tech limited. Low normal to mildly depressed LV systolic function. LVH. DDfx. LAE. PASP 25-30. 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 mLAD 100%. CX diffuse nonobstructive. OM1 patent stent. pRCA 100%. Dbl seq SVG to D1, OM1 100%. SVG to pRPDA 99% (3.5 x 23-mm Xience stent). Patent mid segment stent. LIMA to LAD ok.  Cardiac echocardiogram 11/16/2014    [facility]:  EF 55-60%. DDfx. Normal RVSP. Mild TR.   Neg bubble study for atrial shunt. No significant valvular pathology.  Cardiac nuclear imaging test, high risk 06/15/2012    Lg area of mod mid to distal anterior ischemia. Lg area of inferior ischemia. EF 57%. Mild mid-distal anterior, inferior hypk. High risk pharm stress test.    Cervical spine pain     Deviated septum     s/p corrective surgery 7/1/2010    Disc disease, degenerative, cervical     joint pain, back pain    Dysphagia, unspecified     Essential hypertension, benign     Gout with other specified manifestations     Head injury     frequent headaches    Hypercholesterolemia     Insomnia, unspecified     Lumbar canal stenosis     Meniere's disease     Myocardial infarction, anterior wall (HCC)     DIEGO (obstructive sleep apnea)     on CPAP    S/P diskectomy 7/5/07    cervical    Spinal stenosis, lumbar region, without neurogenic claudication     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     Unspecified sinusitis (chronic)     Ventral hernia, unspecified, without mention of obstruction or gangrene     S/p repair 6/2003         Patient taking anticoagulants no     ASSESSMENT:    Changes in Assessment Throughout Shift: no     Patient has Central Line: no Reasons if yes:    Patient has Michael Cath: no Reasons if yes:       Last Vitals:     Vitals:    08/13/17 2350 08/14/17 0004 08/14/17 0034 08/14/17 0346   BP: 169/73  161/69 155/69   Pulse: 63   67   Resp: 18   18   Temp: 97.9 °F (36.6 °C)   98.2 °F (36.8 °C)   SpO2: 99%   96%   Weight:  92.7 kg (204 lb 4.8 oz)     Height:            IV and DRAINS (will only show if present)   Peripheral IV 08/11/17 Right; Lower Arm-Site Assessment: Clean, dry, & intact  Peripheral IV 08/11/17 Left; Lower Arm-Site Assessment: Clean, dry, & intact     WOUND (if present)   Wound Type:  none   Dressing present Dressing Present : No   Wound Concerns/Notes:  none     PAIN    Pain Assessment    Pain Intensity 1: 0 (08/14/17 0450)    Pain Location 1: Back    Pain Intervention(s) 1: Medication (see MAR)    Patient Stated Pain Goal: 0  o Interventions for Pain:  none  o Intervention effective: no  o Time of last intervention: 1900   o Reassessment Completed: yes      Last 3 Weights:  Last 3 Recorded Weights in this Encounter    08/11/17 0830 08/12/17 0843 08/14/17 0004   Weight: 86.2 kg (190 lb) 93.6 kg (206 lb 4.8 oz) 92.7 kg (204 lb 4.8 oz)     Weight change: -0.907 kg (-2 lb)     INTAKE/OUPUT    Current Shift:      Last three shifts: 08/12 1901 - 08/14 0700  In: 1330 [P.O.:330; I.V.:1000]  Out: 350 [Urine:350]     LAB RESULTS     Recent Labs      08/14/17   0357  08/13/17   0038  08/12/17   0346   WBC  9.1  8.5  10.4   HGB  10.8*  11.1*  11.9*   HCT  31.8*  32.8*  35.8*   PLT  163  136  147        Recent Labs      08/14/17   0357  08/13/17   0038  08/12/17   0346   08/11/17   0845   NA  143  145  146*   < >  139   K  3.3*  3.2*  3.5   < >  3.2*   GLU  102*  108*  117*   < >  193*   BUN  12  15  14   < >  15   CREA  0.56*  0.63  0.65   < >  0.93   CA  7.9*  8.0*  8.3*   < >  8.9   MG   --    --    --    --   1.8   INR   --    --    --    --   1.1    < > = values in this interval not displayed. RECOMMENDATIONS AND DISCHARGE PLANNING     1. Pending tests/procedures/ Plan of Care or Other Needs: none     2. Discharge plan for patient and Needs/Barriers: tbd    3. Estimated Discharge Date: 8.15 Posted on Whiteboard in Patients Room: no      4. The patient's care plan was reviewed with the oncoming nurse. \"HEALS\" SAFETY CHECK      Fall Risk    Total Score: 6    Safety Measures: Safety Measures: Bed/Chair alarm on, Bed/Chair-Wheels locked, Bed in low position, Call light within reach, Fall prevention (comment), Gripper socks, Side rails X 3    A safety check occurred in the patient's room between off going nurse and oncoming nurse listed above.     The safety check included the below items  Area Items   H  High Alert Medications - Verify all high alert medication drips (heparin, PCA, etc.)   E  Equipment - Suction is set up for ALL patients (with claire)  - Red plugs utilized for all equipment (IV pumps, etc.)  - WOWs wiped down at end of shift.  - Room stocked with oxygen, suction, and other unit-specific supplies   A  Alarms - Bed alarm is set for fall risk patients  - Ensure chair alarm is in place and activated if patient is up in a chair   L  Lines - Check IV for any infiltration  - Michael bag is empty if patient has a Michael   - Tubing and IV bags are labeled   S  Safety   - Room is clean, patient is clean, and equipment is clean. - Hallways are clear from equipment besides carts. - Fall bracelet on for fall risk patients  - Ensure room is clear and free of clutter  - Suction is set up for ALL patients (with claire)  - Hallways are clear from equipment besides carts.    - Isolation precautions followed, supplies available outside room, sign posted     Emmanuel Ureña RN

## 2017-08-14 NOTE — PROGRESS NOTES
*ATTENTION:  This note has been created by a medical student for educational purposes only. Please do not refer to the content of this note for clinical decision-making, billing, or other purposes. Please see attending physicians note to obtain clinical information on this patient. *         Progress Note  [facility]     MEDICAL STUDENT NOTE         Patient: Yoan Doss MRN: [id number]  CSN: [id number]    YOB: 1935  Age: 80 y.o. Sex: male    DOA: 8/11/2017 LOS:  LOS: 3 days           Subjective:      Acute events: Patient complains of RUQ abdominal pain and back pain. He endorses improvement of cough and denies difficulty with BM and voiding.  Patient has poor appetite, but that is a chronic condition.         ROS   General ROS: negative for  chills, fever, night sweats  Respiratory ROS: pos for cough, negative for hemoptysis, shortness of breath, orthopnea, paroxysmal dyspnea, or wheezing  Cardiovascular ROS: negative for  chest pain, dyspnea on exertion, edema, loss of consciousness, or palpitations   Gastrointestinal ROS: pos for RUQ pain, negative for blood in stools, change in stools, constipation, diarrhea, nausea/vomiting  Genito-Urinary ROS: negative for dysuria, hematuria or urinary frequency/urgency  Musculoskeletal ROS: pos fir back pain, negative for joint pain, joint swelling or muscle pain      Objective:       Patient Vitals for the past 24 hrs:    Temp Pulse Resp BP SpO2   08/14/17 0346 98.2 °F (36.8 °C) 67 18 155/69 96 %   08/14/17 0034 - - - 161/69 -   08/13/17 2350 97.9 °F (36.6 °C) 63 18 169/73 99 %   08/13/17 2000 98.3 °F (36.8 °C) 61 18 156/62 98 %   08/13/17 1200 97.6 °F (36.4 °C) (!) 57 16 139/65 98 %            Intake/Output Summary (Last 24 hours) at 08/14/17 0902  Last data filed at 08/14/17 0509    Gross per 24 hour   Intake             1330 ml   Output              350 ml   Net              980 ml         Physical Exam  General:  Alert and Responsive and in No acute distress. CV:  RRR. No murmurs, rubs, or gallops appreciated. No visible pulsations or thrills. RESP:  Rhonchi in left lung base  ABD:  Soft, nontender, nondistended. Normoactive bowel sounds. Ext:  No edema. 2+ radial and dp pulses bilaterally. Skin:  No rashes, lesions, or ulcers. Good turgor.     Lab/Data Reviewed:   Recent Results          Recent Results (from the past 24 hour(s))   CBC WITH AUTOMATED DIFF     Collection Time: 08/14/17  3:57 AM   Result Value Ref Range     WBC 9.1 4.6 - 13.2 K/uL     RBC 3.56 (L) 4.70 - 5.50 M/uL     HGB 10.8 (L) 13.0 - 16.0 g/dL     HCT 31.8 (L) 36.0 - 48.0 %     MCV 89.3 74.0 - 97.0 FL     MCH 30.3 24.0 - 34.0 PG     MCHC 34.0 31.0 - 37.0 g/dL     RDW 12.9 11.6 - 14.5 %     PLATELET 883 004 - 372 K/uL     MPV 11.2 9.2 - 11.8 FL     NEUTROPHILS 76 (H) 40 - 73 %     LYMPHOCYTES 16 (L) 21 - 52 %     MONOCYTES 7 3 - 10 %     EOSINOPHILS 1 0 - 5 %     BASOPHILS 0 0 - 2 %     ABS. NEUTROPHILS 6.9 1.8 - 8.0 K/UL     ABS. LYMPHOCYTES 1.5 0.9 - 3.6 K/UL     ABS. MONOCYTES 0.7 0.05 - 1.2 K/UL     ABS. EOSINOPHILS 0.1 0.0 - 0.4 K/UL     ABS.  BASOPHILS 0.0 0.0 - 0.1 K/UL     DF AUTOMATED      METABOLIC PANEL, BASIC     Collection Time: 08/14/17  3:57 AM   Result Value Ref Range     Sodium 143 136 - 145 mmol/L     Potassium 3.3 (L) 3.5 - 5.5 mmol/L     Chloride 109 (H) 100 - 108 mmol/L     CO2 25 21 - 32 mmol/L     Anion gap 9 3.0 - 18 mmol/L     Glucose 102 (H) 74 - 99 mg/dL     BUN 12 7.0 - 18 MG/DL     Creatinine 0.56 (L) 0.6 - 1.3 MG/DL     BUN/Creatinine ratio 21 (H) 12 - 20       GFR est AA >60 >60 ml/min/1.73m2     GFR est non-AA >60 >60 ml/min/1.73m2     Calcium 7.9 (L) 8.5 - 10.1 MG/DL               Scheduled Medications Reviewed:         Current Facility-Administered Medications   Medication Dose Route Frequency    oxyCODONE ER (OxyCONTIN) tablet 10 mg  10 mg Oral Q12H    levoFLOXacin (LEVAQUIN) 750 mg in D5W IVPB  750 mg IntraVENous Q24H    allopurinol (ZYLOPRIM) tablet 300 mg  300 mg Oral DAILY    aspirin delayed-release tablet 81 mg  81 mg Oral BID    potassium chloride (KLOR-CON) tablet 10 mEq  10 mEq Oral DAILY    simvastatin (ZOCOR) tablet 40 mg  40 mg Oral QHS    0.9% sodium chloride with KCl 20 mEq/L infusion    IntraVENous CONTINUOUS            Imaging, microbiology, and EKG/Telemetry:  8/12 CT Chest: CT chest showed Patchy infiltrate at the lingula and posterior lung bases. Trace bilateral pleural effusions. Subcarinal adenopathy, measures 2.8 x 1.5 cm, possibly  Reactive. Nodular opacities ranging obstruction at the right middle lobe, and bilateral  lower lobes, ranging from 5 to 10 mm. Hypodense lesion in the right hepatic lobe is less conspicuous on unenhanced exam.     Assessment/Plan   Suze Llamas is a 80year old male with a history of MI s/p CABG x4 (1994), CVA with residual dysarthria (2012), DIEGO, CAD, DM2, HTN and chronic back pain with spinal stenosis who was admitted for sepsis, hypokalemia, CAP, liver and lung nodule.      Acute encephalopathy/Sepsis of unknown source: Most likely 2/2 to CAP. Blood cx no growth 3 days. Lactic acid 2.6>1.8. Pro-BNP 1227. CT chows evidence of infiltrates. -pulm consult  -Levaquin 750 mg every day IV  -Melatonin 10mg PRN po for insomnia  -Switch to PO meds and transfer to SNF     Lung nodules: On CT, 6 mm nodule at the right middle lobe, 10 x 5 mm nodule at the superior segment right lower lobe, 7 mm subpleural nodule at the  right posterior costophrenic sulcus and 5 mm pulmonary subpleural nodule at the anterolateral left lung base. Patient has lost 15 lbs since 3/2017. Possible infectious process, masses or cysts. -FU pulm outpt      Liver nodule: Possible cyst or mass, further imaging indicated.  -Abd U/S today        Hypokalemia: On admission K 3.2 and patient has chronic history of hypokalemia. He takes KCl 10 mEq.  EKG showed NSR and Right BBB, which is chronic and similar to 2/2017 EKG. -Continue to monitor  -daily BMP     CAD,HTN,HLD  -ASA 81 mg po QD, Simvastatin 40 mg PO qhs, Nitrostat PRN     Prostate enlargement  -continue to monitor     DM 2: Hgb A1c 6.7  -Accuchecks     Cholelithiasis with distended gallbladder: Confirmed on CT Chest but no evidence of inflammation and pt is hemodynamically stable. -Surgery consult: no surgery recommended  -FU outpatient     AMS: At baseline, per wife. CT head showed increase in size of ventricles and chronic small  Vessel ischemic changes. Possibly due to medications or cognitive disorder.   -FU outpatient     Hx of gout  -allopurinol 300 mg every day po     Hx of chronic back pain, spinal stenosis  -torodol 15 mg q6hs IV, Oxycontin 10 mg BID PO, Percocet 5-325 mg q4hs po     Diet: cardaic  DVT Prophylaxis: SCD  Code Status: full        Toni Haro, M4

## 2017-08-14 NOTE — PROGRESS NOTES
Care Management Interventions  PCP Verified by CM: Yes (DR. Lynsey Waddell)  Palliative Care Consult (Criteria: CHF and RRAT>21): No  Reason for No Palliative Care Consult: Other (see comment) (NO ORDER)  Mode of Transport at Discharge: [address]  [address] Time of Discharge: 1900  Physical Therapy Consult: Yes  Occupational Therapy Consult: Yes  Current Support Network: Lives with Spouse, Own Home, Family Lives Nearby  Confirm Follow Up Transport: Family  Plan discussed with Pt/Family/Caregiver: Yes  Freedom of Choice Offered: Yes ([facility] for rehab)  Discharge Location  Discharge Placement: Skilled nursing facility     Pt has been accepted to [facility]. CM was informed that they can offer pt a semi private room. CM informed pt and his spouse and they wish to take the semi private room. Physician informed of acceptance and pt will be discharged. Transport  scheduled for 1900 with [facility]. Pt and his spouse, April (admission coordinator), nurse and physician are aware.

## 2017-08-15 NOTE — PROGRESS NOTES
Nurse Kiera Castorena from [facility] called and said that the prescription for Oxycontin and Percocet were not sent along with the patient upon discharge. Dr. Henry Silverman said that these medications were prior to admission medications so patient's PCP should be the ne to write the prescription.

## 2017-08-15 NOTE — PROGRESS NOTES
[address] to SNF:  Patient admitted to [facility] on 8/11/17 to 8/14/17 for sepsis/community acquired pneumonia. Course of hospitalization:  Admitted via ED with c/o AMS X 2-3 days. Was febrile at admission; 101.5. Labs indicated WBC's; 15.1 and lactic acid; 2.6. Imaging indicated lower lung infiltrates and nodules of right lower lung, middle lobe and subcarinal node enlargement and cholelithiasis with distended gallbladder. Was started on Levaquin and IVF. General surgeon consulted for cholelithiasis; no surgery planned. WBC's and lactic acid WNL at time of discharge to SNF for rehab. Inpatient Consults:  Dr. Le Prince; general surgery     Discharge diagnoses:   CAD  Diverticulitis  Spinal Stenosis  DM2  HTN  Acute encephalopathy 2/2 sepsis  Cholelithiasis w/ GB distension  Lung nodule  Liver nodule  HLD  Gout  Chronic pain    Contacted [address] to verify admission, review discharge instructions and reconcile medications. Spoke to [employer], LPN. Introduced self and reason for call. Reconciled transfer medications. Awaiting Narcan spray to be delivered. Opportunity to ask questions was provided. Chart forwarded to Luis Espinosa RN Nurse Navigator for future monitoring and continuum of care.

## 2017-08-16 PROBLEM — R10.9 INTRACTABLE ABDOMINAL PAIN: Status: ACTIVE | Noted: 2017-01-01

## 2017-08-16 PROBLEM — K80.20 GALLSTONES: Status: ACTIVE | Noted: 2017-01-01

## 2017-08-16 PROBLEM — R11.10 VOMITING: Status: ACTIVE | Noted: 2017-01-01

## 2017-08-18 PROBLEM — R10.9 ABDOMINAL PAIN: Status: ACTIVE | Noted: 2017-01-01

## 2017-08-22 NOTE — PROGRESS NOTES
Transitions of Care Coordination    Miriam Hernandez was admitted to [facility] 8/11-8/14/17 for  sepsis/community acquired pneumonia and transferred to [address] [place] [address]. The patient was readmitted to [address] [place] [address] 8/16-8/21/17 for acute cholecystitis and underwent a cholecystostomy. The patient was transferred back to [address] [place] [address]. RRAT =29    Portions of the Discharge Summary Written by Dr. Sharlene Neal on 8/21/17 are below in italics. Follow-up recommendations:                        Low fat diet. Cholecystostomy care.     Discharge diagnosis/Hospital course:       RUQ  tenderness with leukocytosis, Acute cholecystitis with s/p cholecystostomy   -Patient presented with intractable right upper quadrant abdominal pain, his initial imaging showed acute cholecystitis. He was initially seen by a surgeon and the he was not a candidate for surgical intervention. He was continued on medical management with antibiotics. His abdominal pain got worsened. He underwent cholecystostomy tube placement on 8/18/2017 at interventional radiology. The plan to leave the drain to gravity drainage. leukocytosis improved, switched to Augmentin PO upon discharge, patient was seen and evaluated by infectious disease and agree with the plan.      -Cholecystostomy tube in place by IR on 8/18, cultures from the tube are negative. Bilirubin has improved. LFT normal.   -Poor surgical candidate, per Gen.  Surgery, signed off.  -Tolerating diet  -RUQ US 08/16/2017 showed cholelithiasis and no gallbladder wall thickening or pericholecystic fluid, nonobstructive right renal stone vs. Vascular calcification, and small right pleural effusion  -Pain is control with Morphine, PRN Tylenol and home pain medication (Oxycodone 20 mg) for preexisting chronic pain  , Miralax  to prevent constipation       Constipation/ileus  -resolved  likely due to pain medicine side effect   -improved and having bowel movements, continue Miralax.      Dysuria symptoms  -UA 8/16/2017 negative for UTI       Hypokalemia  replenished.      Recent diagnosis of Pneumonia, likely in conjunction with bronchitis   -08/18/2017 Legionella and Strep Pneumo Ag are negative   -Sputum culture 08/16/2017 showed Mucus Present 0 - 25 WBC's/lpf <10 Epithelial cells/lpf  Rare Gram Positive Cocci In Clusters, continue Zosyn while in the hospital   -CT on 08/12/2017 showed Patchy infiltrate at the lingula and posterior lung bases, concerning for pneumonia. Follow-up imaging, recommended to document resolution 4 weeks. -Trace bilateral pleural effusions.  -Additional findings: Subcarinal adenopathy, measures 2.8 x 1.5 cm, possibly reactive but no additional adenopathy identified. Recommend attention on follow-up exam.  -CXR 08/16/2017 Bibasilar atelectasis/consolidation. Trace left pleural effusion.  Pt is using spirometry multiple times a day        Hx of ventral hernia repairs without mention of obstruction or gangrene  -RUQ pain due to SBO is least likely      Hx of MI  -No symptoms of MI this admission.  -On ASA      Hx of stroke  -Expressive aphasia  -No new weakness this admission  -Continue ASA      HTN  -Well controlled on Lopressor 25 mg   -Will monitor closely       Gout  -On Allopurinol 300 mg to prevent a frair       Hypercholesteremia  -LFT are normal, will hold home med Niacin and Zocor for now      Spinal stenosis, lumbar region, w/o neurogenic claudication, chronic pain disorder    -Pain control, home meds      Distant history of GABG      New status: DNR     Follow-up appointments:      Follow-up Information     Follow up With Details Comments Contact Info     Chavez Bowens MD Call As needed, If symptoms worsen [address]  [phone]     Mansoor Garcia Call   1      Call [phone]- interventional radiology at [facility] in 6 weeks for catheter exchange, call if any concern physical cholecystostomy tube.     Call to [address]. Reached nurse, Veronica, and identified self/role. Per nurse patient seems tired but no problems or concerns are noted at this time. The wife is in the room with the patient this morning. The nurse did not have time to reconcile medications. Return call to speak with nurse. On hold over 5 minutes. Nurse not available. Will follow.

## 2017-08-30 NOTE — PROGRESS NOTES
Transitions of Care Coordination    Yesy Carter was admitted to [facility] 8/11-8/14/17 for  sepsis/community acquired pneumonia and transferred to [address] [place] [address].     The patient was readmitted to [facility] 8/16-8/21/17 for acute cholecystitis and underwent a cholecystostomy. The patient was transferred back to [address] [place] [address].       Call to [address] [place] [address]. Reached , Vinetta Lesches, and identified self/role. Per SW no problems or concerns noted. Patient is progressing with therapy. Tentative DC date is set for 9/9/17. Will follow.

## 2017-08-30 NOTE — PROGRESS NOTES
[address]  [address], [address]  [facility], [address]  Phone: [phone]  Fax: [phone]        Gwen Wellington  : 1935  PCP: Raiza Christy MD    PROGRESS NOTE      ASSESSMENT AND PLAN    Diagnoses and all orders for this visit:    1. DISH (diffuse idiopathic skeletal hyperostosis)  -     oxyCODONE-acetaminophen (PERCOCET 10)  mg per tablet; Take 1 Tab by mouth every eight (8) hours as needed (for Breakthrough pain). Max Daily Amount: 3 Tabs. Indications: Pain    Other orders  -     oxyCODONE ER (OXYCONTIN) 20 mg ER tablet; Take 1 Tab by mouth every twelve (12) hours. Max Daily Amount: 40 mg. Indications: Chronic Pain  -     oxyCODONE ER (OXYCONTIN) 20 mg ER tablet; Take 1 Tab by mouth every twelve (12) hours. Max Daily Amount: 40 mg. Indications: Chronic Pain  -     oxyCODONE-acetaminophen (PERCOCET)  mg per tablet; Take 1 Tab by mouth every eight (8) hours as needed for Pain ((breakthrough pain). Max Daily Amount: 3 Tabs. Indications: Pain  -     oxyCODONE-acetaminophen (PERCOCET)  mg per tablet; Take 1 Tab by mouth every eight (8) hours as needed for Pain ((breakthrough pain)). Max Daily Amount: 3 Tabs. Indications: Pain    1. Continue Percocet and Oxycontin. 2. Advised to stay active as tolerated. Follow-up Disposition:  Return in about 3 months (around 2017) for med f/u. Risks and benefits of ongoing opiate therapy have been reviewed with the patient.  is appropriate. UDS results have been consistent. No pain behaviors. Pt has a good risk to benefit ratio which allows the pt to function in a home environment without side effects. HISTORY OF PRESENT ILLNESS  Suze Brownswmaryanne Lopez. is a 80 y.o. male. Pt presents to the office for a f/u visit for back pain. Pt was hospitalized for sepsis, pneumonia, and blockage of gallbladder about 2 weeks ago.  He is not considered a surgical candidate, now has biliary drain. He continues to have his back pain. He has been taken off his medication for memory by Dr. Segovia Me, pt and wife initially went to the ED for his confusion. No saddle paresthesias. He is on Percocet  mg for BTP and Oxycontin 20 mg ER q12. Pt denies any dizziness, confusion, uncontrolled constipation, and cravings due to controlled substances. Denies persistent fevers, chills, weight changes, neurogenic bowel or bladder symptoms. PMHx of DISH and cardiac stents.  reviewed and is consistent. Pain Assessment  8/30/2017   Location of Pain Back   Location Modifiers -   Severity of Pain 6   Quality of Pain Aching   Quality of Pain Comment -   Duration of Pain -   Frequency of Pain Constant   Date Pain First Started -   Aggravating Factors -   Aggravating Factors Comment -   Limiting Behavior -   Relieving Factors -   Relieving Factors Comment -   Result of Injury No       PAST MEDICAL HISTORY   Past Medical History:   Diagnosis Date    3-vessel coronary artery disease 02/13/2004    Tech limited. Low normal to mildly depressed LV systolic function. LVH. DDfx. LAE. PASP 25-30. 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 mLAD 100%. CX diffuse nonobstructive. OM1 patent stent. pRCA 100%. Dbl seq SVG to D1, OM1 100%. SVG to pRPDA 99% (3.5 x 23-mm Xience stent). Patent mid segment stent. LIMA to LAD ok.  Cardiac echocardiogram 11/16/2014    [facility]:  EF 55-60%. DDfx. Normal RVSP. Mild TR. Neg bubble study for atrial shunt. No significant valvular pathology.  Cardiac nuclear imaging test, high risk 06/15/2012    Lg area of mod mid to distal anterior ischemia. Lg area of inferior ischemia. EF 57%. Mild mid-distal anterior, inferior hypk.   High risk pharm stress test.    Cervical spine pain     Deviated septum     s/p corrective surgery 7/1/2010    Disc disease, degenerative, cervical     joint pain, back pain    Dysphagia, unspecified     Essential hypertension, benign     Gout with other specified manifestations     Head injury     frequent headaches    Hypercholesterolemia     Insomnia, unspecified     Lumbar canal stenosis     Meniere's disease     Myocardial infarction, anterior wall (HCC)     DIEGO (obstructive sleep apnea)     on CPAP    S/P diskectomy 7/5/07    cervical    Spinal stenosis, lumbar region, without neurogenic claudication     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     Unspecified sinusitis (chronic)     Ventral hernia, unspecified, without mention of obstruction or gangrene     S/p repair 6/2003       Past Surgical History:   Procedure Laterality Date    CARDIAC CATHETERIZATION  3/15/2017         CARDIAC SURG PROCEDURE UNLIST  1/04    angioplasty of a proximal left anterior descending    CARDIAC SURG PROCEDURE UNLIST  6/2/08    angioplasty and stenting of totally obstructed saphenous vein graft to the posterior descending artery    CORONARY ARTERY ANGIOGRAM  3/15/2017         HX APPENDECTOMY      HX CATARACT REMOVAL      bilateral    HX CERVICAL DISKECTOMY  7/07    and fusion    HX CORONARY ARTERY BYPASS GRAFT  4/14/94    X4, with LIMA to the LAD, double sequential to the diagonal and circumflex and single to the main right coronary artery    HX CORONARY STENT PLACEMENT  6/20/2012    HX GI  1992    two feet of colon removed    HX HEART CATHETERIZATION  6/02/08    HX HEART CATHETERIZATION  6/20/2012    HX HEENT  7/1/10    deviated nasal septum surgery    HX HERNIA REPAIR  7/2003    two prior hernia repairs in the past    HX OTHER SURGICAL      HX PTCA  6/20/2012    HX TONSILLECTOMY      as a child    [place] [address]      for crushed disk   .       MEDICATIONS    Current Outpatient Prescriptions Medication Sig Dispense Refill    indapamide (LOZOL) 1.25 mg tablet Take  by mouth daily.  oxyCODONE ER (OXYCONTIN) 20 mg ER tablet Take 1 Tab by mouth every twelve (12) hours. Max Daily Amount: 40 mg. Indications: Chronic Pain 10 Tab 0    oxyCODONE-acetaminophen (PERCOCET 10)  mg per tablet Take 1 Tab by mouth every eight (8) hours as needed (for Breakthrough pain). Max Daily Amount: 3 Tabs. Indications: Pain 10 Tab 0    polyethylene glycol (MIRALAX) 17 gram packet Take 1 Packet by mouth daily for 10 days. 10 Packet 0    naloxone (NARCAN) 4 mg/actuation spry Use 1 spray intranasally into 1 nostril. 1 Box 0    simvastatin (ZOCOR) 40 mg tablet TAKE 1 TABLET BY MOUTH ONCE DAILY 30 Tab 6    potassium chloride (KLOR-CON) 10 mEq tablet TAKE 1 TABLET BY MOUTH 2 TIMES A DAY 60 Tab 1    allopurinol (ZYLOPRIM) 300 mg tablet TAKE 1 TABLET BY MOUTH ONCE DAILY 90 Tab 3    aspirin delayed-release 81 mg tablet Take 81 mg by mouth two (2) times a day.  metoprolol tartrate (LOPRESSOR) 50 mg tablet TAKE 1 TABLET BY MOUTH 2 TIMES A DAY 60 Tab 6    NITROSTAT 0.4 mg SL tablet DISSOLVE 1 TABLET UNDER THE TONGUE EVERY 5 MINUTES AS NEEDED 25 Tab 0    MULTIVITAMINS W-MINERALS/LUT (CENTRUM SILVER PO) Take 1 Tab by mouth daily.  amoxicillin-clavulanate (AUGMENTIN) 875-125 mg per tablet Take 1 Tab by mouth every twelve (12) hours for 10 days. 20 Tab 0    lactobacillus-acidophilus (LACTINEX) 100 million cell grpk Take 1 Packet by mouth two (2) times daily (with meals) for 10 days. 20 Packet 0    mirtazapine (REMERON SOL-TAB) 15 mg disintegrating tablet 1 tablet at bedtime 30 Tab 3    niacin (NIASPAN) 1,000 mg Tb24 tab Take 1 tablet by mouth nightly. 30 tablet 11        ALLERGIES  No Known Allergies       SOCIAL HISTORY    Social History     Social History    Marital status:      Spouse name: N/A    Number of children: N/A    Years of education: N/A     Occupational History    Not on file. Social History Main Topics    Smoking status: Never Smoker    Smokeless tobacco: Never Used    Alcohol use Yes    Drug use: No    Sexual activity: No     Other Topics Concern    Not on file     Social History Narrative       FAMILY HISTORY  Family History   Problem Relation Age of Onset    Heart Disease Father     Stroke Father        REVIEW OF SYSTEMS  Review of Systems   Constitutional: Negative for chills, fever and weight loss. Respiratory: Negative for shortness of breath. Cardiovascular: Negative for chest pain. Gastrointestinal: Negative for constipation. Negative for fecal incontinence   Genitourinary: Negative for dysuria. Negative for urinary incontinence   Musculoskeletal:        Per HPI   Skin: Negative for rash. Neurological: Negative for dizziness, tingling, tremors, focal weakness and headaches. Endo/Heme/Allergies: Does not bruise/bleed easily. Psychiatric/Behavioral: The patient does not have insomnia. PHYSICAL EXAMINATION  Visit Vitals    /59    Pulse 78    Temp 97.6 °F (36.4 °C) (Oral)    Resp 18    Ht 5' 11\" (1.803 m)    Wt 196 lb 12.8 oz (89.3 kg)    SpO2 100%    BMI 27.45 kg/m2         Accompanied by spouse. Constitutional:  Well developed, well nourished, in no acute distress. Psychiatric: Affect and mood are appropriate. Integumentary: No rashes or abrasions noted on exposed areas. Cardiovascular/Peripheral Vascular: Intact l pulses. No peripheral edema is noted. Lymphatic:  No evidence of lymphedema. No cervical lymphadenopathy. SPINE/MUSCULOSKELETAL EXAM    Lumbar spine:  No rash, ecchymosis, or gross obliquity. No fasciculations. No focal atrophy is noted. Tenderness to palpation L4-5. No tenderness to palpation at the sciatic notch. SI joints non-tender. Trochanters non tender. Sensation grossly intact to light touch.       MOTOR:       Hip Flex  Quads Hamstrings Ankle DF EHL Ankle PF   Right +4/5 +4/5 +4/5 +4/5 +4/5 +4/5   Left +4/5 +4/5 +4/5 +4/5 +4/5 +4/5     Ambulation without assistive device. FWB. Written by Laly Page, as dictated by Juanita Perez MD.    I, Dr. Juanita Perez MD, confirm that all documentation is accurate. Mr. Nasra Lara may have a reminder for a \"due or due soon\" health maintenance. I have asked that he contact his primary care provider for follow-up on this health maintenance.

## 2017-09-06 NOTE — PROGRESS NOTES
Transitions of Care Coordination    Catie Banuelos was admitted to [facility] 8/11-8/14/17 for  sepsis/community acquired pneumonia and transferred to [place] [address]  The patient was readmitted to [facility] 8/16-8/21/17 for acute cholecystitis and underwent a cholecystostomy.  The patient was transferred back to [address] [place] [address]. Call to [facility]. Left message for , [facility], re DC plans for the patient. CB to SNF. Reached , [facility], and identified self. Patient is set to discharge to home on 9/9/17 with [address] home care. An appointment was scheduled with Dr. Vincenzo Barrera on 9/15/17 at [phone]. Will follow.

## 2017-09-11 NOTE — PROGRESS NOTES
Transitions of Care Coordination    Reyes Solo was admitted to [facility] 8/11-8/14/17 for  sepsis/community acquired pneumonia and transferred to [address] [place] [address].     The patient was readmitted to [facility] 8/16-8/21/17 for acute cholecystitis and underwent a cholecystostomy. The patient was transferred back to [address] [place] [address] 8/21-9/9/17. Th e patient was discharged to home with [facility] on 9/10/17. Reached wife (HIPPA verified) and identified self/role. Per wife patient seems to be doing OK. Medications reconciled and patient is taking as directed. Wife manages medications. Appetite is fair. Patient has a cholecystostomy tube in place and wife is independent with emptying drainage bag daily. DME:  None  ADLS:  Supportive wife assists    Appointments:  Dr. Scar Ewing 9/15/17 at [phone]                            Dr. Uyen Bejarano, surgeon 9/29/17    Wife is aware of upcoming appointments and will provide transportation. Opportunity to ask questions was provided. Contact information was given for future questions, concerns or assistance. Will follow.

## 2017-09-15 NOTE — PROGRESS NOTES
1. Have you been to the ER, urgent care clinic since your last visit? Hospitalized since your last visit? [facility] and [address] [place] [address] Road - Pneumonia and gall stones    2. Have you seen or consulted any other health care providers outside of the [address] [place] [address] since your last visit? Include any pap smears or colon screening.  No

## 2017-09-15 NOTE — PATIENT INSTRUCTIONS
Health Maintenance Due   Topic Date Due    DTaP/Tdap/Td  (1 - Tdap) 12/13/1956    Flu Vaccine  08/01/2017    Pneumococcal Vaccine (2 of 2 - PCV13) 09/07/2017

## 2017-09-17 PROBLEM — K81.1 CHRONIC CHOLECYSTITIS: Status: ACTIVE | Noted: 2017-01-01

## 2017-09-18 NOTE — PROGRESS NOTES
The patient presents to the office today with a chief complain of confusion and to follow up from a recent hospitalization. HPI:    The patient was recently hospitalized at [facility] for pneumonia - then to [address] for rehab - then to [facility] for acute cholecystitis - then back to [address] . The patient was discharged on 9/10/17 and contacted by Harvinder Guillermo on 9/11/2017 for follow up. An appointment was made for the patient to see me today. The \"hot\" gallbladder was treated with external drainage. The gall bladder is quiet but the patient has remained quite confused. The confusion is worse at night. At times the patient is combative. The patient has coronary artery disease. He denies chest pain or dyspnea. Review of Systems   Respiratory: Negative for shortness of breath. Cardiovascular: Negative for chest pain and leg swelling. No Known Allergies    Current Outpatient Prescriptions   Medication Sig Dispense Refill    donepezil (ARICEPT) 10 mg tablet Take 1 Tab by mouth nightly. 30 Tab 0    haloperidol (HALDOL) 0.5 mg tablet 1 tablet at bedtime 30 Tab 2    potassium chloride (KLOR-CON) 10 mEq tablet TAKE 1 TABLET BY MOUTH 2 TIMES A DAY 60 Tab 1    indapamide (LOZOL) 1.25 mg tablet Take  by mouth daily.  [START ON 10/14/2017] oxyCODONE ER (OXYCONTIN) 20 mg ER tablet Take 1 Tab by mouth every twelve (12) hours. Max Daily Amount: 40 mg. Indications: Chronic Pain 60 Tab 0    oxyCODONE-acetaminophen (PERCOCET)  mg per tablet Take 1 Tab by mouth every eight (8) hours as needed for Pain ((breakthrough pain)). Max Daily Amount: 3 Tabs.  Indications: Pain 90 Tab 0    mirtazapine (REMERON SOL-TAB) 15 mg disintegrating tablet 1 tablet at bedtime 30 Tab 3    simvastatin (ZOCOR) 40 mg tablet TAKE 1 TABLET BY MOUTH ONCE DAILY 30 Tab 6    allopurinol (ZYLOPRIM) 300 mg tablet TAKE 1 TABLET BY MOUTH ONCE DAILY 90 Tab 3  aspirin delayed-release 81 mg tablet Take 81 mg by mouth two (2) times a day.  metoprolol tartrate (LOPRESSOR) 50 mg tablet TAKE 1 TABLET BY MOUTH 2 TIMES A DAY 60 Tab 6    MULTIVITAMINS W-MINERALS/LUT (CENTRUM SILVER PO) Take 1 Tab by mouth daily. Past Medical History:   Diagnosis Date    3-vessel coronary artery disease 02/13/2004    Tech limited. Low normal to mildly depressed LV systolic function. LVH. DDfx. LAE. PASP 25-30. 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 mLAD 100%. CX diffuse nonobstructive. OM1 patent stent. pRCA 100%. Dbl seq SVG to D1, OM1 100%. SVG to pRPDA 99% (3.5 x 23-mm Xience stent). Patent mid segment stent. LIMA to LAD ok.  Cardiac echocardiogram 11/16/2014    [facility]:  EF 55-60%. DDfx. Normal RVSP. Mild TR. Neg bubble study for atrial shunt. No significant valvular pathology.  Cardiac nuclear imaging test, high risk 06/15/2012    Lg area of mod mid to distal anterior ischemia. Lg area of inferior ischemia. EF 57%. Mild mid-distal anterior, inferior hypk.   High risk pharm stress test.    Cervical spine pain     Deviated septum     s/p corrective surgery 7/1/2010    Disc disease, degenerative, cervical     joint pain, back pain    Dysphagia, unspecified     Essential hypertension, benign     Gout with other specified manifestations     Head injury     frequent headaches    Hypercholesterolemia     Insomnia, unspecified     Lumbar canal stenosis     Meniere's disease     Myocardial infarction, anterior wall (HCC)     DIEGO (obstructive sleep apnea)     on CPAP    S/P diskectomy 7/5/07    cervical    Spinal stenosis, lumbar region, without neurogenic claudication     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     Unspecified sinusitis (chronic)     Ventral hernia, unspecified, without mention of obstruction or gangrene     S/p repair 6/2003       Past Surgical History:   Procedure Laterality Date    CARDIAC CATHETERIZATION  3/15/2017         CARDIAC SURG PROCEDURE UNLIST  1/04    angioplasty of a proximal left anterior descending    CARDIAC SURG PROCEDURE UNLIST  6/2/08    angioplasty and stenting of totally obstructed saphenous vein graft to the posterior descending artery    CORONARY ARTERY ANGIOGRAM  3/15/2017         HX APPENDECTOMY      HX CATARACT REMOVAL      bilateral    HX CERVICAL DISKECTOMY  7/07    and fusion    HX CORONARY ARTERY BYPASS GRAFT  4/14/94    X4, with LIMA to the LAD, double sequential to the diagonal and circumflex and single to the main right coronary artery    HX CORONARY STENT PLACEMENT  6/20/2012    HX GI  1992    two feet of colon removed    HX HEART CATHETERIZATION  6/02/08    HX HEART CATHETERIZATION  6/20/2012    HX HEENT  7/1/10    deviated nasal septum surgery    HX HERNIA REPAIR  7/2003    two prior hernia repairs in the past    HX OTHER SURGICAL      HX PTCA  6/20/2012    HX TONSILLECTOMY      as a child    [facility]      for crushed disk       Social History     Social History    Marital status:      Spouse name: N/A    Number of children: N/A    Years of education: N/A     Occupational History    Not on file.      Social History Main Topics    Smoking status: Never Smoker    Smokeless tobacco: Never Used    Alcohol use Yes    Drug use: No    Sexual activity: No     Other Topics Concern    Not on file     Social History Narrative       Patient does have an advanced directive on file    Visit Vitals    BP 98/54 (BP 1 Location: Left arm, BP Patient Position: Sitting)    Pulse 64    Temp 98.7 °F (37.1 °C) (Tympanic)    Resp 20    Ht 5' 11\" (1.803 m)    Wt 200 lb (90.7 kg)    SpO2 98%    BMI 27.89 kg/m2       Physical Exam   Neck: Carotid bruit is not present. Cardiovascular: Regular rhythm. Exam reveals no gallop. No murmur heard. Pulmonary/Chest: He has no wheezes. He has no rales. Abdominal: Soft. He exhibits no distension. There is no tenderness. External drainage is in place   Musculoskeletal: He exhibits no edema. Neurological:   The patient appears to recognize the examiner but he speaks very little       BMI:  OK    Admission on 08/16/2017, Discharged on 08/21/2017   No results displayed because visit has over 200 results. Admission on 08/11/2017, Discharged on 08/14/2017   No results displayed because visit has over 200 results. Hospital Outpatient Visit on 07/28/2017   Component Date Value Ref Range Status    WBC 07/28/2017 7.0  4.6 - 13.2 K/uL Final    RBC 07/28/2017 4.37* 4.70 - 5.50 M/uL Final    HGB 07/28/2017 13.5  13.0 - 16.0 g/dL Final    HCT 07/28/2017 41.3  36.0 - 48.0 % Final    MCV 07/28/2017 94.5  74.0 - 97.0 FL Final    MCH 07/28/2017 30.9  24.0 - 34.0 PG Final    MCHC 07/28/2017 32.7  31.0 - 37.0 g/dL Final    RDW 07/28/2017 13.6  11.6 - 14.5 % Final    PLATELET 43/91/7538 218  135 - 420 K/uL Final    MPV 07/28/2017 11.1  9.2 - 11.8 FL Final    NEUTROPHILS 07/28/2017 51  40 - 73 % Final    LYMPHOCYTES 07/28/2017 35  21 - 52 % Final    MONOCYTES 07/28/2017 8  3 - 10 % Final    EOSINOPHILS 07/28/2017 6* 0 - 5 % Final    BASOPHILS 07/28/2017 0  0 - 2 % Final    ABS. NEUTROPHILS 07/28/2017 3.5  1.8 - 8.0 K/UL Final    ABS. LYMPHOCYTES 07/28/2017 2.4  0.9 - 3.6 K/UL Final    ABS. MONOCYTES 07/28/2017 0.6  0.05 - 1.2 K/UL Final    ABS. EOSINOPHILS 07/28/2017 0.4  0.0 - 0.4 K/UL Final    ABS.  BASOPHILS 07/28/2017 0.0  0.0 - 0.06 K/UL Final    DF 07/28/2017 AUTOMATED    Final    Sodium 07/28/2017 143  136 - 145 mmol/L Final    Potassium 07/28/2017 4.3  3.5 - 5.5 mmol/L Final    Chloride 07/28/2017 103  100 - 108 mmol/L Final    CO2 07/28/2017 33* 21 - 32 mmol/L Final    Anion gap 07/28/2017 7  3.0 - 18 mmol/L Final    Glucose 07/28/2017 123* 74 - 99 mg/dL Final    BUN 07/28/2017 11  7.0 - 18 MG/DL Final    Creatinine 07/28/2017 0.78  0.6 - 1.3 MG/DL Final    BUN/Creatinine ratio 07/28/2017 14  12 - 20   Final    GFR est AA 07/28/2017 >60  >60 ml/min/1.73m2 Final    GFR est non-AA 07/28/2017 >60  >60 ml/min/1.73m2 Final    Comment: (NOTE)  Estimated GFR is calculated using the Modification of Diet in Renal   Disease (MDRD) Study equation, reported for both  Americans   (GFRAA) and non- Americans (GFRNA), and normalized to 1.73m2   body surface area. The physician must decide which value applies to   the patient. The MDRD study equation should only be used in   individuals age 25 or older. It has not been validated for the   following: pregnant women, patients with serious comorbid conditions,   or on certain medications, or persons with extremes of body size,   muscle mass, or nutritional status.  Calcium 07/28/2017 8.8  8.5 - 10.1 MG/DL Final    Bilirubin, total 07/28/2017 0.4  0.2 - 1.0 MG/DL Final    ALT (SGPT) 07/28/2017 17  16 - 61 U/L Final    AST (SGOT) 07/28/2017 12* 15 - 37 U/L Final    Alk.  phosphatase 07/28/2017 59  45 - 117 U/L Final    Protein, total 07/28/2017 6.3* 6.4 - 8.2 g/dL Final    Albumin 07/28/2017 3.6  3.4 - 5.0 g/dL Final    Globulin 07/28/2017 2.7  2.0 - 4.0 g/dL Final    A-G Ratio 07/28/2017 1.3  0.8 - 1.7   Final    TSH 07/28/2017 0.82  0.36 - 3.74 uIU/mL Final   Hospital Outpatient Visit on 07/18/2017   Component Date Value Ref Range Status    Special Requests: 07/18/2017 LEFT CHEEK WOUND   Final    GRAM STAIN 07/18/2017 MODERATE WBC'S    Final    GRAM STAIN 07/18/2017 FEW GRAM POSITIVE COCCI IN PAIRS    Final    Culture result: 07/18/2017 MODERATE STAPHYLOCOCCUS AUREUS*   Final    Culture result: 07/18/2017 MODERATE STAPHYLOCOCCUS SPECIES, COAGULASE NEGATIVE (TWO MORPHOTYPES)*   Final       .No results found for any visits on 09/15/17. Assessment / Plan:        ICD-10-CM ICD-9-CM    1. Encephalopathy G93.40 348.30    2. Atherosclerosis of native coronary artery of native heart without angina pectoris I25.10 414.01    3. Essential hypertension, benign I10 401.1    4. Chronic cholecystitis K81.1 575.11      Restart Aricept  Add Haldol  he was advised to continue his maintenance medications     Medications were reconciled with the patient and the hospital record during this visit      Follow-up Disposition:  Return in about 2 weeks (around 9/29/2017). I asked Suze Garcia if he has any questions and I answered the questions. Suze Goodman. states that he understands the treatment plan and agrees with the treatment plan

## 2017-09-18 NOTE — PROGRESS NOTES
Transitions of Care Coordination    Vibha Ramos was admitted to [facility] 8/11-8/14/17 for  sepsis/community acquired pneumonia and transferred to [place] [address]  The patient was readmitted to [facility] 8/16-8/21/17 for acute cholecystitis and underwent a cholecystostomy.  The patient was transferred back to [address] [place] [address] 8/21-9/9/17. Th e patient was discharged to home with [facility] with [facility] on 9/10/17. Mr. Pat Lara attended an appointment with Dr. Cassandra Hill on 9/15/17. Per visit note the biliary drain remains in place. VSS. Patient was noted to be confused and at times combative. Aricept and Haldol were prescribed. The patient was instructed to return to care in about 2 weeks. Will follow.

## 2017-09-23 NOTE — PROGRESS NOTES
The patient presents to the office today with the chief complaint of mental status change    HPI    The patient arrived today for lab work. He appeared quite confused and more lethargic. When I was informed of his condition form the lab personnel the patient was placed in an examination room for evaluation. The patient states that the patient has been quite lethargic since this AM.  He is speaking very little but he appears to his wife to be uncomfortable in his right upper quadrant of his abdomen. Review of Systems   Respiratory: Negative for shortness of breath. Cardiovascular: Negative for chest pain and leg swelling. Gastrointestinal: Positive for abdominal pain (As HPI). Neurological:        Worsening confusion as per HPI       No Known Allergies    Current Outpatient Prescriptions   Medication Sig Dispense Refill    haloperidol (HALDOL) 0.5 mg tablet 1 tablet at bedtime 30 Tab 2    potassium chloride (KLOR-CON) 10 mEq tablet TAKE 1 TABLET BY MOUTH 2 TIMES A DAY 60 Tab 1    indapamide (LOZOL) 1.25 mg tablet Take  by mouth daily.  [START ON 10/14/2017] oxyCODONE ER (OXYCONTIN) 20 mg ER tablet Take 1 Tab by mouth every twelve (12) hours. Max Daily Amount: 40 mg. Indications: Chronic Pain 60 Tab 0    oxyCODONE-acetaminophen (PERCOCET)  mg per tablet Take 1 Tab by mouth every eight (8) hours as needed for Pain ((breakthrough pain)). Max Daily Amount: 3 Tabs. Indications: Pain 90 Tab 0    simvastatin (ZOCOR) 40 mg tablet TAKE 1 TABLET BY MOUTH ONCE DAILY 30 Tab 6    allopurinol (ZYLOPRIM) 300 mg tablet TAKE 1 TABLET BY MOUTH ONCE DAILY 90 Tab 3    aspirin delayed-release 81 mg tablet Take 81 mg by mouth two (2) times a day.  metoprolol tartrate (LOPRESSOR) 50 mg tablet TAKE 1 TABLET BY MOUTH 2 TIMES A DAY 60 Tab 6    MULTIVITAMINS W-MINERALS/LUT (CENTRUM SILVER PO) Take 1 Tab by mouth daily.  donepezil (ARICEPT) 10 mg tablet Take 1 Tab by mouth nightly.  30 Tab 0 Past Medical History:   Diagnosis Date    3-vessel coronary artery disease 02/13/2004    Tech limited. Low normal to mildly depressed LV systolic function. LVH. DDfx. LAE. PASP 25-30. 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 mLAD 100%. CX diffuse nonobstructive. OM1 patent stent. pRCA 100%. Dbl seq SVG to D1, OM1 100%. SVG to pRPDA 99% (3.5 x 23-mm Xience stent). Patent mid segment stent. LIMA to LAD ok.  Cardiac echocardiogram 11/16/2014    [facility]:  EF 55-60%. DDfx. Normal RVSP. Mild TR. Neg bubble study for atrial shunt. No significant valvular pathology.  Cardiac nuclear imaging test, high risk 06/15/2012    Lg area of mod mid to distal anterior ischemia. Lg area of inferior ischemia. EF 57%. Mild mid-distal anterior, inferior hypk.   High risk pharm stress test.    Cervical spine pain     Deviated septum     s/p corrective surgery 7/1/2010    Disc disease, degenerative, cervical     joint pain, back pain    Dysphagia, unspecified     Essential hypertension, benign     Gout with other specified manifestations     Head injury     frequent headaches    Hypercholesterolemia     Insomnia, unspecified     Lumbar canal stenosis     Meniere's disease     Myocardial infarction, anterior wall (HCC)     DIEGO (obstructive sleep apnea)     on CPAP    S/P diskectomy 7/5/07    cervical    Spinal stenosis, lumbar region, without neurogenic claudication     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     Unspecified sinusitis (chronic)     Ventral hernia, unspecified, without mention of obstruction or gangrene     S/p repair 6/2003       Past Surgical History:   Procedure Laterality Date    CARDIAC CATHETERIZATION  3/15/2017         CARDIAC SURG PROCEDURE UNLIST  1/04 angioplasty of a proximal left anterior descending    CARDIAC SURG PROCEDURE UNLIST  6/2/08    angioplasty and stenting of totally obstructed saphenous vein graft to the posterior descending artery    CORONARY ARTERY ANGIOGRAM  3/15/2017         HX APPENDECTOMY      HX CATARACT REMOVAL      bilateral    HX CERVICAL DISKECTOMY  7/07    and fusion    HX CORONARY ARTERY BYPASS GRAFT  4/14/94    X4, with LIMA to the LAD, double sequential to the diagonal and circumflex and single to the main right coronary artery    HX CORONARY STENT PLACEMENT  6/20/2012    HX GI  1992    two feet of colon removed    HX HEART CATHETERIZATION  6/02/08    HX HEART CATHETERIZATION  6/20/2012    HX HEENT  7/1/10    deviated nasal septum surgery    HX HERNIA REPAIR  7/2003    two prior hernia repairs in the past    HX OTHER SURGICAL      HX PTCA  6/20/2012    HX TONSILLECTOMY      as a child    [address]      [address] crushed disk       Social History     Social History    Marital status:      Spouse name: N/A    Number of children: N/A    Years of education: N/A     Occupational History    Not on file. Social History Main Topics    Smoking status: Never Smoker    Smokeless tobacco: Never Used    Alcohol use Yes    Drug use: No    Sexual activity: No     Other Topics Concern    Not on file     Social History Narrative       Patient does have an advanced directive on file    Visit Vitals    /76 (BP 1 Location: Left arm, BP Patient Position: Supine)    Pulse 78       Physical Exam   Cardiovascular: Normal rate and regular rhythm. Exam reveals no gallop. No murmur heard. Pulmonary/Chest: He has no wheezes. He has no rales. Abdominal: Soft. He exhibits no distension. There is no tenderness. Neurological:   Patient was quite lethargic during the exam - frequently dosing off.   He was nonverbal during the exam       BMI:  [facility] Visit on 09/21/2017 Component Date Value Ref Range Status    Sodium 09/21/2017 144  136 - 145 mmol/L Final    Potassium 09/21/2017 4.4  3.5 - 5.5 mmol/L Final    Chloride 09/21/2017 106  100 - 108 mmol/L Final    CO2 09/21/2017 28  21 - 32 mmol/L Final    Anion gap 09/21/2017 10  3.0 - 18 mmol/L Final    Glucose 09/21/2017 113* 74 - 99 mg/dL Final    BUN 09/21/2017 16  7.0 - 18 MG/DL Final    Creatinine 09/21/2017 0.72  0.6 - 1.3 MG/DL Final    BUN/Creatinine ratio 09/21/2017 22* 12 - 20   Final    GFR est AA 09/21/2017 >60  >60 ml/min/1.73m2 Final    GFR est non-AA 09/21/2017 >60  >60 ml/min/1.73m2 Final    Comment: (NOTE)  Estimated GFR is calculated using the Modification of Diet in Renal   Disease (MDRD) Study equation, reported for both  Americans   (GFRAA) and non- Americans (GFRNA), and normalized to 1.73m2   body surface area. The physician must decide which value applies to   the patient. The MDRD study equation should only be used in   individuals age 25 or older. It has not been validated for the   following: pregnant women, patients with serious comorbid conditions,   or on certain medications, or persons with extremes of body size,   muscle mass, or nutritional status.  Calcium 09/21/2017 9.0  8.5 - 10.1 MG/DL Final    Bilirubin, total 09/21/2017 0.4  0.2 - 1.0 MG/DL Final    ALT (SGPT) 09/21/2017 16  16 - 61 U/L Final    AST (SGOT) 09/21/2017 12* 15 - 37 U/L Final    Alk.  phosphatase 09/21/2017 65  45 - 117 U/L Final    Protein, total 09/21/2017 6.2* 6.4 - 8.2 g/dL Final    Albumin 09/21/2017 3.5  3.4 - 5.0 g/dL Final    Globulin 09/21/2017 2.7  2.0 - 4.0 g/dL Final    A-G Ratio 09/21/2017 1.3  0.8 - 1.7   Final    WBC 09/21/2017 12.1  4.6 - 13.2 K/uL Final    RBC 09/21/2017 4.06* 4.70 - 5.50 M/uL Final    HGB 09/21/2017 12.2* 13.0 - 16.0 g/dL Final    HCT 09/21/2017 38.0  36.0 - 48.0 % Final    MCV 09/21/2017 93.6  74.0 - 97.0 FL Final    MCH 09/21/2017 30.0  24.0 - 34.0 PG Final    MCHC 09/21/2017 32.1  31.0 - 37.0 g/dL Final    RDW 09/21/2017 13.2  11.6 - 14.5 % Final    PLATELET 92/35/7777 606  135 - 420 K/uL Final    MPV 09/21/2017 11.2  9.2 - 11.8 FL Final    NEUTROPHILS 09/21/2017 77* 40 - 73 % Final    LYMPHOCYTES 09/21/2017 14* 21 - 52 % Final    MONOCYTES 09/21/2017 6  3 - 10 % Final    EOSINOPHILS 09/21/2017 3  0 - 5 % Final    BASOPHILS 09/21/2017 0  0 - 2 % Final    ABS. NEUTROPHILS 09/21/2017 9.3* 1.8 - 8.0 K/UL Final    ABS. LYMPHOCYTES 09/21/2017 1.7  0.9 - 3.6 K/UL Final    ABS. MONOCYTES 09/21/2017 0.8  0.05 - 1.2 K/UL Final    ABS. EOSINOPHILS 09/21/2017 0.4  0.0 - 0.4 K/UL Final    ABS. BASOPHILS 09/21/2017 0.0  0.0 - 0.06 K/UL Final    DF 09/21/2017 AUTOMATED    Final   Admission on 08/16/2017, Discharged on 08/21/2017   No results displayed because visit has over 200 results. Admission on 08/11/2017, Discharged on 08/14/2017   No results displayed because visit has over 200 results. Hospital Outpatient Visit on 07/28/2017   Component Date Value Ref Range Status    WBC 07/28/2017 7.0  4.6 - 13.2 K/uL Final    RBC 07/28/2017 4.37* 4.70 - 5.50 M/uL Final    HGB 07/28/2017 13.5  13.0 - 16.0 g/dL Final    HCT 07/28/2017 41.3  36.0 - 48.0 % Final    MCV 07/28/2017 94.5  74.0 - 97.0 FL Final    MCH 07/28/2017 30.9  24.0 - 34.0 PG Final    MCHC 07/28/2017 32.7  31.0 - 37.0 g/dL Final    RDW 07/28/2017 13.6  11.6 - 14.5 % Final    PLATELET 16/96/8594 351  135 - 420 K/uL Final    MPV 07/28/2017 11.1  9.2 - 11.8 FL Final    NEUTROPHILS 07/28/2017 51  40 - 73 % Final    LYMPHOCYTES 07/28/2017 35  21 - 52 % Final    MONOCYTES 07/28/2017 8  3 - 10 % Final    EOSINOPHILS 07/28/2017 6* 0 - 5 % Final    BASOPHILS 07/28/2017 0  0 - 2 % Final    ABS. NEUTROPHILS 07/28/2017 3.5  1.8 - 8.0 K/UL Final    ABS. LYMPHOCYTES 07/28/2017 2.4  0.9 - 3.6 K/UL Final    ABS. MONOCYTES 07/28/2017 0.6  0.05 - 1.2 K/UL Final    ABS.  EOSINOPHILS 07/28/2017 0.4  0.0 - 0.4 K/UL Final    ABS. BASOPHILS 07/28/2017 0.0  0.0 - 0.06 K/UL Final    DF 07/28/2017 AUTOMATED    Final    Sodium 07/28/2017 143  136 - 145 mmol/L Final    Potassium 07/28/2017 4.3  3.5 - 5.5 mmol/L Final    Chloride 07/28/2017 103  100 - 108 mmol/L Final    CO2 07/28/2017 33* 21 - 32 mmol/L Final    Anion gap 07/28/2017 7  3.0 - 18 mmol/L Final    Glucose 07/28/2017 123* 74 - 99 mg/dL Final    BUN 07/28/2017 11  7.0 - 18 MG/DL Final    Creatinine 07/28/2017 0.78  0.6 - 1.3 MG/DL Final    BUN/Creatinine ratio 07/28/2017 14  12 - 20   Final    GFR est AA 07/28/2017 >60  >60 ml/min/1.73m2 Final    GFR est non-AA 07/28/2017 >60  >60 ml/min/1.73m2 Final    Comment: (NOTE)  Estimated GFR is calculated using the Modification of Diet in Renal   Disease (MDRD) Study equation, reported for both  Americans   (GFRAA) and non- Americans (GFRNA), and normalized to 1.73m2   body surface area. The physician must decide which value applies to   the patient. The MDRD study equation should only be used in   individuals age 25 or older. It has not been validated for the   following: pregnant women, patients with serious comorbid conditions,   or on certain medications, or persons with extremes of body size,   muscle mass, or nutritional status.  Calcium 07/28/2017 8.8  8.5 - 10.1 MG/DL Final    Bilirubin, total 07/28/2017 0.4  0.2 - 1.0 MG/DL Final    ALT (SGPT) 07/28/2017 17  16 - 61 U/L Final    AST (SGOT) 07/28/2017 12* 15 - 37 U/L Final    Alk.  phosphatase 07/28/2017 59  45 - 117 U/L Final    Protein, total 07/28/2017 6.3* 6.4 - 8.2 g/dL Final    Albumin 07/28/2017 3.6  3.4 - 5.0 g/dL Final    Globulin 07/28/2017 2.7  2.0 - 4.0 g/dL Final    A-G Ratio 07/28/2017 1.3  0.8 - 1.7   Final    TSH 07/28/2017 0.82  0.36 - 3.74 uIU/mL Final   Hospital Outpatient Visit on 07/18/2017   Component Date Value Ref Range Status    Special Requests: 07/18/2017 LEFT CHEEK WOUND   Final    GRAM STAIN 07/18/2017 MODERATE WBC'S    Final    GRAM STAIN 07/18/2017 FEW GRAM POSITIVE COCCI IN PAIRS    Final    Culture result: 07/18/2017 MODERATE STAPHYLOCOCCUS AUREUS*   Final    Culture result: 07/18/2017 MODERATE STAPHYLOCOCCUS SPECIES, COAGULASE NEGATIVE (TWO MORPHOTYPES)*   Final       .  Results for orders placed or performed during the hospital encounter of 12/07/07   METABOLIC PANEL, COMPREHENSIVE   Result Value Ref Range    Sodium 144 136 - 145 mmol/L    Potassium 4.4 3.5 - 5.5 mmol/L    Chloride 106 100 - 108 mmol/L    CO2 28 21 - 32 mmol/L    Anion gap 10 3.0 - 18 mmol/L    Glucose 113 (H) 74 - 99 mg/dL    BUN 16 7.0 - 18 MG/DL    Creatinine 0.72 0.6 - 1.3 MG/DL    BUN/Creatinine ratio 22 (H) 12 - 20      GFR est AA >60 >60 ml/min/1.73m2    GFR est non-AA >60 >60 ml/min/1.73m2    Calcium 9.0 8.5 - 10.1 MG/DL    Bilirubin, total 0.4 0.2 - 1.0 MG/DL    ALT (SGPT) 16 16 - 61 U/L    AST (SGOT) 12 (L) 15 - 37 U/L    Alk. phosphatase 65 45 - 117 U/L    Protein, total 6.2 (L) 6.4 - 8.2 g/dL    Albumin 3.5 3.4 - 5.0 g/dL    Globulin 2.7 2.0 - 4.0 g/dL    A-G Ratio 1.3 0.8 - 1.7     CBC WITH AUTOMATED DIFF   Result Value Ref Range    WBC 12.1 4.6 - 13.2 K/uL    RBC 4.06 (L) 4.70 - 5.50 M/uL    HGB 12.2 (L) 13.0 - 16.0 g/dL    HCT 38.0 36.0 - 48.0 %    MCV 93.6 74.0 - 97.0 FL    MCH 30.0 24.0 - 34.0 PG    MCHC 32.1 31.0 - 37.0 g/dL    RDW 13.2 11.6 - 14.5 %    PLATELET 280 112 - 417 K/uL    MPV 11.2 9.2 - 11.8 FL    NEUTROPHILS 77 (H) 40 - 73 %    LYMPHOCYTES 14 (L) 21 - 52 %    MONOCYTES 6 3 - 10 %    EOSINOPHILS 3 0 - 5 %    BASOPHILS 0 0 - 2 %    ABS. NEUTROPHILS 9.3 (H) 1.8 - 8.0 K/UL    ABS. LYMPHOCYTES 1.7 0.9 - 3.6 K/UL    ABS. MONOCYTES 0.8 0.05 - 1.2 K/UL    ABS. EOSINOPHILS 0.4 0.0 - 0.4 K/UL    ABS. BASOPHILS 0.0 0.0 - 0.06 K/UL    DF AUTOMATED         Assessment / Plan      ICD-10-CM ICD-9-CM    1.  Acute encephalopathy E92.43 011.65 METABOLIC PANEL, COMPREHENSIVE      CBC WITH AUTOMATED DIFF 2. Chronic cholecystitis D06.3 132.69 METABOLIC PANEL, COMPREHENSIVE      CBC WITH AUTOMATED DIFF     Labs  Discontinue Aricept  Discontinue Remeron  he was advised to continue his maintenance medications    Follow-up Disposition:  Return in about 2 weeks (around 10/5/2017). I asked Suze Perrin. if he has any questions and I answered the questions. Suze Avalos Rule. states that he understands the treatment plan and agrees with the treatment plan

## 2017-09-25 NOTE — PROGRESS NOTES
Transitions of Care Coordination    Marion Parham was admitted to [facility] 8/11-8/14/17 for  sepsis/community acquired pneumonia and transferred to [place] [address]  The patient was readmitted to [facility] 8/16-8/21/17 for acute cholecystitis and underwent a cholecystostomy.  The patient was transferred back to [address] [place] [address] 8/21-9/9/17. The patient was discharged to home with [facility] on 9/10/17. Mr. Dyan Cuenca attended an appointments with Dr. Mikel Moore on 9/15 and 9/21/17. During the most recent appointment the patient appeared confused, lethargic and spoke very little. Patient appeared to have abdominal pain and still has an external drain to his gallbladder. Aricept and Remeron were discontinued. Labs were drawn. The patient was instructed to return to care in about 2 weeks. Will follow.

## 2017-09-27 NOTE — PROGRESS NOTES
The patient is being evaluated by me today at home for the chief complaint of lower extremity swelling    HPI    The patient complains of 3 days of worsening lower extremity edema. There is mild dyspnea - slightly worse. The patient was quite confused yesterday and a bit agitated. He is some better today but still confused. The patient has increasing pain in his right upper quadrant. The external drainage is still ok    Review of Systems   Respiratory: Positive for shortness of breath (Mild). Cardiovascular: Positive for chest pain and leg swelling. Gastrointestinal: Positive for abdominal pain. No Known Allergies    Current Outpatient Prescriptions   Medication Sig Dispense Refill    metoprolol tartrate (LOPRESSOR) 50 mg tablet TAKE 1 TABLET BY MOUTH 2 TIMES A DAY 60 Tab 11    donepezil (ARICEPT) 10 mg tablet Take 1 Tab by mouth nightly. 30 Tab 0    haloperidol (HALDOL) 0.5 mg tablet 1 tablet at bedtime 30 Tab 2    potassium chloride (KLOR-CON) 10 mEq tablet TAKE 1 TABLET BY MOUTH 2 TIMES A DAY 60 Tab 1    indapamide (LOZOL) 1.25 mg tablet Take  by mouth daily.  [START ON 10/14/2017] oxyCODONE ER (OXYCONTIN) 20 mg ER tablet Take 1 Tab by mouth every twelve (12) hours. Max Daily Amount: 40 mg. Indications: Chronic Pain 60 Tab 0    oxyCODONE-acetaminophen (PERCOCET)  mg per tablet Take 1 Tab by mouth every eight (8) hours as needed for Pain ((breakthrough pain)). Max Daily Amount: 3 Tabs. Indications: Pain 90 Tab 0    simvastatin (ZOCOR) 40 mg tablet TAKE 1 TABLET BY MOUTH ONCE DAILY 30 Tab 6    allopurinol (ZYLOPRIM) 300 mg tablet TAKE 1 TABLET BY MOUTH ONCE DAILY 90 Tab 3    aspirin delayed-release 81 mg tablet Take 81 mg by mouth two (2) times a day.  MULTIVITAMINS W-MINERALS/LUT (CENTRUM SILVER PO) Take 1 Tab by mouth daily. Past Medical History:   Diagnosis Date    3-vessel coronary artery disease 02/13/2004    Tech limited.   Low normal to mildly depressed LV systolic function. LVH. DDfx. LAE. PASP 25-30. 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 mLAD 100%. CX diffuse nonobstructive. OM1 patent stent. pRCA 100%. Dbl seq SVG to D1, OM1 100%. SVG to pRPDA 99% (3.5 x 23-mm Xience stent). Patent mid segment stent. LIMA to LAD ok.  Cardiac echocardiogram 11/16/2014    [facility]:  EF 55-60%. DDfx. Normal RVSP. Mild TR. Neg bubble study for atrial shunt. No significant valvular pathology.  Cardiac nuclear imaging test, high risk 06/15/2012    Lg area of mod mid to distal anterior ischemia. Lg area of inferior ischemia. EF 57%. Mild mid-distal anterior, inferior hypk.   High risk pharm stress test.    Cervical spine pain     Deviated septum     s/p corrective surgery 7/1/2010    Disc disease, degenerative, cervical     joint pain, back pain    Dysphagia, unspecified     Essential hypertension, benign     Gout with other specified manifestations     Head injury     frequent headaches    Hypercholesterolemia     Insomnia, unspecified     Lumbar canal stenosis     Meniere's disease     Myocardial infarction, anterior wall (HCC)     DIEGO (obstructive sleep apnea)     on CPAP    S/P diskectomy 7/5/07    cervical    Spinal stenosis, lumbar region, without neurogenic claudication     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     Unspecified sinusitis (chronic)     Ventral hernia, unspecified, without mention of obstruction or gangrene     S/p repair 6/2003       Past Surgical History:   Procedure Laterality Date    CARDIAC CATHETERIZATION  3/15/2017         CARDIAC SURG PROCEDURE UNLIST  1/04    angioplasty of a proximal left anterior descending    CARDIAC SURG PROCEDURE UNLIST  6/2/08    angioplasty and stenting of totally obstructed saphenous vein graft to the posterior descending artery    CORONARY ARTERY ANGIOGRAM  3/15/2017         HX APPENDECTOMY      HX CATARACT REMOVAL      bilateral    HX CERVICAL DISKECTOMY  7/07    and fusion    HX CORONARY ARTERY BYPASS GRAFT  4/14/94    X4, with LIMA to the LAD, double sequential to the diagonal and circumflex and single to the main right coronary artery    HX CORONARY STENT PLACEMENT  6/20/2012    HX GI  1992    two feet of colon removed    HX HEART CATHETERIZATION  6/02/08    HX HEART CATHETERIZATION  6/20/2012    HX HEENT  7/1/10    deviated nasal septum surgery    HX HERNIA REPAIR  7/2003    two prior hernia repairs in the past    HX OTHER SURGICAL      HX PTCA  6/20/2012    HX TONSILLECTOMY      as a child    DC [address] [facility]      for crushed disk       Social History     Social History    Marital status:      Spouse name: N/A    Number of children: N/A    Years of education: N/A     Occupational History    Not on file. Social History Main Topics    Smoking status: Never Smoker    Smokeless tobacco: Never Used    Alcohol use Yes    Drug use: No    Sexual activity: No     Other Topics Concern    Not on file     Social History Narrative       Patient does have an advanced directive on file    Physical Exam   Neck: Carotid bruit is not present. Cardiovascular: Normal rate and regular rhythm. Exam reveals no gallop. No murmur heard. Pulmonary/Chest: He has no wheezes. He has no rales. Abdominal: Soft. He exhibits no distension. There is tenderness (mild right upper quadrant abdominal tenderness). Musculoskeletal: He exhibits edema (2+ woody edema).        Hospital Outpatient Visit on 09/21/2017   Component Date Value Ref Range Status    Sodium 09/21/2017 144  136 - 145 mmol/L Final    Potassium 09/21/2017 4.4  3.5 - 5.5 mmol/L Final    Chloride 09/21/2017 106  100 - 108 mmol/L Final    CO2 09/21/2017 28  21 - 32 mmol/L Final    Anion gap 09/21/2017 10  3.0 - 18 mmol/L Final    Glucose 09/21/2017 113* 74 - 99 mg/dL Final    BUN 09/21/2017 16  7.0 - 18 MG/DL Final    Creatinine 09/21/2017 0.72  0.6 - 1.3 MG/DL Final    BUN/Creatinine ratio 09/21/2017 22* 12 - 20   Final    GFR est AA 09/21/2017 >60  >60 ml/min/1.73m2 Final    GFR est non-AA 09/21/2017 >60  >60 ml/min/1.73m2 Final    Comment: (NOTE)  Estimated GFR is calculated using the Modification of Diet in Renal   Disease (MDRD) Study equation, reported for both  Americans   (GFRAA) and non- Americans (GFRNA), and normalized to 1.73m2   body surface area. The physician must decide which value applies to   the patient. The MDRD study equation should only be used in   individuals age 25 or older. It has not been validated for the   following: pregnant women, patients with serious comorbid conditions,   or on certain medications, or persons with extremes of body size,   muscle mass, or nutritional status.  Calcium 09/21/2017 9.0  8.5 - 10.1 MG/DL Final    Bilirubin, total 09/21/2017 0.4  0.2 - 1.0 MG/DL Final    ALT (SGPT) 09/21/2017 16  16 - 61 U/L Final    AST (SGOT) 09/21/2017 12* 15 - 37 U/L Final    Alk.  phosphatase 09/21/2017 65  45 - 117 U/L Final    Protein, total 09/21/2017 6.2* 6.4 - 8.2 g/dL Final    Albumin 09/21/2017 3.5  3.4 - 5.0 g/dL Final    Globulin 09/21/2017 2.7  2.0 - 4.0 g/dL Final    A-G Ratio 09/21/2017 1.3  0.8 - 1.7   Final    WBC 09/21/2017 12.1  4.6 - 13.2 K/uL Final    RBC 09/21/2017 4.06* 4.70 - 5.50 M/uL Final    HGB 09/21/2017 12.2* 13.0 - 16.0 g/dL Final    HCT 09/21/2017 38.0  36.0 - 48.0 % Final    MCV 09/21/2017 93.6  74.0 - 97.0 FL Final    MCH 09/21/2017 30.0  24.0 - 34.0 PG Final    MCHC 09/21/2017 32.1  31.0 - 37.0 g/dL Final    RDW 09/21/2017 13.2  11.6 - 14.5 % Final    PLATELET 34/29/1473 745  135 - 420 K/uL Final    MPV 09/21/2017 11.2  9.2 - 11.8 FL Final    NEUTROPHILS 09/21/2017 77* 40 - 73 % Final  LYMPHOCYTES 09/21/2017 14* 21 - 52 % Final    MONOCYTES 09/21/2017 6  3 - 10 % Final    EOSINOPHILS 09/21/2017 3  0 - 5 % Final    BASOPHILS 09/21/2017 0  0 - 2 % Final    ABS. NEUTROPHILS 09/21/2017 9.3* 1.8 - 8.0 K/UL Final    ABS. LYMPHOCYTES 09/21/2017 1.7  0.9 - 3.6 K/UL Final    ABS. MONOCYTES 09/21/2017 0.8  0.05 - 1.2 K/UL Final    ABS. EOSINOPHILS 09/21/2017 0.4  0.0 - 0.4 K/UL Final    ABS. BASOPHILS 09/21/2017 0.0  0.0 - 0.06 K/UL Final    DF 09/21/2017 AUTOMATED    Final   Admission on 08/16/2017, Discharged on 08/21/2017   No results displayed because visit has over 200 results. Admission on 08/11/2017, Discharged on 08/14/2017   No results displayed because visit has over 200 results. Hospital Outpatient Visit on 07/28/2017   Component Date Value Ref Range Status    WBC 07/28/2017 7.0  4.6 - 13.2 K/uL Final    RBC 07/28/2017 4.37* 4.70 - 5.50 M/uL Final    HGB 07/28/2017 13.5  13.0 - 16.0 g/dL Final    HCT 07/28/2017 41.3  36.0 - 48.0 % Final    MCV 07/28/2017 94.5  74.0 - 97.0 FL Final    MCH 07/28/2017 30.9  24.0 - 34.0 PG Final    MCHC 07/28/2017 32.7  31.0 - 37.0 g/dL Final    RDW 07/28/2017 13.6  11.6 - 14.5 % Final    PLATELET 68/22/2019 654  135 - 420 K/uL Final    MPV 07/28/2017 11.1  9.2 - 11.8 FL Final    NEUTROPHILS 07/28/2017 51  40 - 73 % Final    LYMPHOCYTES 07/28/2017 35  21 - 52 % Final    MONOCYTES 07/28/2017 8  3 - 10 % Final    EOSINOPHILS 07/28/2017 6* 0 - 5 % Final    BASOPHILS 07/28/2017 0  0 - 2 % Final    ABS. NEUTROPHILS 07/28/2017 3.5  1.8 - 8.0 K/UL Final    ABS. LYMPHOCYTES 07/28/2017 2.4  0.9 - 3.6 K/UL Final    ABS. MONOCYTES 07/28/2017 0.6  0.05 - 1.2 K/UL Final    ABS. EOSINOPHILS 07/28/2017 0.4  0.0 - 0.4 K/UL Final    ABS.  BASOPHILS 07/28/2017 0.0  0.0 - 0.06 K/UL Final    DF 07/28/2017 AUTOMATED    Final    Sodium 07/28/2017 143  136 - 145 mmol/L Final    Potassium 07/28/2017 4.3  3.5 - 5.5 mmol/L Final    Chloride 07/28/2017 103  100 - 108 mmol/L Final    CO2 07/28/2017 33* 21 - 32 mmol/L Final    Anion gap 07/28/2017 7  3.0 - 18 mmol/L Final    Glucose 07/28/2017 123* 74 - 99 mg/dL Final    BUN 07/28/2017 11  7.0 - 18 MG/DL Final    Creatinine 07/28/2017 0.78  0.6 - 1.3 MG/DL Final    BUN/Creatinine ratio 07/28/2017 14  12 - 20   Final    GFR est AA 07/28/2017 >60  >60 ml/min/1.73m2 Final    GFR est non-AA 07/28/2017 >60  >60 ml/min/1.73m2 Final    Comment: (NOTE)  Estimated GFR is calculated using the Modification of Diet in Renal   Disease (MDRD) Study equation, reported for both  Americans   (GFRAA) and non- Americans (GFRNA), and normalized to 1.73m2   body surface area. The physician must decide which value applies to   the patient. The MDRD study equation should only be used in   individuals age 25 or older. It has not been validated for the   following: pregnant women, patients with serious comorbid conditions,   or on certain medications, or persons with extremes of body size,   muscle mass, or nutritional status.  Calcium 07/28/2017 8.8  8.5 - 10.1 MG/DL Final    Bilirubin, total 07/28/2017 0.4  0.2 - 1.0 MG/DL Final    ALT (SGPT) 07/28/2017 17  16 - 61 U/L Final    AST (SGOT) 07/28/2017 12* 15 - 37 U/L Final    Alk.  phosphatase 07/28/2017 59  45 - 117 U/L Final    Protein, total 07/28/2017 6.3* 6.4 - 8.2 g/dL Final    Albumin 07/28/2017 3.6  3.4 - 5.0 g/dL Final    Globulin 07/28/2017 2.7  2.0 - 4.0 g/dL Final    A-G Ratio 07/28/2017 1.3  0.8 - 1.7   Final    TSH 07/28/2017 0.82  0.36 - 3.74 uIU/mL Final   Hospital Outpatient Visit on 07/18/2017   Component Date Value Ref Range Status    Special Requests: 07/18/2017 LEFT CHEEK WOUND   Final    GRAM STAIN 07/18/2017 MODERATE WBC'S    Final    GRAM STAIN 07/18/2017 FEW GRAM POSITIVE COCCI IN PAIRS    Final    Culture result: 07/18/2017 MODERATE STAPHYLOCOCCUS AUREUS*   Final    Culture result: 07/18/2017 MODERATE STAPHYLOCOCCUS SPECIES, COAGULASE NEGATIVE (TWO MORPHOTYPES)*   Final       .No results found for any visits on 09/27/17. Assessment / Plan:      ICD-10-CM ICD-9-CM    1. Bilateral edema of lower extremity R60.0 782.3    2. DISH (diffuse idiopathic skeletal hyperostosis) M48.10 721.6    3. Gallstones K80.20 574.20    4.  Acute encephalopathy G93.40 348.30        Add Lasix  Restart Aricept  he was advised to continue his maintenance medications  Labs in 3 weeks   I will talk to general thomas    Follow up:  1 month    LOS:  [id number]

## 2017-10-04 NOTE — TELEPHONE ENCOUNTER
Order to Extend Physical Therapy  At home 2 times a week for four weeks.  Please call Latasha Copeland  [phone]

## 2017-10-06 NOTE — LETTER
10/06/17 RE:  Hannah Pressley. :  1935 Please continue physical therapy for Suze Mitchell. Diagnosis: ICD-10-CM ICD-9-CM 1. DISH (diffuse idiopathic skeletal hyperostosis) M48.10 721.6 2. Gallstones K80.20 574.20   
3. Chronic cholecystitis K81.1 575.11 Thank you. Sincerely, [facility] VIVEK ShermanP

## 2017-10-06 NOTE — MR AVS SNAPSHOT
Visit Information Date & Time Provider Department Dept. Phone Encounter #  
 10/6/2017 11:15 AM Janice Guallpa MD [facility] INTERNAL MEDICINE OF [address] [place] [address] Drive [phone] [id number] Your Appointments 11/6/2017  3:20 PM  
Follow Up with Siva Lim DO [facility] ([facility]) Appt Note: 6 mth f/up Michel Monahan [id number] [phone] Robert Garcia  
  
    
 11/17/2017  1:15 PM  
Follow Up with Janice Guallpa MD  
[facility]) Appt Note: 1 mo f/u  
 [address] [facility], [address] [place] [address]  
[phone]  
  
   
 [address] [place] [address], [address] [facility] [id number]  
  
    
 11/29/2017  2:15 PM  
Follow Up with Bea Mora MD  
[facility]) Appt Note: 3 MONTH FU / NO COPAY PT AWARE  
 [address] [facility] [address]  
[phone]  
  
   
 [address] [facility] [facility] [address] Upcoming Health Maintenance Date Due DTaP/Tdap/Td series (1 - Tdap) 12/13/1956 INFLUENZA AGE 9 TO ADULT 8/1/2017 Pneumococcal 65+ Low/Medium Risk (2 of 2 - PCV13) 9/7/2017 MEDICARE YEARLY EXAM 6/22/2018 GLAUCOMA SCREENING Q2Y 6/20/2019 Allergies as of 10/6/2017  Review Complete On: 10/6/2017 By: Conrad Myers LPN No Known Allergies Current Immunizations  Reviewed on 7/24/2017 Name Date Influenza Vaccine (Quad) PF 9/7/2016, 9/14/2015 Pneumococcal Polysaccharide (PPSV-23) 9/7/2016 Zoster Vaccine, Live 11/28/2016 Not reviewed this visit You Were Diagnosed With   
  
 Codes Comments DISH (diffuse idiopathic skeletal hyperostosis)    -  Primary ICD-10-CM: M48.10 ICD-9-CM: 721.6 Gallstones     ICD-10-CM: K80.20 ICD-9-CM: 574.20 Chronic cholecystitis     ICD-10-CM: K81.1 ICD-9-CM: 575.11 Vitals BP Pulse Temp Resp Height(growth percentile) 128/68 (BP 1 Location: Left arm, BP Patient Position: Sitting) (!) 55 97.7 °F (36.5 °C) (Tympanic) 20 5' 11\" (1.803 m) Weight(growth percentile) SpO2 BMI Smoking Status 196 lb (88.9 kg) 99% 27.34 kg/m2 Never Smoker BMI and BSA Data Body Mass Index Body Surface Area  
 27.34 kg/m 2 2.11 m 2 Preferred Pharmacy Pharmacy Name Phone [address] [place] [address], [address] [place] [address] [phone] Your Updated Medication List  
  
   
This list is accurate as of: 10/6/17 12:40 PM.  Always use your most recent med list.  
  
  
  
  
 allopurinol 300 mg tablet Commonly known as:  ZYLOPRIM  
TAKE 1 TABLET BY MOUTH ONCE DAILY  
  
 aspirin delayed-release 81 mg tablet Take 81 mg by mouth two (2) times a day. CENTRUM SILVER PO Take 1 Tab by mouth daily. donepezil 10 mg tablet Commonly known as:  ARICEPT Take 1 Tab by mouth nightly. furosemide 20 mg tablet Commonly known as:  LASIX Take  by mouth daily. haloperidol 0.5 mg tablet Commonly known as:  HALDOL  
1 tablet at bedtime  
  
 metoprolol tartrate 50 mg tablet Commonly known as:  LOPRESSOR  
TAKE 1 TABLET BY MOUTH 2 TIMES A DAY  
  
 NITROSTAT 0.4 mg SL tablet Generic drug:  nitroglycerin  
by SubLINGual route every five (5) minutes as needed for Chest Pain. oxyCODONE ER 20 mg ER tablet Commonly known as:  OxyCONTIN Take 1 Tab by mouth every twelve (12) hours. Max Daily Amount: 40 mg. Indications: Chronic Pain Start taking on:  10/14/2017  
  
 oxyCODONE-acetaminophen  mg per tablet Commonly known as:  PERCOCET Take 1 Tab by mouth every eight (8) hours as needed for Pain ((breakthrough pain)). Max Daily Amount: 3 Tabs. Indications: Pain  
  
 potassium chloride 10 mEq tablet Commonly known as:  KLOR-CON  
TAKE 1 TABLET BY MOUTH 2 TIMES A DAY  
  
 ursodiol 300 mg capsule Commonly known as:  ACTIGALL  
1 capsule twice per day Prescriptions Sent to Pharmacy Refills  
 ursodiol (ACTIGALL) 300 mg capsule 3 Si capsule twice per day Class: Normal  
 Pharmacy: [id number] [facility], [address] [facility] #: [phone] To-Do List   
 10/20/2017 11:00 AM  
  Appointment with [address] [facility] XR 3 at [facility] ([phone]) GENERAL INSTRUCTIONS - If you have a hand-written prescription for this exam, you must bring it with you on the day of your exam. - Bring all relevant prior images from facilities outside of [facility]. Failure to provide images may delay reading by Radiologist. - Only patients will be allowed in the exam room. This includes children. - Children under the age of 15 may not be left unattended. - [address] [facility] patients must have an armband and be accompanied by a caregiver or family member. - Krzysztof Gilliam may be responsible for any co-payments and deductibles as indicated by your insurance carrier. APPOINTMENT CANCELLATION - To reschedule or cancel an appointment, please contact the Scheduling department at [phone]  
  
 12/15/2017 11:00 AM  
  Appointment with [facility] CATH LAB HOLDING; [facility] NO ANESTHESIA; [facility] IR VAS SURGEON; [address] at [facility] RAD [address] ([phone]) DIET RESTRICTIONS - Do not eat or drink for 6 hours prior to procedure. - You may take meds with sips of water. For meds to be taken with food, take the night before your procedure. GENERAL INSTRUCTIONS - If you are on a blood thinner, you must contact your doctor to determine if you may discontinue your blood thinner prior to your exam.  Failure to contact your doctor may result in a reschedule of your appointment. - Leave all valuables at home.  - Bring an updated list of your medications including over the Counter and herbal therapies. - If you  have a hand-written prescription for this exam, you must bring it with you to your appointment. - You may be responsible for any co-payments and deductibles as indicated by your insurance carrier. - Only patients will be allowed in the exam room, including children. - Children under the age of 15 may not be left unattended. - Bring all relevant prior images from facilities outside of [facility]. Failure to provide images may delay reading by Radiologist. - [address] [place] [address] patients must have an armband and be accompanied by a caregiver or family member. APPOINTMENT CANCELLATION - To reschedule or cancel an appointment, please contact the Scheduling department at [phone]. Patient Instructions Health Maintenance Due Topic Date Due  
 DTaP/Tdap/Td  (1 - Tdap) 12/13/1956  Flu Vaccine  08/01/2017  Pneumococcal Vaccine (2 of 2 - PCV13) 09/07/2017 Introducing [facility]! Amanda Strange introduces [employer] patient portal. Now you can access parts of your medical record, email your doctor's office, and request medication refills online. 1. In your internet browser, go to [email or web address] 2. Click on the First Time User? Click Here link in the Sign In box. You will see the New Member Sign Up page. 3. Enter your [employer] Access Code exactly as it appears below. You will not need to use this code after youve completed the sign-up process. If you do not sign up before the expiration date, you must request a new code. · [employer] Access Code: [email or web address] Expires: 11/12/2017  5:34 PM 
 
4. Enter the last four digits of your Social Security Number (xxxx) and Date of Birth (mm/dd/yyyy) as indicated and click Submit. You will be taken to the next sign-up page. 5. Create a [employer] ID.  This will be your [employer] login ID and cannot be changed, so think of one that is secure and easy to remember. 6. Create a [employer] password. You can change your password at any time. 7. Enter your Password Reset Question and Answer. This can be used at a later time if you forget your password. 8. Enter your e-mail address. You will receive e-mail notification when new information is available in [address] 9. Click Sign Up. You can now view and download portions of your medical record. 10. Click the Download Summary menu link to download a portable copy of your medical information. If you have questions, please visit the Frequently Asked Questions section of the [employer] website. Remember, [employer] is NOT to be used for urgent needs. For medical emergencies, dial 911. Now available from your iPhone and Android! Please provide this summary of care documentation to your next provider. Your primary care clinician is listed as Eder Guerin. If you have any questions after today's visit, please call [phone].

## 2017-10-06 NOTE — PROGRESS NOTES
1. Have you been to the ER, urgent care clinic since your last visit? Hospitalized since your last visit? No    2. Have you seen or consulted any other health care providers outside of the [address] [place] [address] since your last visit? Include any pap smears or colon screening.  No

## 2017-10-07 NOTE — PROGRESS NOTES
The patient presents to the office today with the chief complaint of LE Edema    HPI    The patient remains on Lasix for bilateral LE edema. The swelling is doing better on the medication. He is tolerating the medication well. The patient has encephalopathy - present since his last hospitaliztion. The patient remains confused but overall his is less agitated. The patient has gallstones with chronic cholecyctitis. He has an external drain in place. His has mild pain in his right upper quadrant. He has a poor appetitie. Review of Systems   Respiratory: Negative for shortness of breath. Cardiovascular: Negative for chest pain and leg swelling. Gastrointestinal: Positive for abdominal pain. No Known Allergies    Current Outpatient Prescriptions   Medication Sig Dispense Refill    nitroglycerin (NITROSTAT) 0.4 mg SL tablet by SubLINGual route every five (5) minutes as needed for Chest Pain.  furosemide (LASIX) 20 mg tablet Take  by mouth daily.  ursodiol (ACTIGALL) 300 mg capsule 1 capsule twice per day 60 Cap 3    metoprolol tartrate (LOPRESSOR) 50 mg tablet TAKE 1 TABLET BY MOUTH 2 TIMES A DAY 60 Tab 11    donepezil (ARICEPT) 10 mg tablet Take 1 Tab by mouth nightly. 30 Tab 0    haloperidol (HALDOL) 0.5 mg tablet 1 tablet at bedtime 30 Tab 2    potassium chloride (KLOR-CON) 10 mEq tablet TAKE 1 TABLET BY MOUTH 2 TIMES A DAY 60 Tab 1    [START ON 10/14/2017] oxyCODONE ER (OXYCONTIN) 20 mg ER tablet Take 1 Tab by mouth every twelve (12) hours. Max Daily Amount: 40 mg. Indications: Chronic Pain 60 Tab 0    oxyCODONE-acetaminophen (PERCOCET)  mg per tablet Take 1 Tab by mouth every eight (8) hours as needed for Pain ((breakthrough pain)). Max Daily Amount: 3 Tabs. Indications: Pain 90 Tab 0    allopurinol (ZYLOPRIM) 300 mg tablet TAKE 1 TABLET BY MOUTH ONCE DAILY 90 Tab 3    aspirin delayed-release 81 mg tablet Take 81 mg by mouth two (2) times a day.       MULTIVITAMINS W-MINERALS/LUT (CENTRUM SILVER PO) Take 1 Tab by mouth daily. Past Medical History:   Diagnosis Date    3-vessel coronary artery disease 02/13/2004    Tech limited. Low normal to mildly depressed LV systolic function. LVH. DDfx. LAE. PASP 25-30. 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 mLAD 100%. CX diffuse nonobstructive. OM1 patent stent. pRCA 100%. Dbl seq SVG to D1, OM1 100%. SVG to pRPDA 99% (3.5 x 23-mm Xience stent). Patent mid segment stent. LIMA to LAD ok.  Cardiac echocardiogram 11/16/2014    [facility]:  EF 55-60%. DDfx. Normal RVSP. Mild TR. Neg bubble study for atrial shunt. No significant valvular pathology.  Cardiac nuclear imaging test, high risk 06/15/2012    Lg area of mod mid to distal anterior ischemia. Lg area of inferior ischemia. EF 57%. Mild mid-distal anterior, inferior hypk.   High risk pharm stress test.    Cervical spine pain     Deviated septum     s/p corrective surgery 7/1/2010    Disc disease, degenerative, cervical     joint pain, back pain    Dysphagia, unspecified(787.20)     Essential hypertension, benign     Gout with other specified manifestations(274.89)     Head injury     frequent headaches    Hypercholesterolemia     Insomnia, unspecified     Lumbar canal stenosis     Meniere's disease     Myocardial infarction, anterior wall (HCC)     DIEGO (obstructive sleep apnea)     on CPAP    S/P diskectomy 7/5/07    cervical    Spinal stenosis, lumbar region, without neurogenic claudication     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     Unspecified sinusitis (chronic)     Ventral hernia, unspecified, without mention of obstruction or gangrene     S/p repair 6/2003       Past Surgical History:   Procedure Laterality Date    CARDIAC CATHETERIZATION  3/15/2017         CARDIAC SURG PROCEDURE UNLIST  1/04    angioplasty of a proximal left anterior descending    CARDIAC SURG PROCEDURE UNLIST  6/2/08    angioplasty and stenting of totally obstructed saphenous vein graft to the posterior descending artery    CORONARY ARTERY ANGIOGRAM  3/15/2017         HX APPENDECTOMY      HX CATARACT REMOVAL      bilateral    HX CERVICAL DISKECTOMY  7/07    and fusion    HX CORONARY ARTERY BYPASS GRAFT  4/14/94    X4, with LIMA to the LAD, double sequential to the diagonal and circumflex and single to the main right coronary artery    HX CORONARY STENT PLACEMENT  6/20/2012    HX GI  1992    two feet of colon removed    HX HEART CATHETERIZATION  6/02/08    HX HEART CATHETERIZATION  6/20/2012    HX HEENT  7/1/10    deviated nasal septum surgery    HX HERNIA REPAIR  7/2003    two prior hernia repairs in the past    HX OTHER SURGICAL      HX PTCA  6/20/2012    HX TONSILLECTOMY      as a child    [place] [address] [facility]       Social History     Social History    Marital status:      Spouse name: N/A    Number of children: N/A    Years of education: N/A     Occupational History    Not on file. Social History Main Topics    Smoking status: Never Smoker    Smokeless tobacco: Never Used    Alcohol use Yes    Drug use: No    Sexual activity: No     Other Topics Concern    Not on file     Social History Narrative       Patient does not have an advanced directive on file    Visit Vitals    /68 (BP 1 Location: Left arm, BP Patient Position: Sitting)    Pulse (!) 55    Temp 97.7 °F (36.5 °C) (Tympanic)    Resp 20    Ht 5' 11\" (1.803 m)    Wt 196 lb (88.9 kg)    SpO2 99%    BMI 27.34 kg/m2       Physical Exam   Neck: Carotid bruit is not present. Cardiovascular: Normal rate and regular rhythm. Exam reveals no gallop. No murmur heard.   Pulmonary/Chest: He has no wheezes. He has no rales. Abdominal: Soft. He exhibits no distension. There is tenderness (Mild upper abdominal tenderness). There is no rebound and no guarding. Musculoskeletal: He exhibits no edema. Neurological:   Quiet. Speaks slowly with difficulty finding words. Calm       BMI:  [facility] Outpatient Visit on 09/21/2017   Component Date Value Ref Range Status    Sodium 09/21/2017 144  136 - 145 mmol/L Final    Potassium 09/21/2017 4.4  3.5 - 5.5 mmol/L Final    Chloride 09/21/2017 106  100 - 108 mmol/L Final    CO2 09/21/2017 28  21 - 32 mmol/L Final    Anion gap 09/21/2017 10  3.0 - 18 mmol/L Final    Glucose 09/21/2017 113* 74 - 99 mg/dL Final    BUN 09/21/2017 16  7.0 - 18 MG/DL Final    Creatinine 09/21/2017 0.72  0.6 - 1.3 MG/DL Final    BUN/Creatinine ratio 09/21/2017 22* 12 - 20   Final    GFR est AA 09/21/2017 >60  >60 ml/min/1.73m2 Final    GFR est non-AA 09/21/2017 >60  >60 ml/min/1.73m2 Final    Comment: (NOTE)  Estimated GFR is calculated using the Modification of Diet in Renal   Disease (MDRD) Study equation, reported for both  Americans   (GFRAA) and non- Americans (GFRNA), and normalized to 1.73m2   body surface area. The physician must decide which value applies to   the patient. The MDRD study equation should only be used in   individuals age 25 or older. It has not been validated for the   following: pregnant women, patients with serious comorbid conditions,   or on certain medications, or persons with extremes of body size,   muscle mass, or nutritional status.  Calcium 09/21/2017 9.0  8.5 - 10.1 MG/DL Final    Bilirubin, total 09/21/2017 0.4  0.2 - 1.0 MG/DL Final    ALT (SGPT) 09/21/2017 16  16 - 61 U/L Final    AST (SGOT) 09/21/2017 12* 15 - 37 U/L Final    Alk.  phosphatase 09/21/2017 65  45 - 117 U/L Final    Protein, total 09/21/2017 6.2* 6.4 - 8.2 g/dL Final    Albumin 09/21/2017 3.5  3.4 - 5.0 g/dL Final    Globulin 09/21/2017 2.7  2.0 - 4.0 g/dL Final    A-G Ratio 09/21/2017 1.3  0.8 - 1.7   Final    WBC 09/21/2017 12.1  4.6 - 13.2 K/uL Final    RBC 09/21/2017 4.06* 4.70 - 5.50 M/uL Final    HGB 09/21/2017 12.2* 13.0 - 16.0 g/dL Final    HCT 09/21/2017 38.0  36.0 - 48.0 % Final    MCV 09/21/2017 93.6  74.0 - 97.0 FL Final    MCH 09/21/2017 30.0  24.0 - 34.0 PG Final    MCHC 09/21/2017 32.1  31.0 - 37.0 g/dL Final    RDW 09/21/2017 13.2  11.6 - 14.5 % Final    PLATELET 48/60/8923 576  135 - 420 K/uL Final    MPV 09/21/2017 11.2  9.2 - 11.8 FL Final    NEUTROPHILS 09/21/2017 77* 40 - 73 % Final    LYMPHOCYTES 09/21/2017 14* 21 - 52 % Final    MONOCYTES 09/21/2017 6  3 - 10 % Final    EOSINOPHILS 09/21/2017 3  0 - 5 % Final    BASOPHILS 09/21/2017 0  0 - 2 % Final    ABS. NEUTROPHILS 09/21/2017 9.3* 1.8 - 8.0 K/UL Final    ABS. LYMPHOCYTES 09/21/2017 1.7  0.9 - 3.6 K/UL Final    ABS. MONOCYTES 09/21/2017 0.8  0.05 - 1.2 K/UL Final    ABS. EOSINOPHILS 09/21/2017 0.4  0.0 - 0.4 K/UL Final    ABS. BASOPHILS 09/21/2017 0.0  0.0 - 0.06 K/UL Final    DF 09/21/2017 AUTOMATED    Final   Admission on 08/16/2017, Discharged on 08/21/2017   No results displayed because visit has over 200 results. Admission on 08/11/2017, Discharged on 08/14/2017   No results displayed because visit has over 200 results.       Hospital Outpatient Visit on 07/28/2017   Component Date Value Ref Range Status    WBC 07/28/2017 7.0  4.6 - 13.2 K/uL Final    RBC 07/28/2017 4.37* 4.70 - 5.50 M/uL Final    HGB 07/28/2017 13.5  13.0 - 16.0 g/dL Final    HCT 07/28/2017 41.3  36.0 - 48.0 % Final    MCV 07/28/2017 94.5  74.0 - 97.0 FL Final    MCH 07/28/2017 30.9  24.0 - 34.0 PG Final    MCHC 07/28/2017 32.7  31.0 - 37.0 g/dL Final    RDW 07/28/2017 13.6  11.6 - 14.5 % Final    PLATELET 14/61/0586 081  135 - 420 K/uL Final    MPV 07/28/2017 11.1  9.2 - 11.8 FL Final    NEUTROPHILS 07/28/2017 51  40 - 73 % Final    LYMPHOCYTES 07/28/2017 35  21 - 52 % Final    MONOCYTES 07/28/2017 8  3 - 10 % Final    EOSINOPHILS 07/28/2017 6* 0 - 5 % Final    BASOPHILS 07/28/2017 0  0 - 2 % Final    ABS. NEUTROPHILS 07/28/2017 3.5  1.8 - 8.0 K/UL Final    ABS. LYMPHOCYTES 07/28/2017 2.4  0.9 - 3.6 K/UL Final    ABS. MONOCYTES 07/28/2017 0.6  0.05 - 1.2 K/UL Final    ABS. EOSINOPHILS 07/28/2017 0.4  0.0 - 0.4 K/UL Final    ABS. BASOPHILS 07/28/2017 0.0  0.0 - 0.06 K/UL Final    DF 07/28/2017 AUTOMATED    Final    Sodium 07/28/2017 143  136 - 145 mmol/L Final    Potassium 07/28/2017 4.3  3.5 - 5.5 mmol/L Final    Chloride 07/28/2017 103  100 - 108 mmol/L Final    CO2 07/28/2017 33* 21 - 32 mmol/L Final    Anion gap 07/28/2017 7  3.0 - 18 mmol/L Final    Glucose 07/28/2017 123* 74 - 99 mg/dL Final    BUN 07/28/2017 11  7.0 - 18 MG/DL Final    Creatinine 07/28/2017 0.78  0.6 - 1.3 MG/DL Final    BUN/Creatinine ratio 07/28/2017 14  12 - 20   Final    GFR est AA 07/28/2017 >60  >60 ml/min/1.73m2 Final    GFR est non-AA 07/28/2017 >60  >60 ml/min/1.73m2 Final    Comment: (NOTE)  Estimated GFR is calculated using the Modification of Diet in Renal   Disease (MDRD) Study equation, reported for both  Americans   (GFRAA) and non- Americans (GFRNA), and normalized to 1.73m2   body surface area. The physician must decide which value applies to   the patient. The MDRD study equation should only be used in   individuals age 25 or older. It has not been validated for the   following: pregnant women, patients with serious comorbid conditions,   or on certain medications, or persons with extremes of body size,   muscle mass, or nutritional status.  Calcium 07/28/2017 8.8  8.5 - 10.1 MG/DL Final    Bilirubin, total 07/28/2017 0.4  0.2 - 1.0 MG/DL Final    ALT (SGPT) 07/28/2017 17  16 - 61 U/L Final    AST (SGOT) 07/28/2017 12* 15 - 37 U/L Final    Alk.  phosphatase 07/28/2017 59  45 - 117 U/L Final    Protein, total 07/28/2017 6.3* 6.4 - 8.2 g/dL Final    Albumin 07/28/2017 3.6  3.4 - 5.0 g/dL Final    Globulin 07/28/2017 2.7  2.0 - 4.0 g/dL Final    A-G Ratio 07/28/2017 1.3  0.8 - 1.7   Final    TSH 07/28/2017 0.82  0.36 - 3.74 uIU/mL Final   Hospital Outpatient Visit on 07/18/2017   Component Date Value Ref Range Status    Special Requests: 07/18/2017 LEFT CHEEK WOUND   Final    GRAM STAIN 07/18/2017 MODERATE WBC'S    Final    GRAM STAIN 07/18/2017 FEW GRAM POSITIVE COCCI IN PAIRS    Final    Culture result: 07/18/2017 MODERATE STAPHYLOCOCCUS AUREUS*   Final    Culture result: 07/18/2017 MODERATE STAPHYLOCOCCUS SPECIES, COAGULASE NEGATIVE (TWO MORPHOTYPES)*   Final       .No results found for any visits on 10/06/17. Assessment / Plan      ICD-10-CM ICD-9-CM    1. Bilateral leg edema R60.0 782.3    2. DISH (diffuse idiopathic skeletal hyperostosis) M48.10 721.6    3. Gallstones K80.20 574.20    4. Chronic cholecystitis K81.1 575.11    5. Encephalopathy G93.40 348.30        Add Actigall to see if pain decreases and if appetite increases  he was advised to continue his maintenance medications including Lasix  I asked the patient and his wife for any questions and I answered their questions. They state that they understand the treatment plan and agree with the treatment plan    Follow-up Disposition:  Return in about 6 weeks (around 11/17/2017). I asked Suze Kinney if he has any questions and I answered the questions. Suze Lombardi. states that he understands the treatment plan and agrees with the treatment plan

## 2017-10-09 NOTE — TELEPHONE ENCOUNTER
Called and left message on personal voice mail that it is ok to extend PT if hadn't already received ok. Added to call back with any additional questions or concerns.

## 2017-10-10 NOTE — PROGRESS NOTES
Transitions of Care Coordination    Kayleigh Briones was admitted to [facility] 8/11-8/14/17 for  sepsis/community acquired pneumonia and transferred to [place] [address]  The patient was readmitted to [facility] 8/16-8/21/17 for acute cholecystitis and underwent a cholecystostomy.  The patient was transferred back to [address] [place] [address] 8/21-9/9/17. The patient was discharged to home with [facility] on 9/10/17. Since discharge from last admission to [facility] for acute cholecystitis the patient has been seen by Dr. Papi Cuba multiple times either at home or in the office. The most recent office visit with Dr. Papi Cuba was on 10/6/17. Patient was seen for bilateral LE edema which per note is better now. Mr. Jose Gallo is still described as confused but less agitated. The external drain is still in place and wife is independent with care. Ursodiol was added to medications and simvastatin and indapamide were discontinued. The patient has orders for upcoming imaging  for the biliary drain. Mr. Jose Gallo lives at home with his wife who assists as needed. Goals met. Episode closed:  No ED visit or hospitalization 30 days from discharge. Goals Addressed             Most Recent       Post Hospitalization     Prevent complications post hospitalization. On track (10/10/2017)     Supportive resources in place to maintain patient in the community (ie.  Home Health, DME equipment, refer to, medication assistant plan, etc.)   On track (10/10/2017)

## 2017-11-06 NOTE — PROGRESS NOTES
1. Have you been to the ER, urgent care clinic since your last visit? Hospitalized since your last visit? yes, 8-16/8-21 Cholecystostomy tube placement, 8-11/8-14 community acquired pneumonia      2. Have you seen or consulted any other health care providers outside of the [address] [place] [address] since your last visit? Include any pap smears or colon screening.  no

## 2017-11-06 NOTE — PROGRESS NOTES
Review of Systems   Constitutional: Positive for diaphoresis, malaise/fatigue and weight loss. Negative for chills and fever. Respiratory: Negative. Cardiovascular: Positive for orthopnea. Negative for chest pain, palpitations, claudication and leg swelling. Gastrointestinal: Negative. Musculoskeletal: Negative. Neurological: Positive for weakness.

## 2017-11-06 NOTE — TELEPHONE ENCOUNTER
Ирина Mckeon with home health called  t o say his home and physical therapy was discharged on 11/03/17

## 2017-11-06 NOTE — PROGRESS NOTES
HPI:  I saw Frandy Garcia, in my office today in cardiovascular evaluation regarding his chronic underlying coronary artery disease prior to a planned repeat cardiac catheterization and stenting procedure due to an abnormal screening nuclear myocardial perfusion study. Mr. Costa Peters is a very pleasant 80year old white male with history of coronary artery disease, status post coronary artery bypass grafting surgery x four in April of 1994, with several angioplasty and stenting procedures since that time.      Due to a very abnormal and high risk screening nuclear myocardial perfusion study completed on February 10, 2017 he had a repeat cardiac catheterization on March 15, 2017 by Dr. Dillon Pritchett with the following findings:     · Coronary Angiography:  · The coronary circulation was right dominant. · Left main: Diffuse 30-40%. · Left anterior descending: Occluded after the first diagonal.  · Diagonal Branches: Long large vessel with 2 serial 50% lesions. · Circumflex: Mild 30-40%. · Obtuse Marginal Branches: Angiographically normal.  · Right coronary: 100%. · LIMA to LAD: small vessel with NICOLE 2 flow, diffuse 50%. · SVG to OM: Aortotomy site occluded. · SVG to RCA: Patent with patent stents.      The above findings demonstrated moderate diffuse disease without critical stenosis. The new abnormality and the nuclear scan coincided with a diagonal vessel lesion, but there is no significant stenosis noted so continued medical therapy was recommended.     He comes in today with his wife and apparently has been slowly deteriorating in terms of his functional capacity. He does have some degree of Alzheimer's dementia and is quite inactive according to his wife some days will sleep all day long and other days he is more interactive, but in general his activity level has significantly decreased in the last several months.   He has come off of statin drugs due to the potential relationship to worsening memory issues. He did have a community-acquired pneumonia back in August 2017 and was found to have significant gallstone issues for which she had a cholecystotomy tube placed since he was felt to be a poor surgical candidate at that time. He really has no cardiovascular complaints at all today. Encounter Diagnoses   Name Primary?  Atherosclerosis of native coronary artery of native heart without angina pectoris     Essential hypertension, benign Yes    Dyslipidemia         Discussion: This gentleman appears to be doing about as well as we could expect and I really have no recommendations for change at this time. He is not having any symptoms to suggest the development of symptomatic obstructive coronary disease, but at this juncture if he should develop symptoms we would tend to be more conservative in our management in view of his deteriorating functional capacity and dementia issues. Since he is not taking statin drugs anymore there is no point in discussion of cholesterol management. His blood pressure is well-controlled today and his EKG is stable with the right bundle branch which she had previously having resolved suggesting that his conduction system disease may still be fairly mild and so I am simply going to plan to see him again in several months. PCP: Paradise Kendall MD       Past Medical History:   Diagnosis Date    3-vessel coronary artery disease 02/13/2004    Tech limited. Low normal to mildly depressed LV systolic function. LVH. DDfx. LAE. PASP 25-30. 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 mLAD 100%. CX diffuse nonobstructive. OM1 patent stent. pRCA 100%. Dbl seq SVG to D1, OM1 100%. SVG to pRPDA 99% (3.5 x 23-mm Xience stent). Patent mid segment stent. LIMA to LAD ok.       Cardiac echocardiogram 11/16/2014    [place]:  EF 55-60%. DDfx. Normal RVSP. Mild TR. Neg bubble study for atrial shunt. No significant valvular pathology.  Cardiac nuclear imaging test, high risk 06/15/2012    Lg area of mod mid to distal anterior ischemia. Lg area of inferior ischemia. EF 57%. Mild mid-distal anterior, inferior hypk.   High risk pharm stress test.    Cervical spine pain     Deviated septum     s/p corrective surgery 7/1/2010    Disc disease, degenerative, cervical     joint pain, back pain    Dysphagia, unspecified(787.20)     Essential hypertension, benign     Gout with other specified manifestations(274.89)     Head injury     frequent headaches    Hypercholesterolemia     Insomnia, unspecified     Lumbar canal stenosis     Meniere's disease     Myocardial infarction, anterior wall (HCC)     DIEGO (obstructive sleep apnea)     on CPAP    S/P diskectomy 7/5/07    cervical    Spinal stenosis, lumbar region, without neurogenic claudication     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     Unspecified sinusitis (chronic)     Ventral hernia, unspecified, without mention of obstruction or gangrene     S/p repair 6/2003         Past Surgical History:   Procedure Laterality Date    CARDIAC CATHETERIZATION  3/15/2017         CARDIAC SURG PROCEDURE UNLIST  1/04    angioplasty of a proximal left anterior descending    CARDIAC SURG PROCEDURE UNLIST  6/2/08    angioplasty and stenting of totally obstructed saphenous vein graft to the posterior descending artery    CORONARY ARTERY ANGIOGRAM  3/15/2017         HX APPENDECTOMY      HX CATARACT REMOVAL      bilateral    HX CERVICAL DISKECTOMY  7/07    and fusion    HX CORONARY ARTERY BYPASS GRAFT  4/14/94    X4, with LIMA to the LAD, double sequential to the diagonal and circumflex and single to the main right coronary artery    HX CORONARY STENT PLACEMENT  6/20/2012    HX GI  1992    two feet of colon removed  HX HEART CATHETERIZATION  6/02/08    HX HEART CATHETERIZATION  6/20/2012    HX HEENT  7/1/10    deviated nasal septum surgery    HX HERNIA REPAIR  7/2003    two prior hernia repairs in the past    HX OTHER SURGICAL      HX PTCA  6/20/2012    HX TONSILLECTOMY      as a child    [place] [address] [facility]      for crushed disk         Current Outpatient Rx   Name  Route  Sig  Dispense  Refill    NITROSTAT 0.4 mg SL tablet        DISSOLVE 1 TABLET UNDER THE TONGUE EVERY 5 MINUTES AS NEEDED    25 Tab    0      indapamide (LOZOL) 1.25 mg tablet        TAKE 1 TABLET BY MOUTH DAILY    30 Tab    2      oxyCODONE-acetaminophen (PERCOCET)  mg per tablet    Oral    Take 1 Tab by mouth four (4) times daily as needed for Pain. Max Daily Amount: 4 Tabs. 120 Tab    0      simvastatin (ZOCOR) 40 mg tablet        TAKE 1 TABLET BY MOUTH ONCE DAILY    30 Tab    6      ramipril (ALTACE) 10 mg capsule        TAKE 1 CAPSULE BY MOUTH ONCE DAILY    30 Cap    11      allopurinol (ZYLOPRIM) 300 mg tablet        TAKE 1 TABLET BY MOUTH ONCE DAILY    90 Tab    3      metoprolol (LOPRESSOR) 50 mg tablet    Oral    Take 1 Tab by mouth two (2) times a day. 60 Tab    6      niacin (NIASPAN) 1,000 mg Tb24 tab    Oral    Take 1 tablet by mouth nightly. 30 tablet    11      MULTIVITAMINS W-MINERALS/LUT (CENTRUM SILVER PO)    Oral    Take 1 Tab by mouth daily.  aspirin delayed-release 81 mg tablet    Oral    Take 81 mg by mouth daily. No Known Allergies    Social   Social History   Substance Use Topics    Smoking status: Never Smoker    Smokeless tobacco: Never Used    Alcohol use Yes     Family history: family history includes Heart Disease in his father; Stroke in his father. Review of Systems:    Constitutional: Positive for diaphoresis, malaise/fatigue and weight loss. Negative for chills and fever. Respiratory: Negative. Cardiovascular: Positive for orthopnea. Negative for chest pain, palpitations, claudication and leg swelling. Gastrointestinal: Negative. Musculoskeletal: Negative. Neurological: Positive for weakness. Physical Exam:   The patient is an alert, oriented, well developed, well nourished 80 y.o.  male who was in no acute distress at the time of my examination. Visit Vitals    /52    Pulse (!) 59    Ht 5' 11\" (1.803 m)    Wt 83.9 kg (185 lb)    SpO2 97%    BMI 25.8 kg/m2      BP Readings from Last 3 Encounters:   11/06/17 112/52   10/06/17 128/68   09/28/17 142/68        Wt Readings from Last 3 Encounters:   11/06/17 83.9 kg (185 lb)   10/06/17 88.9 kg (196 lb)   09/15/17 90.7 kg (200 lb)     HEENT:  Conjuctiva white, mucosa moist, no pallor or cyanosis. Neck: Rather thick neck that is difficult to evaluate for jugular venous distention. No carotid bruits. Cardiovascular: Symmetrical with good excursion. There is a mid-sternotomy scar from distant past surgery. [place] appears to be displaced between the midclavicular line and anterior axillary line in the 5th left intercostal space. Normal S1 and S2, without appreciable murmurs, rubs, clicks or gallops auscultated. Lungs: Few rales in the right base, otherwise the lungs are clear to auscultation. Abdomen: Protuberant with a midline scar from ventral hernia surgery. Soft, but with mild epigastric tenderness. No masses or organomegaly. Extremities: No edema, with 1-2 + peripheral pulses. Review of Data: Please refer to past medical history for most recent cardiac testing.     Lab Results   Component Value Date/Time    Cholesterol, total 135 06/24/2016 03:19 PM    HDL Cholesterol 41 06/24/2016 03:19 PM    LDL, calculated 63.4 06/24/2016 03:19 PM    VLDL, calculated 30.6 06/24/2016 03:19 PM    Triglyceride 153 06/24/2016 03:19 PM    CHOL/HDL Ratio 3.3 06/24/2016 03:19 PM       Results for orders placed or performed in visit on 11/06/17   AMB POC EKG ROUTINE W/ 12 LEADS, INTER & REP     Status: None    Narrative    Sinus bradycardia rate 59. Low voltage in the limb leads. Poor R-wave progression anteriorly. Compared to the EKG of August 16, 2017 the complete right bundle branch block seen at that time is no longer present. Viktor Watkins D.O., FNIRMALAC. [facility]  [facility] [place]  [facility] [address] [address]  [address]    PLEASE NOTE:  This document has been produced using voice recognition software. Unrecognized errors in transcription may be present.

## 2017-11-06 NOTE — MR AVS SNAPSHOT
Visit Information Date & Time Provider Department Dept. Phone Encounter #  
 11/6/2017  3:20 PM Rebecca Rizvi, [address] [facility] [id number] Follow-up Instructions Return in about 6 months (around 5/6/2018), or if symptoms worsen or fail to improve. Your Appointments 11/17/2017  1:15 PM  
Follow Up with Austin Fontenot MD  
[facility]IC[facility] OF [facility]) Appt Note: 1 mo f/u  
 [address] [facility], [address] [place] [address]  
[phone]  
  
   
 [address] [facility], [address] [facility] [address]  
  
    
 11/29/2017  2:15 PM  
Follow Up with Chanel Stevenson MD  
[facility]) Appt Note: 3 MONTH FU / NO COPAY PT AWARE  
 [address] [facility] [address]  
[phone]  
  
   
 [address] [facility] [facility] [address] Upcoming Health Maintenance Date Due DTaP/Tdap/Td series (1 - Tdap) 12/13/1956 INFLUENZA AGE 9 TO ADULT 8/1/2017 Pneumococcal 65+ Low/Medium Risk (2 of 2 - PCV13) 9/7/2017 MEDICARE YEARLY EXAM 6/22/2018 GLAUCOMA SCREENING Q2Y 6/20/2019 Allergies as of 11/6/2017  Review Complete On: 11/6/2017 By: Rebecca Rizvi, DO No Known Allergies Current Immunizations  Reviewed on 7/24/2017 Name Date Influenza Vaccine (Quad) PF 9/7/2016, 9/14/2015 Pneumococcal Polysaccharide (PPSV-23) 9/7/2016 Zoster Vaccine, Live 11/28/2016 Not reviewed this visit You Were Diagnosed With   
  
 Codes Comments Essential hypertension, benign    -  Primary ICD-10-CM: I10 
ICD-9-CM: 401.1 Atherosclerosis of native coronary artery of native heart without angina pectoris     ICD-10-CM: I25.10 ICD-9-CM: 414.01 Angina pectoris ([address]     ICD-10-CM: I20.9 ICD-9-CM: 413.9 Dyslipidemia     ICD-10-CM: E78.5 ICD-9-CM: 272.4 Vitals BP Pulse Height(growth percentile) Weight(growth percentile) SpO2 BMI  
 112/52 (!) 59 5' 11\" (1.803 m) 185 lb (83.9 kg) 97% 25.8 kg/m2 Smoking Status Never Smoker Vitals History BMI and BSA Data Body Mass Index Body Surface Area  
 25.8 kg/m 2 2.05 m 2 Preferred Pharmacy Pharmacy Name Phone [address], [address] [place], [place] [address]way [phone] Your Updated Medication List  
  
   
This list is accurate as of: 11/6/17  4:17 PM.  Always use your most recent med list.  
  
  
  
  
 allopurinol 300 mg tablet Commonly known as:  ZYLOPRIM  
TAKE 1 TABLET BY MOUTH ONCE DAILY  
  
 aspirin delayed-release 81 mg tablet Take 81 mg by mouth two (2) times a day. CENTRUM SILVER PO Take 1 Tab by mouth daily. donepezil 10 mg tablet Commonly known as:  ARICEPT Take 1 Tab by mouth nightly. furosemide 20 mg tablet Commonly known as:  LASIX Take  by mouth daily. haloperidol 0.5 mg tablet Commonly known as:  HALDOL  
1 tablet at bedtime  
  
 metoprolol tartrate 50 mg tablet Commonly known as:  LOPRESSOR  
TAKE 1 TABLET BY MOUTH 2 TIMES A DAY  
  
 NITROSTAT 0.4 mg SL tablet Generic drug:  nitroglycerin  
by SubLINGual route every five (5) minutes as needed for Chest Pain. oxyCODONE ER 20 mg ER tablet Commonly known as:  OxyCONTIN Take 1 Tab by mouth every twelve (12) hours. Max Daily Amount: 40 mg. Indications: Chronic Pain  
  
 oxyCODONE-acetaminophen  mg per tablet Commonly known as:  PERCOCET Take 1 Tab by mouth every eight (8) hours as needed for Pain ((breakthrough pain)). Max Daily Amount: 3 Tabs. Indications: Pain  
  
 potassium chloride 10 mEq tablet Commonly known as:  KLOR-CON  
TAKE 1 TABLET BY MOUTH 2 TIMES A DAY  
  
 ursodiol 300 mg capsule Commonly known as:  ACTIGALL  
1 capsule twice per day We Performed the Following AMB POC EKG ROUTINE W/ 12 LEADS, INTER & REP [38205 CPT(R)] Follow-up Instructions Return in about 6 months (around 5/6/2018), or if symptoms worsen or fail to improve. To-Do List   
 12/15/2017 11:00 AM  
  Appointment with [facility] CATH LAB HOLDING; [facility] NO ANESTHESIA; [facility] IR VAS SURGEON; [address] at [facility] RAD [address] ([phone]) DIET RESTRICTIONS - Do not eat or drink for 6 hours prior to procedure. - You may take meds with sips of water. For meds to be taken with food, take the night before your procedure. GENERAL INSTRUCTIONS - If you are on a blood thinner, you must contact your doctor to determine if you may discontinue your blood thinner prior to your exam.  Failure to contact your doctor may result in a reschedule of your appointment. - Leave all valuables at home. - Bring an updated list of your medications including over the Counter and herbal therapies. - If you  have a hand-written prescription for this exam, you must bring it with you to your appointment. - You may be responsible for any co-payments and deductibles as indicated by your insurance carrier. - Only patients will be allowed in the exam room, including children. - Children under the age of 15 may not be left unattended. - Bring all relevant prior images from facilities outside of [facility]. Failure to provide images may delay reading by Radiologist. - [facility] patients must have an armband and be accompanied by a caregiver or family member. APPOINTMENT CANCELLATION - To reschedule or cancel an appointment, please contact the Scheduling department at [phone]. Introducing [facility]! [place] Crowley introduces [facility] patient portal. Now you can access parts of your medical record, email your doctor's office, and request medication refills online. 1. In your internet browser, go to [email or web address] 2. Click on the First Time User? Click Here link in the Sign In box. You will see the New Member Sign Up page. 3. Enter your [employer] Access Code exactly as it appears below. You will not need to use this code after youve completed the sign-up process. If you do not sign up before the expiration date, you must request a new code. · Netflix Access Code: [email or web address] Expires: 11/12/2017  4:34 PM 
 
4. Enter the last four digits of your Social Security Number (xxxx) and Date of Birth (mm/dd/yyyy) as indicated and click Submit. You will be taken to the next sign-up page. 5. Create a Netflix ID. This will be your Netflix login ID and cannot be changed, so think of one that is secure and easy to remember. 6. Create a Netflix password. You can change your password at any time. 7. Enter your Password Reset Question and Answer. This can be used at a later time if you forget your password. 8. Enter your e-mail address. You will receive e-mail notification when new information is available in [address] 9. Click Sign Up. You can now view and download portions of your medical record. 10. Click the Download Summary menu link to download a portable copy of your medical information. If you have questions, please visit the Frequently Asked Questions section of the Netflix website. Remember, Netflix is NOT to be used for urgent needs. For medical emergencies, dial 911. Now available from your iPhone and Android! Please provide this summary of care documentation to your next provider. Your primary care clinician is listed as Jonathan Maria. If you have any questions after today's visit, please call [phone].

## 2017-11-16 PROBLEM — G45.9 TIA (TRANSIENT ISCHEMIC ATTACK): Status: ACTIVE | Noted: 2017-01-01

## 2017-11-16 PROBLEM — D72.829 LEUKOCYTOSIS: Status: ACTIVE | Noted: 2017-01-01

## 2017-11-19 NOTE — PROGRESS NOTES
Transitions of Care Coordination    Booker Dickinson was hospitalized at [facility] 11/16-11/18/17 for a TIA, chronic cholecystitis, hx of CVA, vascular dementia and discharged to home with [address] RRAT = 26    The Discharge Summary is not available. Kalyan Valentin is a 80 y.o. male. This patient was received as a referral from inpatient admission. Summary of patients top three medical problems:     Problem 1: chronic cholecystitis     Problem 2: vascular dementia     Problem 3: leukocytosis    Patient's challenges to self management identified: cognitive impairment. Patients motivational level on a scale of 0-10: unable to assess    Medication Management: Patient's wife has a good adherence and good understanding and manages medications. Advance Care Planning: The patient does not have an ACP on file. The patient does have a DDNR on file and a decision maker is listed. [employer], Referrals, and Durable Medical Equipment: [facility] was ordered on discharge. Wife stated she does not think the patient needs home care services. [address] [place] [address] was in the home for approximately 3 months and has just concluded service. Follow up appointments:  Patient's wife prefers to contact the office on 11/20/17 to schedule an appointment with Dr. Tank Cunningham. Reached patient's wife, Teresita Barnard (HIPPA verified,) and identified self/role. Wife stated patient is feeling much better after discharge from the hospital.  New medications have been obtained and patient is taking all medications as directed. Patient has a cholecystotomy tube in place and wife is independent with management. Patient is able to dress and bathe self but is dependent on wife for cooking, shopping, money management, complex decision making.     Wife understands to contact the PCP (or go to the ED) if patient develops fever, chills, increased respiratory or heart rate, SOB, increased confusion. Patient's wife verbalized understanding of all information discussed and has this Nurse Navigators contact information for any further questions, concerns, or needs. Goals        Post Hospitalization     Attends follow-up appointments as directed. Plan:  Ensure patient has PCp F/U apt.  Prevent complications post hospitalization. Plan:  Educate wife on reportable s/s-done 11/19/17            Patient's wife verbalized understanding of all information discussed and has this Nurse Navigators contact information for any further questions, concerns, or needs.

## 2017-11-21 NOTE — PROGRESS NOTES
The patient is being evaluated by me today at home for the chief complaint of right upper quadrant abdominal pain    HPI     The patient was recently hospitalized at [facility] for a TIA. The patient was discharged on 11/18/2017 and contacted by Faye Farfan on 11/19/117 for follow up. An appointment was made for the patient to see me today. I saw the patient at home due to a heavy office schedule and the patient's generalized weakness. The patient was admitted on 11/16/17 for rapidlyl progressive left eye lid drooping that occurred over several hours. The patient also developed chills and fever. The patient's symptoms recovered over one day. Evaluation for a stroke was negative. A clear source of infection was not found. The patient was treated with antibiotic with resolution of fever. The patient was discharged on antibiotics. The patient persists with right upper quadrant abdominal pain. The drainage tube remains in place. Actigall is of no help to the abdominal symptoms. The patient persists with low back pain with a stiff back. The patient has been treated for hypertension. Recently his BP has been low. The patient has coronary artery disease. The patient denies chest pain. Review of Systems   Respiratory: Negative for shortness of breath. Cardiovascular: Negative for chest pain and leg swelling. Gastrointestinal: Positive for abdominal pain. Musculoskeletal: Positive for back pain. No Known Allergies    Current Outpatient Prescriptions   Medication Sig Dispense Refill    potassium chloride (KLOR-CON) 10 mEq tablet Take 1 Tab by mouth three (3) times daily. 90 Tab 1    ergocalciferol (ERGOCALCIFEROL) 50,000 unit capsule Take 1 Cap by mouth every seven (7) days. 4 Cap 3    levoFLOXacin (LEVAQUIN) 500 mg tablet Take 1 Tab by mouth daily.  5 Tab 0    nitroglycerin (NITROSTAT) 0.4 mg SL tablet by SubLINGual route every five (5) minutes as needed for Chest Pain.  furosemide (LASIX) 20 mg tablet Take  by mouth daily.  metoprolol tartrate (LOPRESSOR) 50 mg tablet TAKE 1 TABLET BY MOUTH 2 TIMES A DAY (Patient taking differently: TAKE 1 TABLET BY MOUTH 2 TIMES A DAY, reports 25mg BID) 60 Tab 11    donepezil (ARICEPT) 10 mg tablet Take 1 Tab by mouth nightly. 30 Tab 0    haloperidol (HALDOL) 0.5 mg tablet 1 tablet at bedtime 30 Tab 2    oxyCODONE-acetaminophen (PERCOCET)  mg per tablet Take 1 Tab by mouth every eight (8) hours as needed for Pain ((breakthrough pain)). Max Daily Amount: 3 Tabs. Indications: Pain 90 Tab 0    allopurinol (ZYLOPRIM) 300 mg tablet TAKE 1 TABLET BY MOUTH ONCE DAILY 90 Tab 3    aspirin delayed-release 81 mg tablet Take 81 mg by mouth two (2) times a day.  MULTIVITAMINS W-MINERALS/LUT (CENTRUM SILVER PO) Take 1 Tab by mouth daily.  ursodiol (ACTIGALL) 300 mg capsule 1 capsule twice per day 60 Cap 3    oxyCODONE ER (OXYCONTIN) 20 mg ER tablet Take 1 Tab by mouth every twelve (12) hours. Max Daily Amount: 40 mg. Indications: Chronic Pain 60 Tab 0       Past Medical History:   Diagnosis Date    3-vessel coronary artery disease 02/13/2004    Tech limited. Low normal to mildly depressed LV systolic function. LVH. DDfx. LAE. PASP 25-30. 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 mLAD 100%. CX diffuse nonobstructive. OM1 patent stent. pRCA 100%. Dbl seq SVG to D1, OM1 100%. SVG to pRPDA 99% (3.5 x 23-mm Xience stent). Patent mid segment stent. LIMA to LAD ok.  Cardiac echocardiogram 11/16/2014    [facility]:  EF 55-60%. DDfx. Normal RVSP. Mild TR. Neg bubble study for atrial shunt. No significant valvular pathology.     Cardiac nuclear imaging test, high risk 06/15/2012    Lg area of mod mid to distal anterior ischemia. Lg area of inferior ischemia. EF 57%. Mild mid-distal anterior, inferior hypk.   High risk pharm stress test.    Cervical spine pain     Deviated septum     s/p corrective surgery 7/1/2010    Disc disease, degenerative, cervical     joint pain, back pain    Dysphagia, unspecified(787.20)     Essential hypertension, benign     Gout with other specified manifestations(274.89)     Head injury     frequent headaches    Hypercholesterolemia     Insomnia, unspecified     Lumbar canal stenosis     Meniere's disease     Myocardial infarction, anterior wall (HCC)     DIEGO (obstructive sleep apnea)     on CPAP    S/P diskectomy 7/5/07    cervical    Spinal stenosis, lumbar region, without neurogenic claudication     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     Unspecified sinusitis (chronic)     Ventral hernia, unspecified, without mention of obstruction or gangrene     S/p repair 6/2003       Past Surgical History:   Procedure Laterality Date    CARDIAC CATHETERIZATION  3/15/2017         CARDIAC SURG PROCEDURE UNLIST  1/04    angioplasty of a proximal left anterior descending    CARDIAC SURG PROCEDURE UNLIST  6/2/08    angioplasty and stenting of totally obstructed saphenous vein graft to the posterior descending artery    CORONARY ARTERY ANGIOGRAM  3/15/2017         HX APPENDECTOMY      HX CATARACT REMOVAL      bilateral    HX CERVICAL DISKECTOMY  7/07    and fusion    HX CORONARY ARTERY BYPASS GRAFT  4/14/94    X4, with LIMA to the LAD, double sequential to the diagonal and circumflex and single to the main right coronary artery    HX CORONARY STENT PLACEMENT  6/20/2012    HX GI  1992    two feet of colon removed    HX HEART CATHETERIZATION  6/02/08    HX HEART CATHETERIZATION  6/20/2012    HX HEENT  7/1/10    deviated nasal septum surgery    HX HERNIA REPAIR  7/2003    two prior hernia repairs in the past    HX OTHER SURGICAL      HX PTCA  6/20/2012   [place] HX TONSILLECTOMY      as a child    [facility] [address] [facility]       Social History     Social History    Marital status:      Spouse name: N/A    Number of children: N/A    Years of education: N/A     Occupational History    Not on file. Social History Main Topics    Smoking status: Never Smoker    Smokeless tobacco: Never Used    Alcohol use Yes    Drug use: No    Sexual activity: No     Other Topics Concern    Not on file     Social History Narrative       Patient does have an advanced directive on file    Visit Vitals    /58 (BP 1 Location: Right arm, BP Patient Position: Sitting)    Pulse 68    SpO2 100%       Physical Exam   Cardiovascular: Normal rate and regular rhythm. Exam reveals no gallop. No murmur heard. Pulmonary/Chest: He has no wheezes. He has no rales. Abdominal: Soft. He exhibits no distension. There is tenderness (right upper quaadrant). Musculoskeletal: He exhibits no edema.    Back with limited flexion       Admission on 11/16/2017, Discharged on 11/18/2017   Component Date Value Ref Range Status    Ventricular Rate 11/16/2017 87  BPM Final    Atrial Rate 11/16/2017 87  BPM Final    P-R Interval 11/16/2017 192  ms Final    QRS Duration 11/16/2017 144  ms Final    Q-T Interval 11/16/2017 422  ms Final    QTC Calculation (Bezet) 11/16/2017 507  ms Final    Calculated P Axis 11/16/2017 50  degrees Final    Calculated R Axis 11/16/2017 -13  degrees Final    Calculated T Axis 11/16/2017 66  degrees Final    Diagnosis 11/16/2017    Final                    Value:Normal sinus rhythm  Right bundle branch block  Abnormal ECG  When compared with ECG of 16-AUG-2017 06:52,  No significant change was found  Confirmed by Jody Wong M.D., Alo Thompson ([id number]) on 11/16/2017 12:23:25 PM      WBC 11/16/2017 15.5* 4.0 - 11.0 1000/mm3 Final    RBC 11/16/2017 4.60  3.80 - 5.70 M/uL Final    HGB 11/16/2017 14.0  12.4 - 17.2 gm/dl Final    HCT 11/16/2017 40.7  37.0 - 50.0 % Final    MCV 11/16/2017 88.5  80.0 - 98.0 fL Final    MCH 11/16/2017 30.4  23.0 - 34.6 pg Final    MCHC 11/16/2017 34.4  30.0 - 36.0 gm/dl Final    PLATELET 23/71/3458 293  140 - 450 1000/mm3 Final    MPV 11/16/2017 10.8* 6.0 - 10.0 fL Final    RDW-SD 11/16/2017 43.6  35.1 - 43.9   Final    NRBC 11/16/2017 0  0 - 0   Final    IMMATURE GRANULOCYTES 11/16/2017 0.7  0.0 - 3.0 % Final    Comment: IG - Immature granulocytes (promyelocytes + myelocytes + metamyelocytes), their presence  indicates a left shift. An IG > 3% may predict positive blood cultures with 98% specificity.  (P<0.04) and 92% Positive Predictive Value (Kelsey-Jacquie)1. Increased immature granulocytes  assist with the detection of infection and/or inflammation and may be present at an early stage  and are more sensitive and specific than band counts.  NEUTROPHILS 11/16/2017 88.5* 34 - 64 % Final    LYMPHOCYTES 11/16/2017 4.5* 28 - 48 % Final    MONOCYTES 11/16/2017 5.7  1 - 13 % Final    EOSINOPHILS 11/16/2017 0.1  0 - 5 % Final    BASOPHILS 11/16/2017 0.5  0 - 3 % Final    Sodium 11/16/2017 139  136 - 145 mEq/L Final    Potassium 11/16/2017 3.5  3.5 - 5.1 mEq/L Final    Chloride 11/16/2017 100  98 - 107 mEq/L Final    CO2 11/16/2017 29  21 - 32 mEq/L Final    Glucose 11/16/2017 125* 74 - 106 mg/dl Final    BUN 11/16/2017 24  7 - 25 mg/dl Final    Creatinine 11/16/2017 0.7  0.6 - 1.3 mg/dl Final    GFR est AA 11/16/2017 >60    Final    Comment: THE [employer]ING GROUP STATES THAT THE MDRD STUDY EQUATION SHOULD ONLY BE USED ON  INDIVIDUALS 18 OR OLDER. THE REPORT ALSO NOTES THAT THE MDRD STUDY EQUATION HAS NOT BEEN  VALIDATED FOR USE WITH THE ELDERLY (OVER 79YEARS OF AGE), PREGNANT WOMEN, PATIENTS WITH SERIOUS  COMORBID CONDITIONS, OR PERSONS WITH EXTREMES OF BODY SIZE, MUSCLE MASS, OR NUTRITIONAL STATUS.   APPLICATION OF THE EQUATION TO THESE PATIENT GROUPS MAY LEAD TO ERRORS IN GFR ESTIMATION. GFR  ESTIMATING EQUATIONS HAVE POORER AGREEMENT WITH MEASURED GFR FOR ILL HOSPITALIZED PATIENTS AND  FOR PEOPLE WITH NEAR NORMAL KIDNEY FUNCTION THAN FOR SUBJECTS IN THE MDRD STUDY. VALIDATION  STUDIES ARE IN PROGRESS TO EVALUATE THE MDRD STUDY EQUATION FOR ADDITIONAL ETHNIC GROUPS, THE  ELDERLY, VARIOUS DISEASE CONDITIONS, AND PEOPLE WITH NORMAL KIDNEY FUNCTION. GFRA----REFERS TO   GFRO---REFERS TO OTHER RACES  REFERENCES AVAILABLE UPON REQUEST.      GFR est non-AA 11/16/2017 >60    Final    Calcium 11/16/2017 9.0  8.5 - 10.1 mg/dl Final    AST (SGOT) 11/16/2017 21  15 - 37 U/L Final    ALT (SGPT) 11/16/2017 54  12 - 78 U/L Final    Alk.  phosphatase 11/16/2017 130* 45 - 117 U/L Final    Bilirubin, total 11/16/2017 1.0  0.2 - 1.0 mg/dl Final    Protein, total 11/16/2017 7.1  6.4 - 8.2 gm/dl Final    Albumin 11/16/2017 3.7  3.4 - 5.0 gm/dl Final    Blood Culture Result 11/16/2017 No Growth at 4 days    Incomplete    Blood Culture Result 11/16/2017 No Growth at 4 days    Incomplete    Lactic Acid 11/16/2017 1.4  0.4 - 2.0 mmol/L Final    Glucose 11/16/2017 Negative  NEGATIVE,Negative mg/dl Final    Bilirubin 11/16/2017 Negative  NEGATIVE,Negative   Final    Ketone 11/16/2017 Trace* NEGATIVE,Negative mg/dl Final    Specific gravity 11/16/2017 <=1.005  1.005 - 1.030   Final    Blood 11/16/2017 Moderate* NEGATIVE,Negative   Final    pH (UA) 11/16/2017 5.0  5 - 9   Final    Protein 11/16/2017 Negative  NEGATIVE,Negative mg/dl Final    Urobilinogen 11/16/2017 0.2  0.0 - 1.0 EU/dl Final    Nitrites 11/16/2017 Negative  NEGATIVE,Negative   Final    Leukocyte Esterase 11/16/2017 Negative  NEGATIVE,Negative   Final    Color 11/16/2017 Yellow    Final    Appearance 11/16/2017 Clear    Final    : [id number]    Hemoglobin A1c 11/16/2017 6.1* 4.8 - 6.0 % Final    Glucose (POC) 11/16/2017 112* 65 - 105 mg/dL Final    Comment: Notified Nurse  : [id number]      Glucose (POC) 11/16/2017 134* 65 - 105 mg/dL Final    Comment: Notified Nurse  : [id number]      WBC 11/17/2017 9.7  4.0 - 11.0 1000/mm3 Final    RBC 11/17/2017 3.94  3.80 - 5.70 M/uL Final    HGB 11/17/2017 11.8* 12.4 - 17.2 gm/dl Final    HCT 11/17/2017 35.7* 37.0 - 50.0 % Final    MCV 11/17/2017 90.6  80.0 - 98.0 fL Final    MCH 11/17/2017 29.9  23.0 - 34.6 pg Final    MCHC 11/17/2017 33.1  30.0 - 36.0 gm/dl Final    PLATELET 81/39/3762 039  140 - 450 1000/mm3 Final    MPV 11/17/2017 11.4* 6.0 - 10.0 fL Final    RDW-SD 11/17/2017 44.8* 35.1 - 43.9   Final    NRBC 11/17/2017 0  0 - 0   Final    IMMATURE GRANULOCYTES 11/17/2017 0.5  0.0 - 3.0 % Final    Comment: IG - Immature granulocytes (promyelocytes + myelocytes + metamyelocytes), their presence  indicates a left shift. An IG > 3% may predict positive blood cultures with 98% specificity.  (P<0.04) and 92% Positive Predictive Value (Meghan)1. Increased immature granulocytes  assist with the detection of infection and/or inflammation and may be present at an early stage  and are more sensitive and specific than band counts.  NEUTROPHILS 11/17/2017 77.9* 34 - 64 % Final    LYMPHOCYTES 11/17/2017 15.4* 28 - 48 % Final    MONOCYTES 11/17/2017 5.7  1 - 13 % Final    EOSINOPHILS 11/17/2017 0.4  0 - 5 % Final    BASOPHILS 11/17/2017 0.1  0 - 3 % Final    Sodium 11/17/2017 142  136 - 145 mEq/L Final    Potassium 11/17/2017 3.5  3.5 - 5.1 mEq/L Final    Chloride 11/17/2017 107  98 - 107 mEq/L Final    CO2 11/17/2017 28  21 - 32 mEq/L Final    Glucose 11/17/2017 107* 74 - 106 mg/dl Final    BUN 11/17/2017 17  7 - 25 mg/dl Final    Creatinine 11/17/2017 0.6  0.6 - 1.3 mg/dl Final    GFR est AA 11/17/2017 >60    Final    Comment: THE [employer]ING GROUP STATES THAT THE MDRD STUDY EQUATION SHOULD ONLY BE USED ON  INDIVIDUALS 18 OR OLDER.  THE REPORT ALSO NOTES THAT THE MDRD STUDY EQUATION HAS NOT BEEN  VALIDATED FOR USE WITH THE ELDERLY (OVER 79YEARS OF AGE), PREGNANT WOMEN, PATIENTS WITH SERIOUS  COMORBID CONDITIONS, OR PERSONS WITH EXTREMES OF BODY SIZE, MUSCLE MASS, OR NUTRITIONAL STATUS. APPLICATION OF THE EQUATION TO THESE PATIENT GROUPS MAY LEAD TO ERRORS IN GFR ESTIMATION. GFR  ESTIMATING EQUATIONS HAVE POORER AGREEMENT WITH MEASURED GFR FOR ILL HOSPITALIZED PATIENTS AND  FOR PEOPLE WITH NEAR NORMAL KIDNEY FUNCTION THAN FOR SUBJECTS IN THE MDRD STUDY. VALIDATION  STUDIES ARE IN PROGRESS TO EVALUATE THE MDRD STUDY EQUATION FOR ADDITIONAL ETHNIC GROUPS, THE  ELDERLY, VARIOUS DISEASE CONDITIONS, AND PEOPLE WITH NORMAL KIDNEY FUNCTION. GFRA----REFERS TO   GFRO---REFERS TO OTHER RACES  REFERENCES AVAILABLE UPON REQUEST.      GFR est non-AA 11/17/2017 >60    Final    Calcium 11/17/2017 8.5  8.5 - 10.1 mg/dl Final    Cholesterol, total 11/17/2017 75* 140 - 199 mg/dl Final    HDL Cholesterol 11/17/2017 36* 40 - 96 mg/dl Final    Triglyceride 11/17/2017 128  29 - 150 mg/dl Final    LDL, calculated 11/17/2017 13  0 - 130 mg/dl Final    Comment: TARGET/DECISION VALUES DEPEND ON A NUMBER OF RISK FACTORS. LDL CALCULATION NOT VALID IF TRIGLYCERIDES ARE GREATER THAN 400 mg/dL.       CHOL/HDL Ratio 11/17/2017 2.1  0.0 - 5.0 Ratio Final    Glucose (POC) 11/17/2017 103  65 - 105 mg/dL Final    : [id number]    Glucose (POC) 11/17/2017 131* 65 - 105 mg/dL Final    Comment: Notified Nurse  : [id number]      Glucose (POC) 11/17/2017 115* 65 - 105 mg/dL Final    Comment: Notified Nurse  : [id number]      Glucose (POC) 11/17/2017 101  65 - 105 mg/dL Final    : [id number]    Vitamin B12 11/18/2017 500  193 - 986 pg/ml Final    Vitamin D, 25-OH, Total 11/18/2017 28.3* 30.0 - 100.0 ng/ml Final    Comment: Deficiency --  less than 20 ng/mL  Insufficiency --  20 -  29 ng/mL  Sufficiency --  30 -  100 ng/mL  Toxicity --  greater than 100 ng/mL  Deficiency --  less than 20 ng/mL  Insufficiency --  20 -  29 ng/mL  Sufficiency -- 30 -  100 ng/mL  Toxicity --  greater than 100 ng/mL      WBC 11/18/2017 7.5  4.0 - 11.0 1000/mm3 Final    RBC 11/18/2017 3.61* 3.80 - 5.70 M/uL Final    HGB 11/18/2017 10.8* 12.4 - 17.2 gm/dl Final    HCT 11/18/2017 32.6* 37.0 - 50.0 % Final    MCV 11/18/2017 90.3  80.0 - 98.0 fL Final    MCH 11/18/2017 29.9  23.0 - 34.6 pg Final    MCHC 11/18/2017 33.1  30.0 - 36.0 gm/dl Final    PLATELET 93/54/2915 046  140 - 450 1000/mm3 Final    MPV 11/18/2017 11.6* 6.0 - 10.0 fL Final    RDW-SD 11/18/2017 43.8  35.1 - 43.9   Final    NRBC 11/18/2017 0  0 - 0   Final    IMMATURE GRANULOCYTES 11/18/2017 0.5  0.0 - 3.0 % Final    Comment: IG - Immature granulocytes (promyelocytes + myelocytes + metamyelocytes), their presence  indicates a left shift. An IG > 3% may predict positive blood cultures with 98% specificity.  (P<0.04) and 92% Positive Predictive Value (Meghan)1. Increased immature granulocytes  assist with the detection of infection and/or inflammation and may be present at an early stage  and are more sensitive and specific than band counts.  NEUTROPHILS 11/18/2017 66.7* 34 - 64 % Final    LYMPHOCYTES 11/18/2017 24.2* 28 - 48 % Final    MONOCYTES 11/18/2017 5.7  1 - 13 % Final    EOSINOPHILS 11/18/2017 2.8  0 - 5 % Final    BASOPHILS 11/18/2017 0.1  0 - 3 % Final    Sodium 11/18/2017 140  136 - 145 mEq/L Final    Potassium 11/18/2017 3.4* 3.5 - 5.1 mEq/L Final    Chloride 11/18/2017 105  98 - 107 mEq/L Final    CO2 11/18/2017 29  21 - 32 mEq/L Final    Glucose 11/18/2017 89  74 - 106 mg/dl Final    BUN 11/18/2017 12  7 - 25 mg/dl Final    Creatinine 11/18/2017 0.5* 0.6 - 1.3 mg/dl Final    GFR est AA 11/18/2017 >60    Final    Comment: THE [employer] WORKING GROUP STATES THAT THE MDRD STUDY EQUATION SHOULD ONLY BE USED ON  INDIVIDUALS 18 OR OLDER.  THE REPORT ALSO NOTES THAT THE MDRD STUDY EQUATION HAS NOT BEEN  VALIDATED FOR USE WITH THE ELDERLY (OVER 79YEARS OF AGE), PREGNANT WOMEN, PATIENTS WITH SERIOUS  COMORBID CONDITIONS, OR PERSONS WITH EXTREMES OF BODY SIZE, MUSCLE MASS, OR NUTRITIONAL STATUS. APPLICATION OF THE EQUATION TO THESE PATIENT GROUPS MAY LEAD TO ERRORS IN GFR ESTIMATION. GFR  ESTIMATING EQUATIONS HAVE POORER AGREEMENT WITH MEASURED GFR FOR ILL HOSPITALIZED PATIENTS AND  FOR PEOPLE WITH NEAR NORMAL KIDNEY FUNCTION THAN FOR SUBJECTS IN THE MDRD STUDY. VALIDATION  STUDIES ARE IN PROGRESS TO EVALUATE THE MDRD STUDY EQUATION FOR ADDITIONAL ETHNIC GROUPS, THE  ELDERLY, VARIOUS DISEASE CONDITIONS, AND PEOPLE WITH NORMAL KIDNEY FUNCTION. GFRA----REFERS TO   GFRO---REFERS TO OTHER RACES  REFERENCES AVAILABLE UPON REQUEST.      GFR est non-AA 11/18/2017 >60    Final    Calcium 11/18/2017 8.4* 8.5 - 10.1 mg/dl Final    AST (SGOT) 11/18/2017 13* 15 - 37 U/L Final    ALT (SGPT) 11/18/2017 29  12 - 78 U/L Final    Alk.  phosphatase 11/18/2017 92  45 - 117 U/L Final    Bilirubin, total 11/18/2017 0.9  0.2 - 1.0 mg/dl Final    Protein, total 11/18/2017 5.5* 6.4 - 8.2 gm/dl Final    Albumin 11/18/2017 2.6* 3.4 - 5.0 gm/dl Final    Magnesium 11/18/2017 2.0  1.6 - 2.6 mg/dl Final    Phosphorus 11/18/2017 2.7  2.5 - 4.9 mg/dl Final    Glucose (POC) 11/18/2017 96  65 - 105 mg/dL Final    : [id number]    Glucose (POC) 11/18/2017 125* 65 - 105 mg/dL Final    Comment: Notified Nurse  : [id number] [id number] Outpatient Visit on 09/21/2017   Component Date Value Ref Range Status    Sodium 09/21/2017 144  136 - 145 mmol/L Final    Potassium 09/21/2017 4.4  3.5 - 5.5 mmol/L Final    Chloride 09/21/2017 106  100 - 108 mmol/L Final    CO2 09/21/2017 28  21 - 32 mmol/L Final    Anion gap 09/21/2017 10  3.0 - 18 mmol/L Final    Glucose 09/21/2017 113* 74 - 99 mg/dL Final    BUN 09/21/2017 16  7.0 - 18 MG/DL Final    Creatinine 09/21/2017 0.72  0.6 - 1.3 MG/DL Final    BUN/Creatinine ratio 09/21/2017 22* 12 - 20   Final    GFR est AA 09/21/2017 >60  >60 ml/min/1.73m2 Final    GFR est non-AA 09/21/2017 >60  >60 ml/min/1.73m2 Final    Comment: (NOTE)  Estimated GFR is calculated using the Modification of Diet in Renal   Disease (MDRD) Study equation, reported for both  Americans   (GFRAA) and non- Americans (GFRNA), and normalized to 1.73m2   body surface area. The physician must decide which value applies to   the patient. The MDRD study equation should only be used in   individuals age 25 or older. It has not been validated for the   following: pregnant women, patients with serious comorbid conditions,   or on certain medications, or persons with extremes of body size,   muscle mass, or nutritional status.  Calcium 09/21/2017 9.0  8.5 - 10.1 MG/DL Final    Bilirubin, total 09/21/2017 0.4  0.2 - 1.0 MG/DL Final    ALT (SGPT) 09/21/2017 16  16 - 61 U/L Final    AST (SGOT) 09/21/2017 12* 15 - 37 U/L Final    Alk. phosphatase 09/21/2017 65  45 - 117 U/L Final    Protein, total 09/21/2017 6.2* 6.4 - 8.2 g/dL Final    Albumin 09/21/2017 3.5  3.4 - 5.0 g/dL Final    Globulin 09/21/2017 2.7  2.0 - 4.0 g/dL Final    A-G Ratio 09/21/2017 1.3  0.8 - 1.7   Final    WBC 09/21/2017 12.1  4.6 - 13.2 K/uL Final    RBC 09/21/2017 4.06* 4.70 - 5.50 M/uL Final    HGB 09/21/2017 12.2* 13.0 - 16.0 g/dL Final    HCT 09/21/2017 38.0  36.0 - 48.0 % Final    MCV 09/21/2017 93.6  74.0 - 97.0 FL Final    MCH 09/21/2017 30.0  24.0 - 34.0 PG Final    MCHC 09/21/2017 32.1  31.0 - 37.0 g/dL Final    RDW 09/21/2017 13.2  11.6 - 14.5 % Final    PLATELET 71/98/9217 631  135 - 420 K/uL Final    MPV 09/21/2017 11.2  9.2 - 11.8 FL Final    NEUTROPHILS 09/21/2017 77* 40 - 73 % Final    LYMPHOCYTES 09/21/2017 14* 21 - 52 % Final    MONOCYTES 09/21/2017 6  3 - 10 % Final    EOSINOPHILS 09/21/2017 3  0 - 5 % Final    BASOPHILS 09/21/2017 0  0 - 2 % Final    ABS. NEUTROPHILS 09/21/2017 9.3* 1.8 - 8.0 K/UL Final    ABS.  LYMPHOCYTES 09/21/2017 1.7  0.9 - 3.6 K/UL Final    ABS. MONOCYTES 09/21/2017 0.8  0.05 - 1.2 K/UL Final    ABS. EOSINOPHILS 09/21/2017 0.4  0.0 - 0.4 K/UL Final    ABS. BASOPHILS 09/21/2017 0.0  0.0 - 0.06 K/UL Final    DF 09/21/2017 AUTOMATED    Final   Admission on 08/16/2017, Discharged on 08/21/2017   No results displayed because visit has over 200 results. .No results found for any visits on 11/20/17. Assessment / Plan:      ICD-10-CM ICD-9-CM    1. Spinal stenosis, lumbar region, without neurogenic claudication M48.061 724.02    2. Encephalopathy G93.40 348.30    3. Hypokalemia E87.6 276.8    4. Gallstones K80.20 574.20    5. Intractable abdominal pain R10.9 789.00    6. Other specified transient cerebral ischemias G45.8 435.8        he was advised to continue his maintenance medications  Medications were reconciled with the patient and the hospital records during this visit      Follow-up Disposition:  Return in about 4 weeks (around 12/18/2017). I asked Suze Edward if he has any questions and I answered the questions. Suze Veras. states that he understands the treatment plan and agrees with the treatment plan      LOS: [id number]

## 2017-11-27 NOTE — PROGRESS NOTES
Transitions of Care Coordination    Ade Victoria was hospitalized at [facility] 11/16-11/18/17 for a TIA, chronic cholecystitis, hx of CVA, vascular dementia and discharged to home with [address] Family declined home care after discharge. The patient was seen at home by Dr. Jf Grey on 11/20/17. Per visit note patient continues to have right upper quadrant abdominal pain and the cholecystotomy drain remains in place. No new medication orders noted. The patient as advised to continue maintenance medications and RTC in about 4 weeks. Call to home number. The phone was answered by a friend. Patient's wife will return home about 1330 and will plan to [facility] after that time. Reached patient's wife, Heather Forrest (HIPPA verified,) and identified self. Wife stated patient Con Forrest had a couple of bad days, is tired and doesn't feel well but is not in pain. \"  Wife also stated patient is constipated and wife has given him lactulose today. Reviewed s/s of sepsis. Wife voiced understanding and has been monitoring patient's VS at home and they are stable. Patient's drain site does not have redness, swelling, discharge per wife. Advised wife to contact the office if patient's constipation is not cleared or if other s/s develop. Opportunity to ask questions was provided. Contact information was given for future questions, concerns or assistance. Will follow. Goals Addressed             Most Recent       Post Hospitalization     Attends follow-up appointments as directed. On track (11/27/2017)             Plan:  Ensure patient has PCp F/U apt.--11/20/17-seen at home by Dr. Nidhi Rendon Prevent complications post hospitalization. On track (11/27/2017)             Plan:  Educate wife on reportable s/s-done 11/19/17 11/27/17-Reveiwed s/s of sepsis.

## 2017-11-29 NOTE — MR AVS SNAPSHOT
Visit Information Date & Time Provider Department Dept. Phone Encounter #  
 11/29/2017  2:15 PM Bea Mora MD [facility] [phone] [id number] Follow-up Instructions Return if symptoms worsen or fail to improve. Upcoming Health Maintenance Date Due DTaP/Tdap/Td series (1 - Tdap) 12/13/1956 Pneumococcal 65+ High/Highest Risk (2 of 2 - PCV13) 9/7/2017 MEDICARE YEARLY EXAM 6/22/2018 GLAUCOMA SCREENING Q2Y 6/20/2019 Allergies as of 11/29/2017  Review Complete On: 11/29/2017 By: Breann Robles No Known Allergies Current Immunizations  Reviewed on 7/24/2017 Name Date Influenza Vaccine (Quad) PF 11/18/2017 10:34 AM, 9/7/2016, 9/14/2015 Pneumococcal Polysaccharide (PPSV-23) 9/7/2016 Zoster Vaccine, Live 11/28/2016 Not reviewed this visit You Were Diagnosed With   
  
 Codes Comments DISH (diffuse idiopathic skeletal hyperostosis)    -  Primary ICD-10-CM: M48.10 ICD-9-CM: 721.6 Spinal stenosis, lumbar region, without neurogenic claudication     ICD-10-CM: M48.061 
ICD-9-CM: 724.02   
 DDD (degenerative disc disease), cervical     ICD-10-CM: M50.30 ICD-9-CM: 722.4 Vitals BP Pulse Temp Resp Weight(growth percentile) BMI  
 131/67 (BP 1 Location: Left arm, BP Patient Position: Sitting) 60 98.1 °F (36.7 °C) (Oral) 16 177 lb 6.4 oz (80.5 kg) 24.74 kg/m2 Smoking Status Never Smoker BMI and BSA Data Body Mass Index Body Surface Area 24.74 kg/m 2 2.01 m 2 Preferred Pharmacy Pharmacy Name Phone [address], [address] [place] [address] [phone] Your Updated Medication List  
  
   
This list is accurate as of: 11/29/17  3:35 PM.  Always use your most recent med list.  
  
  
  
  
 allopurinol 300 mg tablet Commonly known as:  ZYLOPRIM  
TAKE 1 TABLET BY MOUTH ONCE DAILY aspirin delayed-release 81 mg tablet Take 81 mg by mouth two (2) times a day. CENTRUM SILVER PO Take 1 Tab by mouth daily. donepezil 10 mg tablet Commonly known as:  ARICEPT Take 1 Tab by mouth nightly.  
  
 ergocalciferol 50,000 unit capsule Commonly known as:  ERGOCALCIFEROL Take 1 Cap by mouth every seven (7) days. furosemide 20 mg tablet Commonly known as:  LASIX Take  by mouth daily. haloperidol 0.5 mg tablet Commonly known as:  HALDOL  
1 tablet at bedtime  
  
 levoFLOXacin 500 mg tablet Commonly known as:  [place] Payer Take 1 Tab by mouth daily. metoprolol tartrate 50 mg tablet Commonly known as:  LOPRESSOR  
TAKE 1 TABLET BY MOUTH 2 TIMES A DAY  
  
 NITROSTAT 0.4 mg SL tablet Generic drug:  nitroglycerin  
by SubLINGual route every five (5) minutes as needed for Chest Pain. * oxyCODONE ER 20 mg ER tablet Commonly known as:  OxyCONTIN Take 1 Tab by mouth every twelve (12) hours. Max Daily Amount: 40 mg. Indications: Chronic Pain Start taking on:  12/13/2017 * oxyCODONE ER 20 mg ER tablet Commonly known as:  OxyCONTIN Take 1 Tab by mouth every twelve (12) hours. Max Daily Amount: 40 mg. Indications: chronic pain Start taking on:  1/12/2018 * oxyCODONE ER 20 mg ER tablet Commonly known as:  OxyCONTIN Take 1 Tab by mouth every twelve (12) hours. Max Daily Amount: 40 mg. Indications: chronic pain Start taking on:  2/11/2018 * oxyCODONE-acetaminophen  mg per tablet Commonly known as:  PERCOCET Take 1 Tab by mouth every eight (8) hours as needed for Pain ((breakthrough pain)). Max Daily Amount: 3 Tabs. Indications: Pain * oxyCODONE-acetaminophen  mg per tablet Commonly known as:  PERCOCET 10 Take 1 Tab by mouth every eight (8) hours as needed for Pain (breakthrough pain). Max Daily Amount: 3 Tabs. Start taking on:  12/28/2017 * oxyCODONE-acetaminophen  mg per tablet Commonly known as:  PERCOCET 10 Take 1 Tab by mouth every eight (8) hours as needed for Pain (breakthrough pain). Max Daily Amount: 3 Tabs. Start taking on:  1/28/2018 potassium chloride 10 mEq tablet Commonly known as:  KLOR-CON Take 1 Tab by mouth three (3) times daily. ursodiol 300 mg capsule Commonly known as:  ACTIGALL  
1 capsule twice per day * Notice: This list has 6 medication(s) that are the same as other medications prescribed for you. Read the directions carefully, and ask your doctor or other care provider to review them with you. Prescriptions Printed Refills  
 oxyCODONE-acetaminophen (PERCOCET 10)  mg per tablet 0 Starting on: 12/28/2017 Sig: Take 1 Tab by mouth every eight (8) hours as needed for Pain (breakthrough pain). Max Daily Amount: 3 Tabs. Class: Print Route: Oral  
 oxyCODONE-acetaminophen (PERCOCET 10)  mg per tablet 0 Starting on: 1/28/2018 Sig: Take 1 Tab by mouth every eight (8) hours as needed for Pain (breakthrough pain). Max Daily Amount: 3 Tabs. Class: Print Route: Oral  
 oxyCODONE ER (OXYCONTIN) 20 mg ER tablet 0 Starting on: 12/13/2017 Sig: Take 1 Tab by mouth every twelve (12) hours. Max Daily Amount: 40 mg. Indications: Chronic Pain Class: Print Route: Oral  
 oxyCODONE ER (OXYCONTIN) 20 mg ER tablet 0 Starting on: 1/12/2018 Sig: Take 1 Tab by mouth every twelve (12) hours. Max Daily Amount: 40 mg. Indications: chronic pain  
 Class: Print Route: Oral  
 oxyCODONE ER (OXYCONTIN) 20 mg ER tablet 0 Starting on: 2/11/2018 Sig: Take 1 Tab by mouth every twelve (12) hours. Max Daily Amount: 40 mg. Indications: chronic pain  
 Class: Print Route: Oral  
 oxyCODONE-acetaminophen (PERCOCET)  mg per tablet 0 Sig: Take 1 Tab by mouth every eight (8) hours as needed for Pain ((breakthrough pain)). Max Daily Amount: 3 Tabs. Indications: Pain Class: Print Route: Oral  
  
Follow-up Instructions Return if symptoms worsen or fail to improve. To-Do List   
 12/15/2017 11:00 AM  
  Appointment with [facility] CATH LAB HOLDING; [facility] NO ANESTHESIA; [facility] IR VAS SURGEON; [address] at [facility] RAD [address] ([phone]) DIET RESTRICTIONS - Do not eat or drink for 6 hours prior to procedure. - You may take meds with sips of water. For meds to be taken with food, take the night before your procedure. GENERAL INSTRUCTIONS - If you are on a blood thinner, you must contact your doctor to determine if you may discontinue your blood thinner prior to your exam.  Failure to contact your doctor may result in a reschedule of your appointment. - Leave all valuables at home. - Bring an updated list of your medications including over the Counter and herbal therapies. - If you  have a hand-written prescription for this exam, you must bring it with you to your appointment. - You may be responsible for any co-payments and deductibles as indicated by your insurance carrier. - Only patients will be allowed in the exam room, including children. - Children under the age of 15 may not be left unattended. - Bring all relevant prior images from facilities outside of [facility]. Failure to provide images may delay reading by Radiologist. - [facility] patients must have an armband and be accompanied by a caregiver or family member. APPOINTMENT CANCELLATION - To reschedule or cancel an appointment, please contact the Scheduling department at [phone]. Patient Instructions Learning About Safe Use of Long-Acting Opioids Introduction Long-acting opioids relieve moderate to severe pain. They are also called extended-release opioids. Opioids relieve pain by changing the way your body feels pain. They don't cure a health problem. They help you manage the pain.  
If you take a lot of short-acting pain medicine, your doctor may give you long-acting opioids. They help you avoid the ups and downs in pain relief that you may have with short-acting medicine. Opioids are strong medicines. They can help you manage pain when you use them the right way. But if you misuse them, they can cause serious harm and even death. If you decide to take opioids, here are some things to remember. · Keep your doctor informed. You can get addicted to opioids. The risk is higher if you have a history of substance use. Your doctor will monitor you closely for signs of misuse and addiction and to figure out when you no longer need to take opioids. · Make a treatment plan. The goal of your plan is to be able to function and do the things you need to do, even if you still have some pain. You might be able to manage your pain with other non-opioid options like physical therapy, relaxation, or over-the-counter pain medicines. · Be aware of the side effects. Opioids can cause serious side effects, such as constipation, dry mouth, and nausea. And over time, you may need a higher dose to get pain relief. This is called tolerance. Your body also gets used to opioids. This is called physical dependence. If you suddenly stop taking them, you may have withdrawal symptoms. Examples · Fentanyl patch (Duragesic) · Methadone (Dolophine) · Morphine (Carol) · Oxycodone controlled-release (OxyContin) Safety tips If you need to take opioids to manage your pain, remember these safety tips. · Follow directions carefully. It's easy to misuse opioids if you take a dose other than what's prescribed by your doctor. This can lead to overdose and even death. Even sharing them with someone they weren't meant for is misuse. · Be cautious. Opioids may affect your judgment and decision making. Do not drive or operate machinery until you can think clearly. Talk with your doctor about when it is safe to drive. · Reduce the risk of drug interactions.  Opioids can be dangerous if you take them with alcohol or with certain drugs like sleeping pills and muscle relaxers. Make sure your doctor knows about all the other medicines you take, including over-the-counter medicines. Don't start any new medicines before you talk to your doctor or pharmacist. 
· Keep others safe. Store opioids in a safe and secure place. Make sure that pets, children, friends, and family can't get to them. When you're done using opioids, make sure to properly dispose of them. You can either use a community drug take-back program or your drugstore's mail-back program. If one of these programs isn't available, you can flush opioid skin patches and unused opioid pills down the toilet. · Reduce the risk of overdose. Misuse of opioids can be very dangerous. Protect yourself by asking your doctor about a naloxone rescue kit. It can help you-and even save your life-if you take too much of an opioid. Possible side effects All medicines have side effects. But many people don't feel the side effects, or they are able to deal with them. You may: · Feel confused or have a hard time thinking clearly. · Be constipated. · Feel faint, dizzy, or lightheaded. · Feel drowsy. · Feel sick to your stomach or vomit. · Have an allergic reaction. Where can you learn more? Go to http://pablo-isrrael.info/. Enter O105 in the search box to learn more about \"Learning About Safe Use of Long-Acting Opioids. \" Current as of: October 14, 2016 Content Version: 11.4 © 9816-8009 [employer]. Care instructions adapted under license by [employer] (which disclaims liability or warranty for this information). If you have questions about a medical condition or this instruction, always ask your healthcare professional. [address] any warranty or liability for your use of this information. The DO's and DONT's of Extended Release/ Long Acting Opioid Analgesics DO 
 
 - Read the Medication Guide - Take your medication exactly as prescribed -  Store your medication away from children and in a safe locked-up place - Flush unused medicine down the toilet or dispose at a local pharmacy that participates in a take back/disposal program. 
    - Tell your healthcare provider if you are pregnant or plan on becoming pregnant.  
    - Tell your healthcare provider your complete medical history, family history, including any history of substance abuse or mental illness. - Call your healthcare provider for medical advice about side effects. You may report side effects to the FDA at 4-347-FDA-9118 Call 911 or your local Emergency Service Right Away If: 
 
    - You take too much medicine - You have trouble breathing - A child has taken this medicine by accident Talk to Your Healthcare Provider - If the dose you are taking does not control your pain - About any side effects you may be having - About all the medicines you take, including over-the-counter medicines, vitamins, and dietary supplements. DON'T 
 
    - DO NOT give your medicine to others - DO NOT take any medicine that was not prescribed to you - DO NOT stop taking your medicine without talking to your healthcare provider - DO NOT cut, break, chew, crush, dissolve, snort, or inject your medicine. If you cannot swallow your medicine whole, talk to your healthcare provider.  
    - DO NOT drink alcohol or take illegal substances while taking this medicine. Patient Specific Instructions/Information Introducing [facility]! [address] introduces [employer] patient portal. Now you can access parts of your medical record, email your doctor's office, and request medication refills online. 1. In your internet browser, go to [email or web address] 2. Click on the First Time User? Click Here link in the Sign In box. You will see the New Member Sign Up page. 3. Enter your [employer] Access Code exactly as it appears below. You will not need to use this code after youve completed the sign-up process. If you do not sign up before the expiration date, you must request a new code. · [employer] Access Code: [email or web address] Expires: 2/14/2018 11:09 AM 
 
4. Enter the last four digits of your Social Security Number (xxxx) and Date of Birth (mm/dd/yyyy) as indicated and click Submit. You will be taken to the next sign-up page. 5. Create a [employer] ID. This will be your [employer] login ID and cannot be changed, so think of one that is secure and easy to remember. 6. Create a [employer] password. You can change your password at any time. 7. Enter your Password Reset Question and Answer. This can be used at a later time if you forget your password. 8. Enter your e-mail address. You will receive e-mail notification when new information is available in [address] 9. Click Sign Up. You can now view and download portions of your medical record. 10. Click the Download Summary menu link to download a portable copy of your medical information. If you have questions, please visit the Frequently Asked Questions section of the [employer] website. Remember, [employer] is NOT to be used for urgent needs. For medical emergencies, dial 911. Now available from your iPhone and Android! Please provide this summary of care documentation to your next provider. Your primary care clinician is listed as Darrius Lack. If you have any questions after today's visit, please call [phone].

## 2017-11-29 NOTE — PROGRESS NOTES
[address]  [address], [address]  [place], [address] [place] [address] Street  Phone: [phone]  Fax: [phone]        Arelis Durán  : 1935  PCP: Ish Benitez MD    PROGRESS NOTE      ASSESSMENT AND PLAN    Diagnoses and all orders for this visit:    1. DISH (diffuse idiopathic skeletal hyperostosis)    2. Spinal stenosis, lumbar region, without neurogenic claudication    3. DDD (degenerative disc disease), cervical    Other orders  -     oxyCODONE-acetaminophen (PERCOCET 10)  mg per tablet; Take 1 Tab by mouth every eight (8) hours as needed for Pain (breakthrough pain). Max Daily Amount: 3 Tabs. -     oxyCODONE-acetaminophen (PERCOCET 10)  mg per tablet; Take 1 Tab by mouth every eight (8) hours as needed for Pain (breakthrough pain). Max Daily Amount: 3 Tabs. -     oxyCODONE ER (OXYCONTIN) 20 mg ER tablet; Take 1 Tab by mouth every twelve (12) hours. Max Daily Amount: 40 mg. Indications: Chronic Pain  -     oxyCODONE ER (OXYCONTIN) 20 mg ER tablet; Take 1 Tab by mouth every twelve (12) hours. Max Daily Amount: 40 mg. Indications: chronic pain  -     oxyCODONE ER (OXYCONTIN) 20 mg ER tablet; Take 1 Tab by mouth every twelve (12) hours. Max Daily Amount: 40 mg. Indications: chronic pain  -     oxyCODONE-acetaminophen (PERCOCET)  mg per tablet; Take 1 Tab by mouth every eight (8) hours as needed for Pain ((breakthrough pain)). Max Daily Amount: 3 Tabs. Indications: Pain    1. Advised to continue HEP. 2. Safe use of opioids discussed with pt.    3. Referral to pain management Dr. Marian Fatima. Compliant long-term patient, MME  exceeds practice RX new policy  4. Continue Percocet and Oxycontin, pain not well controlled, may need opioid rotation w/caution given cognitive issues. Follow-up Disposition:  Return if symptoms worsen or fail to improve. Risks and benefits of ongoing opiate therapy have been reviewed with the patient. Per review of available records and patients , there are not signs of overuse, misuse, diversion, or concerning side effects. UDS results have been consistent. Pt has a good risk to benefit ratio which allows the pt to function in a home environment without side effects. HISTORY OF PRESENT ILLNESS  Suze Smith. is a 80 y.o. male. Pt presents to the office for a f/u visit for chronic back pain and medication management. Pt has not been doing well since last OV. Has had recent hospitalization (11/16/17-11/18/17) due to dehydration and TIA last week. Pt reports pain does not radiate into BLE. Pt denies saddle paresthesias. Pt states he has been using Percocet  mg BID-TID for BTP and Oxycontin 20 mg ER q12 with  relief. Has constipation that he takes Miralax for. Pt denies any dizziness, confusion, and cravings due to controlled substances. He has been having GI issues causing decreased appetite, has biliary drain. Denies persistent fevers, chills, weight changes, neurogenic bowel or bladder symptoms. Pain effects sleep, work, standing, play and recreational activities, and his ability to perform ADLs. He has tried; PT-Yes,  Non-opioid medications Yes, and spinal injections Yes. Opioid Assessment    Opioid Risk Tool Reviewed: YES, Score = 0  Aberrant behaviors: None. Urine Drug Screen: reviewed and up to date. UDS from last visit was consistent with medication use. Controlled substance agreement on file: YES.  reviewed:yes  Concomitant use of a benzodiazepine: no  MME is 105    Pain is unbearable without medications. He is unable to function without pain medications.      Pain Assessment  11/29/2017   Location of Pain Back   Location Modifiers -   Severity of Pain 8   Quality of Pain Aching   Quality of Pain Comment -   Duration of Pain -   Frequency of Pain Constant   Date Pain First Started -   Aggravating Factors Walking;Standing;Stairs;Squatting;Kneeling;Exercise;Straightening;Stretching;Bending   Aggravating Factors Comment -   Limiting Behavior -   Relieving Factors (No Data)   Relieving Factors Comment pain meds help   Result of Injury -       PAST MEDICAL HISTORY   Past Medical History:   Diagnosis Date    3-vessel coronary artery disease 02/13/2004    Tech limited. Low normal to mildly depressed LV systolic function. LVH. DDfx. LAE. PASP 25-30. 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 mLAD 100%. CX diffuse nonobstructive. OM1 patent stent. pRCA 100%. Dbl seq SVG to D1, OM1 100%. SVG to pRPDA 99% (3.5 x 23-mm Xience stent). Patent mid segment stent. LIMA to LAD ok.  Cardiac echocardiogram 11/16/2014    [facility]:  EF 55-60%. DDfx. Normal RVSP. Mild TR. Neg bubble study for atrial shunt. No significant valvular pathology.  Cardiac nuclear imaging test, high risk 06/15/2012    Lg area of mod mid to distal anterior ischemia. Lg area of inferior ischemia. EF 57%. Mild mid-distal anterior, inferior hypk.   High risk pharm stress test.    Cervical spine pain     Deviated septum     s/p corrective surgery 7/1/2010    Disc disease, degenerative, cervical     joint pain, back pain    Dysphagia, unspecified(787.20)     Essential hypertension, benign     Gout with other specified manifestations(274.89)     Head injury     frequent headaches    Hypercholesterolemia     Insomnia, unspecified     Lumbar canal stenosis     Meniere's disease     Myocardial infarction, anterior wall (HCC)     DIEGO (obstructive sleep apnea)     on CPAP    S/P diskectomy 7/5/07    cervical    Spinal stenosis, lumbar region, without neurogenic claudication     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     Unspecified sinusitis (chronic)     Ventral hernia, unspecified, without mention of obstruction or gangrene     S/p repair 6/2003       Past Surgical History:   Procedure Laterality Date    CARDIAC CATHETERIZATION  3/15/2017         CARDIAC SURG PROCEDURE UNLIST  1/04    angioplasty of a proximal left anterior descending    CARDIAC SURG PROCEDURE UNLIST  6/2/08    angioplasty and stenting of totally obstructed saphenous vein graft to the posterior descending artery    CORONARY ARTERY ANGIOGRAM  3/15/2017         HX APPENDECTOMY      HX CATARACT REMOVAL      bilateral    HX CERVICAL DISKECTOMY  7/07    and fusion    HX CORONARY ARTERY BYPASS GRAFT  4/14/94    X4, with LIMA to the LAD, double sequential to the diagonal and circumflex and single to the main right coronary artery    HX CORONARY STENT PLACEMENT  6/20/2012    HX GI  1992    two feet of colon removed    HX HEART CATHETERIZATION  6/02/08    HX HEART CATHETERIZATION  6/20/2012    HX HEENT  7/1/10    deviated nasal septum surgery    HX HERNIA REPAIR  7/2003    two prior hernia repairs in the past    HX OTHER SURGICAL      HX PTCA  6/20/2012    HX TONSILLECTOMY      as a child    [facility] [address] [facility]   . MEDICATIONS    Current Outpatient Prescriptions   Medication Sig Dispense Refill    potassium chloride (KLOR-CON) 10 mEq tablet Take 1 Tab by mouth three (3) times daily. 90 Tab 1    ergocalciferol (ERGOCALCIFEROL) 50,000 unit capsule Take 1 Cap by mouth every seven (7) days. 4 Cap 3    levoFLOXacin (LEVAQUIN) 500 mg tablet Take 1 Tab by mouth daily. 5 Tab 0    nitroglycerin (NITROSTAT) 0.4 mg SL tablet by SubLINGual route every five (5) minutes as needed for Chest Pain.  furosemide (LASIX) 20 mg tablet Take  by mouth daily.       ursodiol (ACTIGALL) 300 mg capsule 1 capsule twice per day 60 Cap 3    metoprolol tartrate (LOPRESSOR) 50 mg tablet TAKE 1 TABLET BY MOUTH 2 TIMES A DAY (Patient taking differently: TAKE 1 TABLET BY MOUTH 2 TIMES A DAY, reports 25mg BID) 60 Tab 11    donepezil (ARICEPT) 10 mg tablet Take 1 Tab by mouth nightly. 30 Tab 0    haloperidol (HALDOL) 0.5 mg tablet 1 tablet at bedtime 30 Tab 2    oxyCODONE ER (OXYCONTIN) 20 mg ER tablet Take 1 Tab by mouth every twelve (12) hours. Max Daily Amount: 40 mg. Indications: Chronic Pain 60 Tab 0    oxyCODONE-acetaminophen (PERCOCET)  mg per tablet Take 1 Tab by mouth every eight (8) hours as needed for Pain ((breakthrough pain)). Max Daily Amount: 3 Tabs. Indications: Pain 90 Tab 0    allopurinol (ZYLOPRIM) 300 mg tablet TAKE 1 TABLET BY MOUTH ONCE DAILY 90 Tab 3    aspirin delayed-release 81 mg tablet Take 81 mg by mouth two (2) times a day.  MULTIVITAMINS W-MINERALS/LUT (CENTRUM SILVER PO) Take 1 Tab by mouth daily. ALLERGIES  No Known Allergies       SOCIAL HISTORY    Social History     Social History    Marital status:      Spouse name: N/A    Number of children: N/A    Years of education: N/A     Occupational History    Not on file. Social History Main Topics    Smoking status: Never Smoker    Smokeless tobacco: Never Used    Alcohol use Yes    Drug use: No    Sexual activity: No     Other Topics Concern    Not on file     Social History Narrative       FAMILY HISTORY  Family History   Problem Relation Age of Onset    Heart Disease Father     Stroke Father        REVIEW OF SYSTEMS  Review of Systems   Constitutional: Positive for malaise/fatigue and weight loss. Negative for chills and fever. Respiratory: Negative for shortness of breath. Cardiovascular: Negative for chest pain. Gastrointestinal: Positive for abdominal pain and nausea. Negative for constipation. Negative for fecal incontinence   Genitourinary: Negative for dysuria. Negative for urinary incontinence   Musculoskeletal: Positive for back pain, myalgias and neck pain.         Per HPI   Skin: Negative for rash. Neurological: Positive for weakness. Negative for dizziness, tingling, tremors, focal weakness and headaches. Endo/Heme/Allergies: Does not bruise/bleed easily. Psychiatric/Behavioral: Positive for memory loss. The patient does not have insomnia. PHYSICAL EXAMINATION  Visit Vitals    /67 (BP 1 Location: Left arm, BP Patient Position: Sitting)    Pulse 60    Temp 98.1 °F (36.7 °C) (Oral)    Resp 16    Wt 177 lb 6.4 oz (80.5 kg)    BMI 24.74 kg/m2         Accompanied by spouse. Constitutional:  Thin pale  Psychiatric: Blunted affect, depressed mood  Integumentary: No rashes or abrasions noted on exposed areas. Cardiovascular/Peripheral Vascular: Intact l pulses. No peripheral edema is noted. Lymphatic:  No evidence of lymphedema. No cervical lymphadenopathy. SPINE/MUSCULOSKELETAL EXAM    Thoracic spine:  Tenderness to palpation of L thoracic paraspinals. Lumbar spine:  No rash, ecchymosis, or gross obliquity. No fasciculations. No focal atrophy is noted. Tenderness to palpation of lumbar paraspinals. No tenderness to palpation at the sciatic notch. SI joints non-tender. Trochanters non tender. Sensation grossly intact to light touch. MOTOR:       Hip Flex  Quads Hamstrings Ankle DF EHL Ankle PF   Right +4/5 +4/5 +4/5 +4/5 +4/5 +4/5   Left +4/5 +4/5 +4/5 +4/5 +4/5 +4/5       Straight Leg raise negative. Ambulation without assistive device. FWB. Written by [facility], as dictated by Ruben Villanueva MD.    I, Dr. Ruben Villanueva MD, confirm that all documentation is accurate. Mr. Vitaliy Salazar may have a reminder for a \"due or due soon\" health maintenance. I have asked that he contact his primary care provider for follow-up on this health maintenance.

## 2017-11-29 NOTE — PATIENT INSTRUCTIONS
Learning About Safe Use of Long-Acting Opioids  Introduction    Long-acting opioids relieve moderate to severe pain. They are also called extended-release opioids. Opioids relieve pain by changing the way your body feels pain. They don't cure a health problem. They help you manage the pain. If you take a lot of short-acting pain medicine, your doctor may give you long-acting opioids. They help you avoid the ups and downs in pain relief that you may have with short-acting medicine. Opioids are strong medicines. They can help you manage pain when you use them the right way. But if you misuse them, they can cause serious harm and even death. If you decide to take opioids, here are some things to remember. · Keep your doctor informed. You can get addicted to opioids. The risk is higher if you have a history of substance use. Your doctor will monitor you closely for signs of misuse and addiction and to figure out when you no longer need to take opioids. · Make a treatment plan. The goal of your plan is to be able to function and do the things you need to do, even if you still have some pain. You might be able to manage your pain with other non-opioid options like physical therapy, relaxation, or over-the-counter pain medicines. · Be aware of the side effects. Opioids can cause serious side effects, such as constipation, dry mouth, and nausea. And over time, you may need a higher dose to get pain relief. This is called tolerance. Your body also gets used to opioids. This is called physical dependence. If you suddenly stop taking them, you may have withdrawal symptoms. Examples  · Fentanyl patch (Duragesic)  · Methadone (Dolophine)  · Morphine (Carol)  · Oxycodone controlled-release (OxyContin)  Safety tips  If you need to take opioids to manage your pain, remember these safety tips. · Follow directions carefully. It's easy to misuse opioids if you take a dose other than what's prescribed by your doctor.  This can lead to overdose and even death. Even sharing them with someone they weren't meant for is misuse. · Be cautious. Opioids may affect your judgment and decision making. Do not drive or operate machinery until you can think clearly. Talk with your doctor about when it is safe to drive. · Reduce the risk of drug interactions. Opioids can be dangerous if you take them with alcohol or with certain drugs like sleeping pills and muscle relaxers. Make sure your doctor knows about all the other medicines you take, including over-the-counter medicines. Don't start any new medicines before you talk to your doctor or pharmacist.  · Keep others safe. Store opioids in a safe and secure place. Make sure that pets, children, friends, and family can't get to them. When you're done using opioids, make sure to properly dispose of them. You can either use a community drug take-back program or your drugstore's mail-back program. If one of these programs isn't available, you can flush opioid skin patches and unused opioid pills down the toilet. · Reduce the risk of overdose. Misuse of opioids can be very dangerous. Protect yourself by asking your doctor about a naloxone rescue kit. It can help you-and even save your life-if you take too much of an opioid. Possible side effects  All medicines have side effects. But many people don't feel the side effects, or they are able to deal with them. You may:  · Feel confused or have a hard time thinking clearly. · Be constipated. · Feel faint, dizzy, or lightheaded. · Feel drowsy. · Feel sick to your stomach or vomit. · Have an allergic reaction. Where can you learn more? Go to http://pablo-isrrael.info/. Enter O105 in the search box to learn more about \"Learning About Safe Use of Long-Acting Opioids. \"  Current as of: October 14, 2016  Content Version: 11.4  © 3965-7983 [employer].  Care instructions adapted under license by [employer] (which disclaims liability or warranty for this information). If you have questions about a medical condition or this instruction, always ask your healthcare professional. [place] [address] any warranty or liability for your use of this information. The DO's and DONT's of Extended Release/ Long Acting Opioid Analgesics    DO        - Read the Medication Guide      - Take your medication exactly as prescribed      -  Store your medication away from children and in a safe locked-up place      - Flush unused medicine down the toilet or dispose at a local pharmacy that participates in a take back/disposal program.      - Tell your healthcare provider if you are pregnant or plan on becoming pregnant.       - Tell your healthcare provider your complete medical history, family history, including any history of substance abuse or mental illness. - Call your healthcare provider for medical advice about side effects. You may report side effects to the FDA at 1-410-FDA-7527    Call 911 or your local Emergency Service Right Away If:        - You take too much medicine      - You have trouble breathing      - A child has taken this medicine by accident        Talk to [facility] Provider        - If the dose you are taking does not control your pain      - About any side effects you may be having      - About all the medicines you take, including over-the-counter medicines, vitamins, and dietary supplements. DON'T        - DO NOT give your medicine to others      - DO NOT take any medicine that was not prescribed to you      - DO NOT stop taking your medicine without talking to your healthcare provider      - DO NOT cut, break, chew, crush, dissolve, snort, or inject your medicine. If you cannot swallow your medicine whole, talk to your healthcare provider.       - DO NOT drink alcohol or take illegal substances while taking this medicine.      Patient Specific Instructions/Information

## 2017-12-05 NOTE — PROGRESS NOTES
Transitions of Care Coordination    Urban García was hospitalized at [address] [place] [address] 11/16-11/18/17 for a TIA, chronic cholecystitis, hx of CVA, vascular dementia and discharge to home. Family declined home care. Received return call from wife in response to message left. Per wife patient is doing well at this time. Mr. Victor Hugo Mercedes attended an appointment with orthopedics on 11/29/17 and pain medications were refilled. Wife is aware of cholangiogram at [address] [place] [address] scheduled for 12/15/17 and stated she was asked by Dr. Santi Shetty to call to set up an appointment after the procedure. Wife manages medications and confirmed patient is taking as directed. Voiced no questions or concerns. Will follow. Goals Addressed             Most Recent       Post Hospitalization     Attends follow-up appointments as directed. On track (12/5/2017)             Plan:  Ensure patient has PCp F/U apt.--11/20/17-seen at home by Dr. Santi Shetty  11/29/17-Attended apt with orthopedics-pain medications refilled         Prevent complications post hospitalization. On track (12/5/2017)             Plan:  Educate wife on reportable s/s-done 11/19/17 11/27/17-Reveiwed s/s of sepsis.

## 2017-12-19 NOTE — PROGRESS NOTES
Transitions of Care Coordination    Danilo Tim was hospitalized at [facility] 11/16-11/18/17 for a TIA, chronic cholecystitis, hx of CVA, vascular dementia and discharged to home with [facility] . Family declined home care after discharge. Patient has a cholecystotomy tube in place and wife is independent with management. Mr. Pao Rhoades had a scheduled procedure to exchange the cholecystotomy tube at [facility] on 12/15/17. Next appointment is with Dr. Love Farooq on 12/29/17. Goals met. Episode resolved:  No ED visit or hospitalization 30 days from discharge on 11/18/17. Goals Addressed             Most Recent       Post Hospitalization     Attends follow-up appointments as directed. On track (12/19/2017)             Plan:  Ensure patient has PCp F/U apt.--11/20/17-seen at home by Dr. Love Farooq  11/29/17-Attended apt with orthopedics-pain medications refilled         Prevent complications post hospitalization. On track (12/19/2017)             Plan:  Educate wife on reportable s/s-done 11/19/17 11/27/17-Reveiwed s/s of sepsis.

## 2017-12-29 PROBLEM — R65.20 SEVERE SEPSIS (HCC): Status: RESOLVED | Noted: 2017-01-01 | Resolved: 2017-01-01

## 2017-12-29 PROBLEM — A41.9 SEVERE SEPSIS (HCC): Status: RESOLVED | Noted: 2017-01-01 | Resolved: 2017-01-01

## 2017-12-29 NOTE — PROGRESS NOTES
The patient presents to the office today with the chief complaint of chronic cholecystitis    HPI    The patient continues with external drainage due to gallstones with chronic cholecystitis. The patient denies abdominal pain or nausea. His weight is ok. The patient denies fever. 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 Cardiovascular: Negative for chest pain and leg swelling. No Known Allergies    Current Outpatient Prescriptions   Medication Sig Dispense Refill    collagenase (SANTYL) 250 unit/gram ointment Apply twice per day 30 g 0    haloperidol (HALDOL) 0.5 mg tablet TAKE 1 TABLET BY MOUTH EVERY NIGHT AT BEDTIME 30 Tab 2    oxyCODONE-acetaminophen (PERCOCET 10)  mg per tablet Take 1 Tab by mouth every eight (8) hours as needed for Pain (breakthrough pain). Max Daily Amount: 3 Tabs. 90 Tab 0    oxyCODONE ER (OXYCONTIN) 20 mg ER tablet Take 1 Tab by mouth every twelve (12) hours. Max Daily Amount: 40 mg. Indications: Chronic Pain 60 Tab 0    potassium chloride (KLOR-CON) 10 mEq tablet Take 1 Tab by mouth three (3) times daily. 90 Tab 1    nitroglycerin (NITROSTAT) 0.4 mg SL tablet by SubLINGual route every five (5) minutes as needed for Chest Pain.  furosemide (LASIX) 20 mg tablet Take  by mouth daily.  ursodiol (ACTIGALL) 300 mg capsule 1 capsule twice per day 60 Cap 3    metoprolol tartrate (LOPRESSOR) 50 mg tablet TAKE 1 TABLET BY MOUTH 2 TIMES A DAY (Patient taking differently: TAKE 1 TABLET BY MOUTH 2 TIMES A DAY, reports 25mg BID) 60 Tab 11    donepezil (ARICEPT) 10 mg tablet Take 1 Tab by mouth nightly. 30 Tab 0    allopurinol (ZYLOPRIM) 300 mg tablet TAKE 1 TABLET BY MOUTH ONCE DAILY 90 Tab 3    aspirin delayed-release 81 mg tablet Take 81 mg by mouth two (2) times a day.  MULTIVITAMINS W-MINERALS/LUT (CENTRUM SILVER PO) Take 1 Tab by mouth daily. Past Medical History:   Diagnosis Date    3-vessel coronary artery disease 02/13/2004    Tech limited. Low normal to mildly depressed LV systolic function. LVH. DDfx. LAE. PASP 25-30. 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 mLAD 100%. CX diffuse nonobstructive. OM1 patent stent. pRCA 100%. Dbl seq SVG to D1, OM1 100%. SVG to pRPDA 99% (3.5 x 23-mm Xience stent). Patent mid segment stent. LIMA to LAD ok.  Cardiac echocardiogram 11/16/2014    [facility]:  EF 55-60%. DDfx. Normal RVSP. Mild TR. Neg bubble study for atrial shunt. No significant valvular pathology.  Cardiac nuclear imaging test, high risk 06/15/2012    Lg area of mod mid to distal anterior ischemia. Lg area of inferior ischemia. EF 57%. Mild mid-distal anterior, inferior hypk.   High risk pharm stress test.    Cervical spine pain     Deviated septum     s/p corrective surgery 7/1/2010    Disc disease, degenerative, cervical     joint pain, back pain    Dysphagia, unspecified(787.20)     Essential hypertension, benign     Gout with other specified manifestations(274.89)     Head injury     frequent headaches    Hypercholesterolemia     Insomnia, unspecified     Lumbar canal stenosis     Meniere's disease     Myocardial infarction, anterior wall (HCC)     DIEGO (obstructive sleep apnea)     on CPAP    S/P diskectomy 7/5/07    cervical    Spinal stenosis, lumbar region, without neurogenic claudication     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     Unspecified sinusitis (chronic)     Ventral hernia, unspecified, without mention of obstruction or gangrene     S/p repair 6/2003       Past Surgical History:   Procedure Laterality Date    CARDIAC CATHETERIZATION  3/15/2017         CARDIAC SURG PROCEDURE UNLIST  1/04    angioplasty of a proximal left anterior descending    CARDIAC SURG PROCEDURE UNLIST  6/2/08    angioplasty and stenting of totally obstructed saphenous vein graft to the posterior descending artery    CORONARY ARTERY ANGIOGRAM  3/15/2017         HX APPENDECTOMY      HX CATARACT REMOVAL      bilateral    HX CERVICAL DISKECTOMY  7/07    and fusion    HX CORONARY ARTERY BYPASS GRAFT  4/14/94    X4, with LIMA to the LAD, double sequential to the diagonal and circumflex and single to the main right coronary artery    HX CORONARY STENT PLACEMENT  6/20/2012    HX GI  1992    two feet of colon removed    HX HEART CATHETERIZATION  6/02/08    HX HEART CATHETERIZATION  6/20/2012    HX HEENT  7/1/10    deviated nasal septum surgery    HX HERNIA REPAIR  7/2003    two prior hernia repairs in the past    HX OTHER SURGICAL      HX PTCA  6/20/2012    HX TONSILLECTOMY      as a child    [place] [address] [facility]      for crushed disk       Social History     Social History    Marital status:      Spouse name: N/A    Number of children: N/A    Years of education: N/A     Occupational History    Not on file. Social History Main Topics    Smoking status: Never Smoker    Smokeless tobacco: Never Used    Alcohol use Yes    Drug use: No    Sexual activity: No     Other Topics Concern    Not on file     Social History Narrative       Patient does not have an advanced directive on file    Visit Vitals    /62 (BP 1 Location: Left arm, BP Patient Position: Sitting)    Pulse (!) 57    Temp 96.9 °F (36.1 °C) (Tympanic)    Resp 16    Ht 5' 11\" (1.803 m)    Wt 177 lb (80.3 kg)    SpO2 98%    BMI 24.69 kg/m2       Physical Exam   No Cervical Lymphadenopathy  No Supraclavicular Lymphadenopathy  Thyroid is Normal  Lungs are normal to percussion. Clear to auscultation   Heart:  S1 S2 are normal, No gallops, No mummers  No Carotid Bruits  Abdomen:  Normal Bowel Sounds.   No tenderness. No masses. No Hepatomegaly or Splenomegly. External gallbladder drain is prsent  LE:  Strong Pedal Pulses. No Edema  Limited flexion of the back    BMI:  OK    Admission on 12/19/2017, Discharged on 12/19/2017   Component Date Value Ref Range Status    Prothrombin time 12/19/2017 13.1  0.0 - 14.0 seconds Final    INR 12/19/2017 1.1  0.0 - 1.1   Final    Comment: PATIENTS RECEIVING THE ANTIBIOTIC CUBICIN (DAPTOMYCIN FOR INJECTION) SHOULD BE TESTED FOR PT/INR  BY AN ALTERNATIVE METHOD. CUBICIN HAS BEEN SHOWN TO CAUSE FALSE ELEVATION OF THE INR USING  i-STAT PT/INR CARTRIDGES.      Admission on 11/16/2017, Discharged on 11/18/2017   Component Date Value Ref Range Status    Ventricular Rate 11/16/2017 87  BPM Final    Atrial Rate 11/16/2017 87  BPM Final    P-R Interval 11/16/2017 192  ms Final    QRS Duration 11/16/2017 144  ms Final    Q-T Interval 11/16/2017 422  ms Final    QTC Calculation (Bezet) 11/16/2017 507  ms Final    Calculated P Axis 11/16/2017 50  degrees Final    Calculated R Axis 11/16/2017 -13  degrees Final    Calculated T Axis 11/16/2017 66  degrees Final    Diagnosis 11/16/2017    Final                    Value:Normal sinus rhythm  Right bundle branch block  Abnormal ECG  When compared with ECG of 16-AUG-2017 06:52,  No significant change was found  Confirmed by Jason Chacon M.D., Aixa Rush ([id number]) on 11/16/2017 12:23:25 PM      WBC 11/16/2017 15.5* 4.0 - 11.0 1000/mm3 Final    RBC 11/16/2017 4.60  3.80 - 5.70 M/uL Final    HGB 11/16/2017 14.0  12.4 - 17.2 gm/dl Final    HCT 11/16/2017 40.7  37.0 - 50.0 % Final    MCV 11/16/2017 88.5  80.0 - 98.0 fL Final    MCH 11/16/2017 30.4  23.0 - 34.6 pg Final    MCHC 11/16/2017 34.4  30.0 - 36.0 gm/dl Final    PLATELET 35/91/5949 983  140 - 450 1000/mm3 Final    MPV 11/16/2017 10.8* 6.0 - 10.0 fL Final    RDW-SD 11/16/2017 43.6  35.1 - 43.9   Final    NRBC 11/16/2017 0  0 - 0   Final    IMMATURE GRANULOCYTES 11/16/2017 0.7  0.0 - 3.0 % Final    Comment: IG - Immature granulocytes (promyelocytes + myelocytes + metamyelocytes), their presence  indicates a left shift. An IG > 3% may predict positive blood cultures with 98% specificity.  (P<0.04) and 92% Positive Predictive Value (Meghan)1. Increased immature granulocytes  assist with the detection of infection and/or inflammation and may be present at an early stage  and are more sensitive and specific than band counts.  NEUTROPHILS 11/16/2017 88.5* 34 - 64 % Final    LYMPHOCYTES 11/16/2017 4.5* 28 - 48 % Final    MONOCYTES 11/16/2017 5.7  1 - 13 % Final    EOSINOPHILS 11/16/2017 0.1  0 - 5 % Final    BASOPHILS 11/16/2017 0.5  0 - 3 % Final    Sodium 11/16/2017 139  136 - 145 mEq/L Final    Potassium 11/16/2017 3.5  3.5 - 5.1 mEq/L Final    Chloride 11/16/2017 100  98 - 107 mEq/L Final    CO2 11/16/2017 29  21 - 32 mEq/L Final    Glucose 11/16/2017 125* 74 - 106 mg/dl Final    BUN 11/16/2017 24  7 - 25 mg/dl Final    Creatinine 11/16/2017 0.7  0.6 - 1.3 mg/dl Final    GFR est AA 11/16/2017 >60    Final    Comment: THE [facility] [employer] STATES THAT THE MDRD STUDY EQUATION SHOULD ONLY BE USED ON  INDIVIDUALS 18 OR OLDER. THE REPORT ALSO NOTES THAT THE MDRD STUDY EQUATION HAS NOT BEEN  VALIDATED FOR USE WITH THE ELDERLY (OVER 79YEARS OF AGE), PREGNANT WOMEN, PATIENTS WITH SERIOUS  COMORBID CONDITIONS, OR PERSONS WITH EXTREMES OF BODY SIZE, MUSCLE MASS, OR NUTRITIONAL STATUS. APPLICATION OF THE EQUATION TO THESE PATIENT GROUPS MAY LEAD TO ERRORS IN GFR ESTIMATION. GFR  ESTIMATING EQUATIONS HAVE POORER AGREEMENT WITH MEASURED GFR FOR ILL HOSPITALIZED PATIENTS AND  FOR PEOPLE WITH NEAR NORMAL KIDNEY FUNCTION THAN FOR SUBJECTS IN THE MDRD STUDY. VALIDATION  STUDIES ARE IN PROGRESS TO EVALUATE THE MDRD STUDY EQUATION FOR ADDITIONAL ETHNIC GROUPS, THE  ELDERLY, VARIOUS DISEASE CONDITIONS, AND PEOPLE WITH NORMAL KIDNEY FUNCTION.   GFRA----REFERS TO  [place]  GFRO---REFERS TO OTHER RACES  REFERENCES AVAILABLE UPON REQUEST.      GFR est non-AA 11/16/2017 >60    Final    Calcium 11/16/2017 9.0  8.5 - 10.1 mg/dl Final    AST (SGOT) 11/16/2017 21  15 - 37 U/L Final    ALT (SGPT) 11/16/2017 54  12 - 78 U/L Final    Alk.  phosphatase 11/16/2017 130* 45 - 117 U/L Final    Bilirubin, total 11/16/2017 1.0  0.2 - 1.0 mg/dl Final    Protein, total 11/16/2017 7.1  6.4 - 8.2 gm/dl Final    Albumin 11/16/2017 3.7  3.4 - 5.0 gm/dl Final    Blood Culture Result 11/16/2017 No Growth at 5 days    Final    Blood Culture Result 11/16/2017 No Growth at 5 days    Final    Lactic Acid 11/16/2017 1.4  0.4 - 2.0 mmol/L Final    Glucose 11/16/2017 Negative  NEGATIVE,Negative mg/dl Final    Bilirubin 11/16/2017 Negative  NEGATIVE,Negative   Final    Ketone 11/16/2017 Trace* NEGATIVE,Negative mg/dl Final    Specific gravity 11/16/2017 <=1.005  1.005 - 1.030   Final    Blood 11/16/2017 Moderate* NEGATIVE,Negative   Final    pH (UA) 11/16/2017 5.0  5 - 9   Final    Protein 11/16/2017 Negative  NEGATIVE,Negative mg/dl Final    Urobilinogen 11/16/2017 0.2  0.0 - 1.0 EU/dl Final    Nitrites 11/16/2017 Negative  NEGATIVE,Negative   Final    Leukocyte Esterase 11/16/2017 Negative  NEGATIVE,Negative   Final    Color 11/16/2017 Yellow    Final    Appearance 11/16/2017 Clear    Final    : [id number]    Hemoglobin A1c 11/16/2017 6.1* 4.8 - 6.0 % Final    Glucose (POC) 11/16/2017 112* 65 - 105 mg/dL Final    Comment: Notified Nurse  : [id number]      Glucose (POC) 11/16/2017 134* 65 - 105 mg/dL Final    Comment: Notified Nurse  : [id number]      WBC 11/17/2017 9.7  4.0 - 11.0 1000/mm3 Final    RBC 11/17/2017 3.94  3.80 - 5.70 M/uL Final    HGB 11/17/2017 11.8* 12.4 - 17.2 gm/dl Final    HCT 11/17/2017 35.7* 37.0 - 50.0 % Final    MCV 11/17/2017 90.6  80.0 - 98.0 fL Final    MCH 11/17/2017 29.9  23.0 - 34.6 pg Final    MCHC 11/17/2017 33.1  30.0 - 36.0 gm/dl Final    PLATELET 74/27/8767 502  140 - 450 1000/mm3 Final    MPV 11/17/2017 11.4* 6.0 - 10.0 fL Final    RDW-SD 11/17/2017 44.8* 35.1 - 43.9   Final    NRBC 11/17/2017 0  0 - 0   Final    IMMATURE GRANULOCYTES 11/17/2017 0.5  0.0 - 3.0 % Final    Comment: IG - Immature granulocytes (promyelocytes + myelocytes + metamyelocytes), their presence  indicates a left shift. An IG > 3% may predict positive blood cultures with 98% specificity.  (P<0.04) and 92% Positive Predictive Value (Kelsey-Jacquie)1. Increased immature granulocytes  assist with the detection of infection and/or inflammation and may be present at an early stage  and are more sensitive and specific than band counts.  NEUTROPHILS 11/17/2017 77.9* 34 - 64 % Final    LYMPHOCYTES 11/17/2017 15.4* 28 - 48 % Final    MONOCYTES 11/17/2017 5.7  1 - 13 % Final    EOSINOPHILS 11/17/2017 0.4  0 - 5 % Final    BASOPHILS 11/17/2017 0.1  0 - 3 % Final    Sodium 11/17/2017 142  136 - 145 mEq/L Final    Potassium 11/17/2017 3.5  3.5 - 5.1 mEq/L Final    Chloride 11/17/2017 107  98 - 107 mEq/L Final    CO2 11/17/2017 28  21 - 32 mEq/L Final    Glucose 11/17/2017 107* 74 - 106 mg/dl Final    BUN 11/17/2017 17  7 - 25 mg/dl Final    Creatinine 11/17/2017 0.6  0.6 - 1.3 mg/dl Final    GFR est AA 11/17/2017 >60    Final    Comment: THE [facility] [employer] [facility] STATES THAT THE MDRD STUDY EQUATION SHOULD ONLY BE USED ON  INDIVIDUALS 18 OR OLDER. THE REPORT ALSO NOTES THAT THE MDRD STUDY EQUATION HAS NOT BEEN  VALIDATED FOR USE WITH THE ELDERLY (OVER 79YEARS OF AGE), PREGNANT WOMEN, PATIENTS WITH SERIOUS  COMORBID CONDITIONS, OR PERSONS WITH EXTREMES OF BODY SIZE, MUSCLE MASS, OR NUTRITIONAL STATUS. APPLICATION OF THE EQUATION TO THESE PATIENT GROUPS MAY LEAD TO ERRORS IN GFR ESTIMATION.  GFR  ESTIMATING EQUATIONS HAVE POORER AGREEMENT WITH MEASURED GFR FOR ILL HOSPITALIZED PATIENTS AND  FOR PEOPLE WITH NEAR NORMAL KIDNEY FUNCTION THAN FOR SUBJECTS IN THE MDRD STUDY. VALIDATION  STUDIES ARE IN PROGRESS TO EVALUATE THE MDRD STUDY EQUATION FOR ADDITIONAL ETHNIC GROUPS, THE  ELDERLY, VARIOUS DISEASE CONDITIONS, AND PEOPLE WITH NORMAL KIDNEY FUNCTION. GFRA----REFERS TO   GFRO---REFERS TO OTHER RACES  REFERENCES AVAILABLE UPON REQUEST.      GFR est non-AA 11/17/2017 >60    Final    Calcium 11/17/2017 8.5  8.5 - 10.1 mg/dl Final    Cholesterol, total 11/17/2017 75* 140 - 199 mg/dl Final    HDL Cholesterol 11/17/2017 36* 40 - 96 mg/dl Final    Triglyceride 11/17/2017 128  29 - 150 mg/dl Final    LDL, calculated 11/17/2017 13  0 - 130 mg/dl Final    Comment: TARGET/DECISION VALUES DEPEND ON A NUMBER OF RISK FACTORS. LDL CALCULATION NOT VALID IF TRIGLYCERIDES ARE GREATER THAN 400 mg/dL.       CHOL/HDL Ratio 11/17/2017 2.1  0.0 - 5.0 Ratio Final    Glucose (POC) 11/17/2017 103  65 - 105 mg/dL Final    : [id number]    Glucose (POC) 11/17/2017 131* 65 - 105 mg/dL Final    Comment: Notified Nurse  : [id number]      Glucose (POC) 11/17/2017 115* 65 - 105 mg/dL Final    Comment: Notified Nurse  : [id number]      Glucose (POC) 11/17/2017 101  65 - 105 mg/dL Final    : [id number]    Vitamin B12 11/18/2017 500  193 - 986 pg/ml Final    Vitamin D, 25-OH, Total 11/18/2017 28.3* 30.0 - 100.0 ng/ml Final    Comment: Deficiency --  less than 20 ng/mL  Insufficiency --  20 -  29 ng/mL  Sufficiency --  30 -  100 ng/mL  Toxicity --  greater than 100 ng/mL  Deficiency --  less than 20 ng/mL  Insufficiency --  20 -  29 ng/mL  Sufficiency --  30 -  100 ng/mL  Toxicity --  greater than 100 ng/mL      WBC 11/18/2017 7.5  4.0 - 11.0 1000/mm3 Final    RBC 11/18/2017 3.61* 3.80 - 5.70 M/uL Final    HGB 11/18/2017 10.8* 12.4 - 17.2 gm/dl Final    HCT 11/18/2017 32.6* 37.0 - 50.0 % Final    MCV 11/18/2017 90.3  80.0 - 98.0 fL Final    MCH 11/18/2017 29.9  23.0 - 34.6 pg Final    MCHC 11/18/2017 33.1  30.0 - 36.0 gm/dl Final    PLATELET 33/99/1671 141  140 - 450 1000/mm3 Final    MPV 11/18/2017 11.6* 6.0 - 10.0 fL Final    RDW-SD 11/18/2017 43.8  35.1 - 43.9   Final    NRBC 11/18/2017 0  0 - 0   Final    IMMATURE GRANULOCYTES 11/18/2017 0.5  0.0 - 3.0 % Final    Comment: IG - Immature granulocytes (promyelocytes + myelocytes + metamyelocytes), their presence  indicates a left shift. An IG > 3% may predict positive blood cultures with 98% specificity.  (P<0.04) and 92% Positive Predictive Value (Kelsey-Jacquie)1. Increased immature granulocytes  assist with the detection of infection and/or inflammation and may be present at an early stage  and are more sensitive and specific than band counts.  NEUTROPHILS 11/18/2017 66.7* 34 - 64 % Final    LYMPHOCYTES 11/18/2017 24.2* 28 - 48 % Final    MONOCYTES 11/18/2017 5.7  1 - 13 % Final    EOSINOPHILS 11/18/2017 2.8  0 - 5 % Final    BASOPHILS 11/18/2017 0.1  0 - 3 % Final    Sodium 11/18/2017 140  136 - 145 mEq/L Final    Potassium 11/18/2017 3.4* 3.5 - 5.1 mEq/L Final    Chloride 11/18/2017 105  98 - 107 mEq/L Final    CO2 11/18/2017 29  21 - 32 mEq/L Final    Glucose 11/18/2017 89  74 - 106 mg/dl Final    BUN 11/18/2017 12  7 - 25 mg/dl Final    Creatinine 11/18/2017 0.5* 0.6 - 1.3 mg/dl Final    GFR est AA 11/18/2017 >60    Final    Comment: THE [facility] [employer] [facility] STATES THAT THE MDRD STUDY EQUATION SHOULD ONLY BE USED ON  INDIVIDUALS 18 OR OLDER. THE REPORT ALSO NOTES THAT THE MDRD STUDY EQUATION HAS NOT BEEN  VALIDATED FOR USE WITH THE ELDERLY (OVER 79YEARS OF AGE), PREGNANT WOMEN, PATIENTS WITH SERIOUS  COMORBID CONDITIONS, OR PERSONS WITH EXTREMES OF BODY SIZE, MUSCLE MASS, OR NUTRITIONAL STATUS. APPLICATION OF THE EQUATION TO THESE PATIENT GROUPS MAY LEAD TO ERRORS IN GFR ESTIMATION. GFR  ESTIMATING EQUATIONS HAVE POORER AGREEMENT WITH MEASURED GFR FOR ILL HOSPITALIZED PATIENTS AND  FOR PEOPLE WITH NEAR NORMAL KIDNEY FUNCTION THAN FOR SUBJECTS IN THE MDRD STUDY. VALIDATION  STUDIES ARE IN PROGRESS TO EVALUATE THE MDRD STUDY EQUATION FOR ADDITIONAL ETHNIC GROUPS, THE  ELDERLY, VARIOUS DISEASE CONDITIONS, AND PEOPLE WITH NORMAL KIDNEY FUNCTION. GFRA----REFERS TO   GFRO---REFERS TO OTHER RACES  REFERENCES AVAILABLE UPON REQUEST.      GFR est non-AA 11/18/2017 >60    Final    Calcium 11/18/2017 8.4* 8.5 - 10.1 mg/dl Final    AST (SGOT) 11/18/2017 13* 15 - 37 U/L Final    ALT (SGPT) 11/18/2017 29  12 - 78 U/L Final    Alk. phosphatase 11/18/2017 92  45 - 117 U/L Final    Bilirubin, total 11/18/2017 0.9  0.2 - 1.0 mg/dl Final    Protein, total 11/18/2017 5.5* 6.4 - 8.2 gm/dl Final    Albumin 11/18/2017 2.6* 3.4 - 5.0 gm/dl Final    Magnesium 11/18/2017 2.0  1.6 - 2.6 mg/dl Final    Phosphorus 11/18/2017 2.7  2.5 - 4.9 mg/dl Final    Glucose (POC) 11/18/2017 96  65 - 105 mg/dL Final    : [id number]    Glucose (POC) 11/18/2017 125* 65 - 105 mg/dL Final    Comment: Notified Nurse  : [id number]         . No results found for any visits on 12/29/17. Assessment / Plan      ICD-10-CM ICD-9-CM    1. DISH (diffuse idiopathic skeletal hyperostosis) M48.10 721.6 collagenase (SANTYL) 250 unit/gram ointment      CBC WITH AUTOMATED DIFF      METABOLIC PANEL, COMPREHENSIVE   2. Chronic cholecystitis K81.1 575.11 collagenase (SANTYL) 250 unit/gram ointment      CBC WITH AUTOMATED DIFF      METABOLIC PANEL, COMPREHENSIVE   3. Gallstones K80.20 574.20 collagenase (SANTYL) 250 unit/gram ointment      CBC WITH AUTOMATED DIFF      METABOLIC PANEL, COMPREHENSIVE       Labs  he was advised to continue his maintenance medications      Follow-up Disposition:  Return in about 3 months (around 3/29/2018). I asked the patient and his wife for any questions and I answered their questions.   They state that they understand the treatment plan and agree with the treatment plan

## 2017-12-29 NOTE — PROGRESS NOTES
1. Have you been to the ER, urgent care clinic since your last visit? Hospitalized since your last visit? No    2. Have you seen or consulted any other health care providers outside of the [address] [place] [address] since your last visit? Include any pap smears or colon screening.  No

## 2017-12-29 NOTE — MR AVS SNAPSHOT
Visit Information Date & Time Provider Department Dept. Phone Encounter #  
 12/29/2017  9:30 AM Monty Zhao MD [facility] OF Cristal Dominguez [phone] [id number] Follow-up Instructions Return in about 3 months (around 3/29/2018). Follow-up and Disposition History Your Appointments 3/30/2018 11:00 AM  
Follow Up with Monty Zhao MD  
[address]) Appt Note: 3 mo f/u  
 [address], [address] [place] [address]  
  
   
 [address], [address] [place] [address] Upcoming Health Maintenance Date Due DTaP/Tdap/Td series (1 - Tdap) 12/13/1956 Pneumococcal 65+ High/Highest Risk (2 of 2 - PCV13) 9/7/2017 MEDICARE YEARLY EXAM 6/22/2018 GLAUCOMA SCREENING Q2Y 6/20/2019 Allergies as of 12/29/2017  Review Complete On: 12/29/2017 By: Monty Zhao MD  
 No Known Allergies Current Immunizations  Reviewed on 7/24/2017 Name Date Influenza Vaccine (Quad) PF 11/18/2017 10:34 AM, 9/7/2016, 9/14/2015 Pneumococcal Polysaccharide (PPSV-23) 9/7/2016 Zoster Vaccine, Live 11/28/2016 Not reviewed this visit You Were Diagnosed With   
  
 Codes Comments DISH (diffuse idiopathic skeletal hyperostosis)    -  Primary ICD-10-CM: M48.10 ICD-9-CM: 289. 6 Chronic cholecystitis     ICD-10-CM: K81.1 ICD-9-CM: 575.11 Gallstones     ICD-10-CM: K80.20 ICD-9-CM: 574.20 Vitals BP Pulse Temp Resp Height(growth percentile) 110/62 (BP 1 Location: Left arm, BP Patient Position: Sitting) (!) 57 96.9 °F (36.1 °C) (Tympanic) 16 5' 11\" (1.803 m) Weight(growth percentile) SpO2 BMI Smoking Status 177 lb (80.3 kg) 98% 24.69 kg/m2 Never Smoker BMI and BSA Data Body Mass Index Body Surface Area  
 24.69 kg/m 2 2.01 m 2 Preferred Pharmacy Pharmacy Name Phone [address] [place] [address] [phone] Your Updated Medication List  
  
   
This list is accurate as of: 12/29/17 11:33 AM.  Always use your most recent med list.  
  
  
  
  
 allopurinol 300 mg tablet Commonly known as:  ZYLOPRIM  
TAKE 1 TABLET BY MOUTH ONCE DAILY  
  
 aspirin delayed-release 81 mg tablet Take 81 mg by mouth two (2) times a day. CENTRUM SILVER PO Take 1 Tab by mouth daily. collagenase 250 unit/gram ointment Commonly known as:  SANTYL Apply twice per day  
  
 donepezil 10 mg tablet Commonly known as:  ARICEPT Take 1 Tab by mouth nightly. furosemide 20 mg tablet Commonly known as:  LASIX Take  by mouth daily. haloperidol 0.5 mg tablet Commonly known as:  HALDOL TAKE 1 TABLET BY MOUTH EVERY NIGHT AT BEDTIME  
  
 metoprolol tartrate 50 mg tablet Commonly known as:  LOPRESSOR  
TAKE 1 TABLET BY MOUTH 2 TIMES A DAY  
  
 NITROSTAT 0.4 mg SL tablet Generic drug:  nitroglycerin  
by SubLINGual route every five (5) minutes as needed for Chest Pain. oxyCODONE ER 20 mg ER tablet Commonly known as:  OxyCONTIN Take 1 Tab by mouth every twelve (12) hours. Max Daily Amount: 40 mg. Indications: Chronic Pain  
  
 oxyCODONE-acetaminophen  mg per tablet Commonly known as:  PERCOCET 10 Take 1 Tab by mouth every eight (8) hours as needed for Pain (breakthrough pain). Max Daily Amount: 3 Tabs. potassium chloride 10 mEq tablet Commonly known as:  KLOR-CON Take 1 Tab by mouth three (3) times daily. ursodiol 300 mg capsule Commonly known as:  ACTIGALL  
1 capsule twice per day Prescriptions Sent to Pharmacy Refills  
 collagenase (SANTYL) 250 unit/gram ointment 0 Sig: Apply twice per day Class: Normal  
 Pharmacy: [id number] [place] [address], [facility] Ph #: [phone] Follow-up Instructions Return in about 3 months (around 3/29/2018). Patient Instructions Health Maintenance Due Topic Date Due  
 DTaP/Tdap/Td series (1 - Tdap) 12/13/1958  Pneumococcal 65+ High/Highest Risk (2 of 2 - PCV13) 09/07/2017 Introducing [facility]! Luis Mckeon introduces Support[employer] patient portal. Now you can access parts of your medical record, email your doctor's office, and request medication refills online. 1. In your internet browser, go to [email or web address] 2. Click on the First Time User? Click Here link in the Sign In box. You will see the New Member Sign Up page. 3. Enter your Support[employer] Access Code exactly as it appears below. You will not need to use this code after youve completed the sign-up process. If you do not sign up before the expiration date, you must request a new code. · SupportLocal Access Code: [email or web address] Expires: 2/14/2018 11:09 AM 
 
4. Enter the last four digits of your Social Security Number (xxxx) and Date of Birth (mm/dd/yyyy) as indicated and click Submit. You will be taken to the next sign-up page. 5. Create a SupportLocal ID. This will be your SupportLocal login ID and cannot be changed, so think of one that is secure and easy to remember. 6. Create a SupportLocal password. You can change your password at any time. 7. Enter your Password Reset Question and Answer. This can be used at a later time if you forget your password. 8. Enter your e-mail address. You will receive e-mail notification when new information is available in [address] 9. Click Sign Up. You can now view and download portions of your medical record. 10. Click the Download Summary menu link to download a portable copy of your medical information. If you have questions, please visit the Frequently Asked Questions section of the SupportLocal website. Remember, SupportLocal is NOT to be used for urgent needs. For medical emergencies, dial 911. Now available from your iPhone and Android! Please provide this summary of care documentation to your next provider. Your primary care clinician is listed as Kael Barnhart. If you have any questions after today's visit, please call [phone].

## 2017-12-29 NOTE — PATIENT INSTRUCTIONS
Health Maintenance Due   Topic Date Due    DTaP/Tdap/Td series (1 - Tdap) 12/13/1958    Pneumococcal 65+ High/Highest Risk (2 of 2 - PCV13) 09/07/2017

## 2018-01-01 ENCOUNTER — OFFICE VISIT (OUTPATIENT)
Dept: ORTHOPEDIC SURGERY | Age: 83
End: 2018-01-01

## 2018-01-01 ENCOUNTER — HOSPITAL ENCOUNTER (OUTPATIENT)
Dept: LAB | Age: 83
Discharge: HOME | End: 2018-03-21
Payer: MEDICARE

## 2018-01-01 ENCOUNTER — PATIENT OUTREACH (OUTPATIENT)
Dept: INTERNAL MEDICINE CLINIC | Age: 83
End: 2018-01-01

## 2018-01-01 ENCOUNTER — APPOINTMENT (OUTPATIENT)
Dept: GENERAL RADIOLOGY | Age: 83
End: 2018-01-01
Attending: EMERGENCY MEDICINE
Payer: MEDICARE

## 2018-01-01 ENCOUNTER — OFFICE VISIT (OUTPATIENT)
Dept: INTERNAL MEDICINE CLINIC | Age: 83
End: 2018-01-01

## 2018-01-01 ENCOUNTER — HOSPITAL ENCOUNTER (EMERGENCY)
Age: 83
Discharge: HOME | End: 2018-06-16
Attending: EMERGENCY MEDICINE
Payer: MEDICARE

## 2018-01-01 ENCOUNTER — TELEPHONE (OUTPATIENT)
Dept: INTERNAL MEDICINE CLINIC | Age: 83
End: 2018-01-01

## 2018-01-01 ENCOUNTER — TELEPHONE (OUTPATIENT)
Dept: ORTHOPEDIC SURGERY | Age: 83
End: 2018-01-01

## 2018-01-01 ENCOUNTER — HOSPITAL ENCOUNTER (OUTPATIENT)
Dept: LAB | Age: 83
Discharge: HOME | End: 2018-06-29
Payer: MEDICARE

## 2018-01-01 ENCOUNTER — HOSPITAL ENCOUNTER (OUTPATIENT)
Dept: GENERAL RADIOLOGY | Age: 83
Discharge: HOME | End: 2018-02-16
Payer: MEDICARE

## 2018-01-01 VITALS
BODY MASS INDEX: 22.9 KG/M2 | HEART RATE: 60 BPM | OXYGEN SATURATION: 94 % | WEIGHT: 163.6 LBS | TEMPERATURE: 97.5 F | HEIGHT: 71 IN | RESPIRATION RATE: 16 BRPM | SYSTOLIC BLOOD PRESSURE: 106 MMHG | DIASTOLIC BLOOD PRESSURE: 47 MMHG

## 2018-01-01 VITALS
SYSTOLIC BLOOD PRESSURE: 126 MMHG | BODY MASS INDEX: 22.82 KG/M2 | HEIGHT: 71 IN | HEART RATE: 63 BPM | TEMPERATURE: 96.8 F | WEIGHT: 163 LBS | OXYGEN SATURATION: 94 % | RESPIRATION RATE: 16 BRPM | DIASTOLIC BLOOD PRESSURE: 60 MMHG

## 2018-01-01 VITALS
TEMPERATURE: 98.7 F | RESPIRATION RATE: 20 BRPM | WEIGHT: 153 LBS | BODY MASS INDEX: 21.42 KG/M2 | OXYGEN SATURATION: 98 % | HEIGHT: 71 IN | SYSTOLIC BLOOD PRESSURE: 116 MMHG | HEART RATE: 75 BPM | DIASTOLIC BLOOD PRESSURE: 67 MMHG

## 2018-01-01 VITALS
SYSTOLIC BLOOD PRESSURE: 124 MMHG | DIASTOLIC BLOOD PRESSURE: 60 MMHG | TEMPERATURE: 97.9 F | HEART RATE: 66 BPM | BODY MASS INDEX: 21.98 KG/M2 | RESPIRATION RATE: 16 BRPM | OXYGEN SATURATION: 93 % | HEIGHT: 71 IN | WEIGHT: 157 LBS

## 2018-01-01 VITALS
HEART RATE: 97 BPM | WEIGHT: 155 LBS | HEIGHT: 71 IN | RESPIRATION RATE: 16 BRPM | SYSTOLIC BLOOD PRESSURE: 112 MMHG | TEMPERATURE: 96.1 F | BODY MASS INDEX: 21.7 KG/M2 | OXYGEN SATURATION: 97 % | DIASTOLIC BLOOD PRESSURE: 64 MMHG

## 2018-01-01 VITALS
SYSTOLIC BLOOD PRESSURE: 120 MMHG | HEIGHT: 71 IN | DIASTOLIC BLOOD PRESSURE: 58 MMHG | WEIGHT: 163 LBS | HEART RATE: 63 BPM | RESPIRATION RATE: 16 BRPM | OXYGEN SATURATION: 96 % | BODY MASS INDEX: 22.82 KG/M2 | TEMPERATURE: 97.4 F

## 2018-01-01 VITALS
HEIGHT: 71 IN | BODY MASS INDEX: 21.34 KG/M2 | HEART RATE: 89 BPM | RESPIRATION RATE: 22 BRPM | OXYGEN SATURATION: 97 % | TEMPERATURE: 99.6 F | SYSTOLIC BLOOD PRESSURE: 116 MMHG | DIASTOLIC BLOOD PRESSURE: 58 MMHG

## 2018-01-01 VITALS
HEIGHT: 71 IN | RESPIRATION RATE: 20 BRPM | OXYGEN SATURATION: 94 % | BODY MASS INDEX: 22.82 KG/M2 | WEIGHT: 163 LBS | HEART RATE: 75 BPM | TEMPERATURE: 99 F | SYSTOLIC BLOOD PRESSURE: 106 MMHG | DIASTOLIC BLOOD PRESSURE: 54 MMHG

## 2018-01-01 VITALS
RESPIRATION RATE: 18 BRPM | OXYGEN SATURATION: 96 % | HEIGHT: 71 IN | DIASTOLIC BLOOD PRESSURE: 60 MMHG | HEART RATE: 87 BPM | SYSTOLIC BLOOD PRESSURE: 110 MMHG | TEMPERATURE: 98 F

## 2018-01-01 DIAGNOSIS — K81.1 CHRONIC CHOLECYSTITIS: ICD-10-CM

## 2018-01-01 DIAGNOSIS — M48.061 SPINAL STENOSIS, LUMBAR REGION, WITHOUT NEUROGENIC CLAUDICATION: ICD-10-CM

## 2018-01-01 DIAGNOSIS — Z23 ENCOUNTER FOR IMMUNIZATION: ICD-10-CM

## 2018-01-01 DIAGNOSIS — R35.0 URINARY FREQUENCY: ICD-10-CM

## 2018-01-01 DIAGNOSIS — M50.30 DDD (DEGENERATIVE DISC DISEASE), CERVICAL: ICD-10-CM

## 2018-01-01 DIAGNOSIS — R10.9 RIGHT SIDED ABDOMINAL PAIN: ICD-10-CM

## 2018-01-01 DIAGNOSIS — M48.10 DISH (DIFFUSE IDIOPATHIC SKELETAL HYPEROSTOSIS): Chronic | ICD-10-CM

## 2018-01-01 DIAGNOSIS — R50.9 FEVER OF UNKNOWN ORIGIN (FUO): Primary | ICD-10-CM

## 2018-01-01 DIAGNOSIS — Z79.891 ENCOUNTER FOR LONG-TERM OPIATE ANALGESIC USE: Primary | ICD-10-CM

## 2018-01-01 DIAGNOSIS — K80.20 GALLSTONES: ICD-10-CM

## 2018-01-01 DIAGNOSIS — G93.40 ENCEPHALOPATHY: ICD-10-CM

## 2018-01-01 DIAGNOSIS — R05.9 COUGH: ICD-10-CM

## 2018-01-01 DIAGNOSIS — R10.11 RIGHT UPPER QUADRANT ABDOMINAL PAIN: ICD-10-CM

## 2018-01-01 DIAGNOSIS — M54.50 LOW BACK PAIN: ICD-10-CM

## 2018-01-01 DIAGNOSIS — M48.10 DISH (DIFFUSE IDIOPATHIC SKELETAL HYPEROSTOSIS): Primary | Chronic | ICD-10-CM

## 2018-01-01 DIAGNOSIS — R09.89 CHEST CONGESTION: Primary | ICD-10-CM

## 2018-01-01 DIAGNOSIS — A41.9 SEPSIS, DUE TO UNSPECIFIED ORGANISM: Primary | ICD-10-CM

## 2018-01-01 DIAGNOSIS — M48.061 SPINAL STENOSIS, LUMBAR REGION, WITHOUT NEUROGENIC CLAUDICATION: Primary | ICD-10-CM

## 2018-01-01 DIAGNOSIS — I10 ESSENTIAL HYPERTENSION, BENIGN: ICD-10-CM

## 2018-01-01 DIAGNOSIS — Z74.01 BED CONFINEMENT STATUS: Primary | ICD-10-CM

## 2018-01-01 DIAGNOSIS — A41.9 SEPSIS, DUE TO UNSPECIFIED ORGANISM: ICD-10-CM

## 2018-01-01 DIAGNOSIS — K81.1 CHRONIC CHOLECYSTITIS: Primary | ICD-10-CM

## 2018-01-01 DIAGNOSIS — R05.9 COUGH: Primary | ICD-10-CM

## 2018-01-01 DIAGNOSIS — J18.9 PNEUMONIA OF LEFT LOWER LOBE DUE TO INFECTIOUS ORGANISM: ICD-10-CM

## 2018-01-01 DIAGNOSIS — Z79.891 ENCOUNTER FOR LONG-TERM OPIATE ANALGESIC USE: ICD-10-CM

## 2018-01-01 DIAGNOSIS — L89.312 DECUBITUS ULCER OF RIGHT BUTTOCK, STAGE 2 (HCC): ICD-10-CM

## 2018-01-01 DIAGNOSIS — S20.212A CONTUSION OF RIB ON LEFT SIDE, INITIAL ENCOUNTER: Primary | ICD-10-CM

## 2018-01-01 DIAGNOSIS — Z00.00 PREVENTATIVE HEALTH CARE: Primary | ICD-10-CM

## 2018-01-01 LAB
ALBUMIN SERPL-MCNC: 3.1 G/DL (ref 3.4–5)
ALBUMIN SERPL-MCNC: 3.2 G/DL (ref 3.4–5)
ALBUMIN/GLOB SERPL: 0.9 {RATIO} (ref 0.8–1.7)
ALBUMIN/GLOB SERPL: 1.1 {RATIO} (ref 0.8–1.7)
ALP SERPL-CCNC: 222 U/L (ref 45–117)
ALP SERPL-CCNC: 76 U/L (ref 45–117)
ALT SERPL-CCNC: 21 U/L (ref 16–61)
ALT SERPL-CCNC: 48 U/L (ref 16–61)
ANION GAP SERPL CALC-SCNC: 7 MMOL/L (ref 3–18)
ANION GAP SERPL CALC-SCNC: 7 MMOL/L (ref 3–18)
AST SERPL-CCNC: 18 U/L (ref 15–37)
AST SERPL-CCNC: 8 U/L (ref 15–37)
BASOPHILS # BLD: 0 K/UL (ref 0–0.06)
BASOPHILS # BLD: 0 K/UL (ref 0–0.06)
BASOPHILS NFR BLD: 0 % (ref 0–2)
BASOPHILS NFR BLD: 0 % (ref 0–2)
BILIRUB SERPL-MCNC: 0.3 MG/DL (ref 0.2–1)
BILIRUB SERPL-MCNC: 0.7 MG/DL (ref 0.2–1)
BUN SERPL-MCNC: 17 MG/DL (ref 7–18)
BUN SERPL-MCNC: 20 MG/DL (ref 7–18)
BUN/CREAT SERPL: 24 (ref 12–20)
BUN/CREAT SERPL: 27 (ref 12–20)
CALCIUM SERPL-MCNC: 8.8 MG/DL (ref 8.5–10.1)
CALCIUM SERPL-MCNC: 9.3 MG/DL (ref 8.5–10.1)
CHLORIDE SERPL-SCNC: 100 MMOL/L (ref 100–108)
CHLORIDE SERPL-SCNC: 101 MMOL/L (ref 100–108)
CO2 SERPL-SCNC: 30 MMOL/L (ref 21–32)
CO2 SERPL-SCNC: 31 MMOL/L (ref 21–32)
CREAT SERPL-MCNC: 0.7 MG/DL (ref 0.6–1.3)
CREAT SERPL-MCNC: 0.74 MG/DL (ref 0.6–1.3)
DIFFERENTIAL METHOD BLD: ABNORMAL
DIFFERENTIAL METHOD BLD: ABNORMAL
EOSINOPHIL # BLD: 0.1 K/UL (ref 0–0.4)
EOSINOPHIL # BLD: 0.3 K/UL (ref 0–0.4)
EOSINOPHIL NFR BLD: 1 % (ref 0–5)
EOSINOPHIL NFR BLD: 4 % (ref 0–5)
ERYTHROCYTE [DISTWIDTH] IN BLOOD BY AUTOMATED COUNT: 14.6 % (ref 11.6–14.5)
ERYTHROCYTE [DISTWIDTH] IN BLOOD BY AUTOMATED COUNT: 15 % (ref 11.6–14.5)
GLOBULIN SER CALC-MCNC: 2.8 G/DL (ref 2–4)
GLOBULIN SER CALC-MCNC: 3.6 G/DL (ref 2–4)
GLUCOSE SERPL-MCNC: 122 MG/DL (ref 74–99)
GLUCOSE SERPL-MCNC: 152 MG/DL (ref 74–99)
HCT VFR BLD AUTO: 38.3 % (ref 36–48)
HCT VFR BLD AUTO: 40.2 % (ref 36–48)
HGB BLD-MCNC: 12.6 G/DL (ref 13–16)
HGB BLD-MCNC: 13.1 G/DL (ref 13–16)
LYMPHOCYTES # BLD: 1.4 K/UL (ref 0.9–3.6)
LYMPHOCYTES # BLD: 2.4 K/UL (ref 0.9–3.6)
LYMPHOCYTES NFR BLD: 11 % (ref 21–52)
LYMPHOCYTES NFR BLD: 29 % (ref 21–52)
MCH RBC QN AUTO: 28.9 PG (ref 24–34)
MCH RBC QN AUTO: 30 PG (ref 24–34)
MCHC RBC AUTO-ENTMCNC: 32.6 G/DL (ref 31–37)
MCHC RBC AUTO-ENTMCNC: 32.9 G/DL (ref 31–37)
MCV RBC AUTO: 88.7 FL (ref 74–97)
MCV RBC AUTO: 91.2 FL (ref 74–97)
MONOCYTES # BLD: 0.8 K/UL (ref 0.05–1.2)
MONOCYTES # BLD: 0.9 K/UL (ref 0.05–1.2)
MONOCYTES NFR BLD: 10 % (ref 3–10)
MONOCYTES NFR BLD: 7 % (ref 3–10)
NEUTS SEG # BLD: 4.8 K/UL (ref 1.8–8)
NEUTS SEG # BLD: 9.9 K/UL (ref 1.8–8)
NEUTS SEG NFR BLD: 57 % (ref 40–73)
NEUTS SEG NFR BLD: 81 % (ref 40–73)
PLATELET # BLD AUTO: 257 K/UL (ref 135–420)
PLATELET # BLD AUTO: 383 K/UL (ref 135–420)
PMV BLD AUTO: 10.9 FL (ref 9.2–11.8)
PMV BLD AUTO: 11.1 FL (ref 9.2–11.8)
POTASSIUM SERPL-SCNC: 4.4 MMOL/L (ref 3.5–5.5)
POTASSIUM SERPL-SCNC: 4.5 MMOL/L (ref 3.5–5.5)
PROT SERPL-MCNC: 6 G/DL (ref 6.4–8.2)
PROT SERPL-MCNC: 6.7 G/DL (ref 6.4–8.2)
RBC # BLD AUTO: 4.2 M/UL (ref 4.7–5.5)
RBC # BLD AUTO: 4.53 M/UL (ref 4.7–5.5)
SODIUM SERPL-SCNC: 138 MMOL/L (ref 136–145)
SODIUM SERPL-SCNC: 138 MMOL/L (ref 136–145)
WBC # BLD AUTO: 12.2 K/UL (ref 4.6–13.2)
WBC # BLD AUTO: 8.3 K/UL (ref 4.6–13.2)

## 2018-01-01 PROCEDURE — 72100 X-RAY EXAM L-S SPINE 2/3 VWS: CPT

## 2018-01-01 PROCEDURE — 85025 COMPLETE CBC W/AUTO DIFF WBC: CPT | Performed by: INTERNAL MEDICINE

## 2018-01-01 PROCEDURE — 99282 EMERGENCY DEPT VISIT SF MDM: CPT

## 2018-01-01 PROCEDURE — 80053 COMPREHEN METABOLIC PANEL: CPT | Performed by: INTERNAL MEDICINE

## 2018-01-01 PROCEDURE — 71101 X-RAY EXAM UNILAT RIBS/CHEST: CPT

## 2018-01-01 PROCEDURE — 36415 COLL VENOUS BLD VENIPUNCTURE: CPT | Performed by: INTERNAL MEDICINE

## 2018-01-01 RX ORDER — DONEPEZIL HYDROCHLORIDE 10 MG/1
TABLET, FILM COATED ORAL
Qty: 90 TAB | Refills: 0 | Status: SHIPPED | OUTPATIENT
Start: 2018-01-01

## 2018-01-01 RX ORDER — FUROSEMIDE 20 MG/1
TABLET ORAL
Qty: 30 TAB | Refills: 2 | Status: SHIPPED | OUTPATIENT
Start: 2018-01-01

## 2018-01-01 RX ORDER — LEVOFLOXACIN 500 MG/1
TABLET, FILM COATED ORAL
Qty: 7 TAB | Refills: 0 | Status: SHIPPED | OUTPATIENT
Start: 2018-01-01 | End: 2018-01-01

## 2018-01-01 RX ORDER — CLINDAMYCIN HYDROCHLORIDE 300 MG/1
CAPSULE ORAL
Qty: 25 CAP | Refills: 0 | Status: CANCELLED | OUTPATIENT
Start: 2018-01-01

## 2018-01-01 RX ORDER — SULFAMETHOXAZOLE AND TRIMETHOPRIM 200; 40 MG/5ML; MG/5ML
SUSPENSION ORAL
Qty: 150 ML | Refills: 0 | Status: SHIPPED | OUTPATIENT
Start: 2018-01-01 | End: 2018-01-01

## 2018-01-01 RX ORDER — COLLAGENASE SANTYL 250 [ARB'U]/G
OINTMENT TOPICAL
Qty: 30 G | Refills: 0 | Status: SHIPPED | OUTPATIENT
Start: 2018-01-01 | End: 2018-01-01

## 2018-01-01 RX ORDER — OXYCODONE AND ACETAMINOPHEN 7.5; 325 MG/1; MG/1
TABLET ORAL
Qty: 120 TAB | Refills: 0 | Status: SHIPPED | OUTPATIENT
Start: 2018-01-01 | End: 2018-01-01 | Stop reason: SDUPTHER

## 2018-01-01 RX ORDER — CLINDAMYCIN HYDROCHLORIDE 300 MG/1
300 CAPSULE ORAL 3 TIMES DAILY
Qty: 30 CAP | Refills: 0 | Status: SHIPPED | OUTPATIENT
Start: 2018-01-01 | End: 2018-01-01

## 2018-01-01 RX ORDER — POTASSIUM CHLORIDE 750 MG/1
TABLET, EXTENDED RELEASE ORAL
Qty: 60 TAB | Refills: 1 | Status: SHIPPED | OUTPATIENT
Start: 2018-01-01 | End: 2018-01-01

## 2018-01-01 RX ORDER — POTASSIUM CHLORIDE 750 MG/1
TABLET, FILM COATED, EXTENDED RELEASE ORAL
Qty: 60 TAB | Refills: 1 | Status: SHIPPED | OUTPATIENT
Start: 2018-01-01 | End: 2018-01-01 | Stop reason: SDUPTHER

## 2018-01-01 RX ORDER — OXYCODONE HCL 10 MG/1
10 TABLET, FILM COATED, EXTENDED RELEASE ORAL EVERY 12 HOURS
Qty: 60 TAB | Refills: 0 | Status: SHIPPED | OUTPATIENT
Start: 2018-07-24

## 2018-01-01 RX ORDER — AMOXICILLIN AND CLAVULANATE POTASSIUM 875; 125 MG/1; MG/1
TABLET, FILM COATED ORAL
Qty: 14 TAB | Refills: 0 | Status: ON HOLD | OUTPATIENT
Start: 2018-01-01 | End: 2018-01-01

## 2018-01-01 RX ORDER — POTASSIUM CHLORIDE 750 MG/1
TABLET, FILM COATED, EXTENDED RELEASE ORAL
Qty: 60 TAB | Refills: 1 | Status: SHIPPED | OUTPATIENT
Start: 2018-01-01

## 2018-01-01 RX ORDER — ALLOPURINOL 300 MG/1
TABLET ORAL
Qty: 90 TAB | Refills: 3 | Status: SHIPPED | OUTPATIENT
Start: 2018-01-01

## 2018-01-01 RX ORDER — OXYCODONE AND ACETAMINOPHEN 7.5; 325 MG/1; MG/1
TABLET ORAL
Qty: 120 TAB | Refills: 0 | Status: SHIPPED | OUTPATIENT
Start: 2018-01-01

## 2018-01-01 RX ORDER — OXYCODONE HCL 10 MG/1
10 TABLET, FILM COATED, EXTENDED RELEASE ORAL EVERY 12 HOURS
Qty: 60 TAB | Refills: 0 | Status: SHIPPED | OUTPATIENT
Start: 2018-01-01 | End: 2018-01-01 | Stop reason: SDUPTHER

## 2018-01-01 RX ORDER — DONEPEZIL HYDROCHLORIDE 10 MG/1
10 TABLET, FILM COATED ORAL
Qty: 90 TAB | Refills: 0 | Status: SHIPPED | OUTPATIENT
Start: 2018-01-01 | End: 2018-01-01 | Stop reason: SDUPTHER

## 2018-01-01 RX ORDER — FUROSEMIDE 20 MG/1
20 TABLET ORAL DAILY
COMMUNITY
Start: 2018-01-01 | End: 2018-01-01

## 2018-01-01 RX ORDER — HALOPERIDOL 0.5 MG/1
TABLET ORAL
Qty: 30 TAB | Refills: 2 | Status: SHIPPED | OUTPATIENT
Start: 2018-01-01

## 2018-01-01 RX ORDER — CIPROFLOXACIN 500 MG/1
TABLET ORAL
Qty: 14 TAB | Refills: 0 | Status: SHIPPED | OUTPATIENT
Start: 2018-01-01 | End: 2018-01-01 | Stop reason: ALTCHOICE

## 2018-01-01 RX ORDER — HALOPERIDOL 0.5 MG/1
TABLET ORAL
Qty: 30 TAB | Refills: 2 | Status: SHIPPED | OUTPATIENT
Start: 2018-01-01 | End: 2018-01-01 | Stop reason: SDUPTHER

## 2018-01-16 NOTE — TELEPHONE ENCOUNTER
PATIENT WIFE DAVID WHO IS ON THE HIPPA FORM CALLED FOR DR. Mary Ann Chase.     MRS. ELENA SAID THAT  TOLD THE PATIENT THAT SHE WOULD NOT GIVE HIM ANYMORE MEDICATION AND WILL REFER HIM TO PAIN MANAGEMENT.     MRS. ELENA SAID THAT THE PATIENT WAS SUPPOSED TO BE REFERRED TO A PAIN MANAGEMENT NEAR [facility].    MRS. DAVID ELENA TEL. [phone] OR CELL # [phone]. MRS. ELENA SAID THE PATIENT IS RUNNING OUT OF HIS MEDICATION.

## 2018-01-16 NOTE — TELEPHONE ENCOUNTER
Referral to pain management Dr. Margaret Alcocer. Can you check on this? We may need to give a refill if patient will not be seen soon.

## 2018-01-18 NOTE — TELEPHONE ENCOUNTER
Please have patient make med FU for refills. He filled his medication 1/1 and 1/17.      Please place PM refer to Dr. Pan Speaks per Dr. Aureliano Gaines note

## 2018-01-30 NOTE — MR AVS SNAPSHOT
[address] [facility] [address] [facility] [address] 
[phone] Patient: Caty Samaniego. MRN: [id number] Visit Information Date & Time Provider Department Dept. Phone Encounter #  
 2018 10:30 AM Charisma Gomez NP [facility] [phone] [id number] Follow-up Instructions Return in about 3 months (around 2018) for w/ Cari Ward if not in PM.  
 Follow-up and Disposition History Your Appointments 3/30/2018 11:00 AM  
Follow Up with Sudhakar Haley MD  
[address] [facility]) Appt Note: 3 mo f/u  
 [address] [facility], [address] [facility] [address]  
  
   
 [address] [facility], [address] [facility] [address] Upcoming Health Maintenance Date Due DTaP/Tdap/Td series (1 - Tdap) 1956 Pneumococcal 65+ High/Highest Risk (2 of 2 - PCV13) 2017 MEDICARE YEARLY EXAM 2018 GLAUCOMA SCREENING Q2Y 2019 Allergies as of 2018  Review Complete On: 2018 By: Bina Moya No Known Allergies Current Immunizations  Reviewed on 2017 Name Date Influenza Vaccine (Quad) PF 2017 10:34 AM, 2016, 2015 Pneumococcal Polysaccharide (PPSV-23) 2016 Zoster Vaccine, Live 2016 Not reviewed this visit You Were Diagnosed With   
  
 Codes Comments Encounter for long-term opiate analgesic use    -  Primary ICD-10-CM: W32.124 ICD-9-CM: V58.69 Spinal stenosis, lumbar region, without neurogenic claudication     ICD-10-CM: M48.061 
ICD-9-CM: 724.02 Vitals BP Pulse Temp Resp Height(growth percentile) Weight(growth percentile) 106/47 60 97.5 °F (36.4 °C) (Oral) 16 5' 11\" (1.803 m) 163 lb 9.6 oz (74.2 kg) SpO2 BMI Smoking Status 94% 22.82 kg/m2 Never Smoker Vitals History BMI and BSA Data Body Mass Index Body Surface Area  
 22.82 kg/m 2 1.93 m 2 Preferred Pharmacy Pharmacy Name Phone [address], [address] [facility] [phone] Your Updated Medication List  
  
   
This list is accurate as of: 1/30/18 11:02 AM.  Always use your most recent med list.  
  
  
  
  
 allopurinol 300 mg tablet Commonly known as:  ZYLOPRIM  
TAKE 1 TABLET BY MOUTH ONCE DAILY  
  
 aspirin delayed-release 81 mg tablet Take 81 mg by mouth two (2) times a day. CENTRUM SILVER PO Take 1 Tab by mouth daily. collagenase 250 unit/gram ointment Commonly known as:  SANTYL Apply twice per day  
  
 donepezil 10 mg tablet Commonly known as:  ARICEPT Take 1 Tab by mouth nightly. furosemide 20 mg tablet Commonly known as:  LASIX TAKE 1 TABLET BY MOUTH EVERY MORNING  
  
 haloperidol 0.5 mg tablet Commonly known as:  HALDOL TAKE 1 TABLET BY MOUTH EVERY NIGHT AT BEDTIME  
  
 metoprolol tartrate 50 mg tablet Commonly known as:  LOPRESSOR  
TAKE 1 TABLET BY MOUTH 2 TIMES A DAY  
  
 NITROSTAT 0.4 mg SL tablet Generic drug:  nitroglycerin  
by SubLINGual route every five (5) minutes as needed for Chest Pain. oxyCODONE ER 20 mg ER tablet Commonly known as:  OxyCONTIN Take 1 Tab by mouth every twelve (12) hours. Max Daily Amount: 40 mg. Indications: Chronic Pain  
  
 oxyCODONE-acetaminophen  mg per tablet Commonly known as:  PERCOCET 10 Take 1 Tab by mouth every eight (8) hours as needed for Pain (breakthrough pain). Max Daily Amount: 3 Tabs. * potassium chloride 10 mEq tablet Commonly known as:  KLOR-CON Take 1 Tab by mouth three (3) times daily. * potassium chloride 10 mEq tablet Commonly known as:  KLOR-CON  
TAKE 1 TABLET BY MOUTH 2 TIMES A DAY  
  
 ursodiol 300 mg capsule Commonly known as:  ACTIGALL  
1 capsule twice per day * Notice: This list has 2 medication(s) that are the same as other medications prescribed for you. Read the directions carefully, and ask your doctor or other care provider to review them with you. Follow-up Instructions Return in about 3 months (around 4/30/2018) for w/ Liliana Esquivel if not in PM. To-Do List   
 01/30/2018 Lab:  DRUG SCREEN UR - W/ CONFIRM Introducing [facility]! Jaun Tamez introduces [employer] patient portal. Now you can access parts of your medical record, email your doctor's office, and request medication refills online. 1. In your internet browser, go to [email or web address] 2. Click on the First Time User? Click Here link in the Sign In box. You will see the New Member Sign Up page. 3. Enter your [employer] Access Code exactly as it appears below. You will not need to use this code after youve completed the sign-up process. If you do not sign up before the expiration date, you must request a new code. · [employer] Access Code: [email or web address] Expires: 2/14/2018 11:09 AM 
 
4. Enter the last four digits of your Social Security Number (xxxx) and Date of Birth (mm/dd/yyyy) as indicated and click Submit. You will be taken to the next sign-up page. 5. Create a [employer] ID. This will be your [employer] login ID and cannot be changed, so think of one that is secure and easy to remember. 6. Create a [employer] password. You can change your password at any time. 7. Enter your Password Reset Question and Answer. This can be used at a later time if you forget your password. 8. Enter your e-mail address. You will receive e-mail notification when new information is available in [address] 9. Click Sign Up. You can now view and download portions of your medical record. 10. Click the Download Summary menu link to download a portable copy of your medical information.  
 
If you have questions, please visit the Frequently Asked Questions section of the [employer]. Remember, [employer] is NOT to be used for urgent needs. For medical emergencies, dial 911. Now available from your iPhone and Android! Please provide this summary of care documentation to your next provider. Your primary care clinician is listed as Sancho Stewart. If you have any questions after today's visit, please call [phone].

## 2018-01-30 NOTE — PROGRESS NOTES
[facility] [address], [address]  [place], [address] [place] [address] Street  Phone: [phone]  Fax: [phone]    Alysha Mckinney  : 1935  PCP: Kike Cordero MD    PROGRESS NOTE    HISTORY OF PRESENT ILLNESS:  Chief Complaint   Patient presents with    Back Pain    Follow-up     med refill      Suze Castillo. is a [de-identified] y.o.  male with history of chronic back pain and medication management. He has a history of a TIA 17. Pt states he has been using Percocet  mg BID-TID for BTP and Oxycontin 20 mg ER q12 with  relief. Has constipation that he takes Miralax for constipation. He was referred to PM at his last visit. We are waiting for him to get accepted into this. Denies bladder/bowel dysfunction, saddle paresthesia, weakness, gait disturbance, or other neurological deficit. Pt at this time desires to continue with current care/proceed with medication evaluation. Opioid Assessment    This is a patient with chronic lumbar pain that contributes to his pain. Pain is described as a hurting pain and is worse with activity and weather and affects sleep, standing and recreational activities, and his ability to perform ADLs. Pain is better with relaxation and pain medication. He has tried; PT-Yes,  Non-opioid medications Yes, and spinal injections Yes. Least pain over the last week has been 4/10. Worst pain over the last week has been 9/10. Opioid Risk Tool Reviewed: YES, Score 0  Aberrant behaviors: None. Urine Drug Screen: due - order placed. Controlled substance agreement on file: YES.  reviewed:yes  Pill count is consistent with his prescription: yes and n/a  Concomitant use of a benzodiazepine: no    Naloxone prescription is warranted. It has been provided or is already on file.    Also,  abstinence syndrome was reviewed and discussed with her today N/A    Reports that pain would be a 9/10 w/out medication and that it goes down to a 4/10 after medication. This enables the pt to be functional, achieve ADLs and engage in social activities. Risks and benefits of  opiate therapy have been reviewed with the patient. No pain behaviors. Denies thoughts of harming self or others. Pt has a good risk to benefit ratio which allows the pt to function in a home environment without side effects      This is a chronic problem that is stable. Per review of available records and patients , there are not sign of overuse, misuse, diversion, or concerning side effects. Today we reviewed: the risk of overdose, addiction, and dependency  The following changes were made to the patients current treatment plan: Referral to Pain Management  nothing, medications refilled. We will refill his medications in 2 weeks if needed. UDS today. ASSESSMENT  [de-identified] y.o. male with lumbar pain. Diagnoses and all orders for this visit:    1. Encounter for long-term opiate analgesic use  -     DRUG SCREEN UR - W/ CONFIRM; Future    2. Spinal stenosis, lumbar region, without neurogenic claudication       IMPRESSION/PLAN    1) Pt was given information on back care. 2) Referral to Dr. Leana Simon has been placed. We are waiting for the patient to be accepted. UDS today. Patient may call for month-month refills until he is accepted into PM. Percocet and OxyContin. 3) Will make 3 month FU if patient is not accepted by then. 4) Mr. Charmayne Mare has a reminder for a \"due or due soon\" health maintenance. I have asked that he contact his primary care provider, Tessa Julien MD, for follow-up on this health maintenance. 5) We have informed patient to notify us for immediate appointment if he has any worsening neurogical symptoms or if an emergency situation presents, then call 911  6) Pt will follow-up in  3 months if not accepted into PM.    Risks and benefits of ongoing opiate therapy have been reviewed with the patient.  is appropriate.  UDS results have been consistent, last UDS 8/2017, UDS today. No pain behaviors. Denies thoughts of harming self or others. Pt has a good risk to benefit ratio which allows the pt to function in a home environment without side effects. PAST MEDICAL HISTORY  Past Medical History:   Diagnosis Date    3-vessel coronary artery disease 02/13/2004    Tech limited. Low normal to mildly depressed LV systolic function. LVH. DDfx. LAE. PASP 25-30. 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 mLAD 100%. CX diffuse nonobstructive. OM1 patent stent. pRCA 100%. Dbl seq SVG to D1, OM1 100%. SVG to pRPDA 99% (3.5 x 23-mm Xience stent). Patent mid segment stent. LIMA to LAD ok.  Cardiac echocardiogram 11/16/2014    [facility]:  EF 55-60%. DDfx. Normal RVSP. Mild TR. Neg bubble study for atrial shunt. No significant valvular pathology.  Cardiac nuclear imaging test, high risk 06/15/2012    Lg area of mod mid to distal anterior ischemia. Lg area of inferior ischemia. EF 57%. Mild mid-distal anterior, inferior hypk.   High risk pharm stress test.    Cervical spine pain     Deviated septum     s/p corrective surgery 7/1/2010    Disc disease, degenerative, cervical     joint pain, back pain    Dysphagia, unspecified(787.20)     Essential hypertension, benign     Gout with other specified manifestations(274.89)     Head injury     frequent headaches    Hypercholesterolemia     Insomnia, unspecified     Lumbar canal stenosis     Meniere's disease     Myocardial infarction, anterior wall (HCC)     DIEGO (obstructive sleep apnea)     on CPAP    S/P diskectomy 7/5/07    cervical    Spinal stenosis, lumbar region, without neurogenic claudication     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     Unspecified sinusitis (chronic)     Ventral hernia, unspecified, without mention of obstruction or gangrene     S/p repair 6/2003        MEDICATIONS  Current Outpatient Prescriptions   Medication Sig Dispense Refill    potassium chloride (KLOR-CON) 10 mEq tablet TAKE 1 TABLET BY MOUTH 2 TIMES A DAY 60 Tab 1    furosemide (LASIX) 20 mg tablet TAKE 1 TABLET BY MOUTH EVERY MORNING 30 Tab 2    collagenase (SANTYL) 250 unit/gram ointment Apply twice per day 30 g 0    haloperidol (HALDOL) 0.5 mg tablet TAKE 1 TABLET BY MOUTH EVERY NIGHT AT BEDTIME 30 Tab 2    oxyCODONE-acetaminophen (PERCOCET 10)  mg per tablet Take 1 Tab by mouth every eight (8) hours as needed for Pain (breakthrough pain). Max Daily Amount: 3 Tabs. 90 Tab 0    oxyCODONE ER (OXYCONTIN) 20 mg ER tablet Take 1 Tab by mouth every twelve (12) hours. Max Daily Amount: 40 mg. Indications: Chronic Pain 60 Tab 0    potassium chloride (KLOR-CON) 10 mEq tablet Take 1 Tab by mouth three (3) times daily. 90 Tab 1    nitroglycerin (NITROSTAT) 0.4 mg SL tablet by SubLINGual route every five (5) minutes as needed for Chest Pain.  ursodiol (ACTIGALL) 300 mg capsule 1 capsule twice per day 60 Cap 3    metoprolol tartrate (LOPRESSOR) 50 mg tablet TAKE 1 TABLET BY MOUTH 2 TIMES A DAY (Patient taking differently: TAKE 1 TABLET BY MOUTH 2 TIMES A DAY, reports 25mg BID) 60 Tab 11    donepezil (ARICEPT) 10 mg tablet Take 1 Tab by mouth nightly. 30 Tab 0    allopurinol (ZYLOPRIM) 300 mg tablet TAKE 1 TABLET BY MOUTH ONCE DAILY 90 Tab 3    aspirin delayed-release 81 mg tablet Take 81 mg by mouth two (2) times a day.  MULTIVITAMINS W-MINERALS/LUT (CENTRUM SILVER PO) Take 1 Tab by mouth daily. ALLERGIES  No Known Allergies    SOCIAL HISTORY    Social History     Social History    Marital status:      Spouse name: N/A    Number of children: N/A    Years of education: N/A     Occupational History    Not on file.      Social History Main Topics    Smoking status: Never Smoker    Smokeless tobacco: Never Used    Alcohol use Yes    Drug use: No    Sexual activity: No     Other Topics Concern    Not on file     Social History Narrative       SUBJECTIVE    Pain Scale: 6/10    Pain Assessment  1/30/2018   Location of Pain Back   Pain Location Comment lower back   Location Modifiers -   Severity of Pain 6   Quality of Pain Aching   Quality of Pain Comment -   Duration of Pain Persistent   Frequency of Pain Constant   Date Pain First Started -   Aggravating Factors Stairs; Walking;Standing;Squatting;Kneeling;Exercise;Straightening;Stretching;Bending   Aggravating Factors Comment -   Limiting Behavior -   Relieving Factors Other (Comment)   Relieving Factors Comment meds   Result of Injury No       Accompanied by self. REVIEW OF SYSTEMS  ROS    Constitutional: Negative for fever, chills, or weight change. Respiratory: Negative for cough or shortness of breath. Cardiovascular: Negative for chest pain or palpitations. Gastrointestinal: Negative for acid reflux, change in bowel habits, or constipation. Genitourinary: Negative for incontinence, dysuria and flank pain. Musculoskeletal: Positive for lumbar pain. Skin: Negative for rash. Neurological: Negative for headaches, dizziness, or numbness. Endo/Heme/Allergies: Negative . Psychiatric/Behavioral: Negative. PHYSICAL EXAMINATION  Visit Vitals    /47    Pulse 60    Temp 97.5 °F (36.4 °C) (Oral)    Resp 16    Ht 5' 11\" (1.803 m)    Wt 163 lb 9.6 oz (74.2 kg)    SpO2 94%    BMI 22.82 kg/m2       Constitutional: Well developed,  well nourished,  awake, alert, and in no acute distress. Neurological:  Sensation to light touch is intact. Psychiatric: Affect and mood are appropriate. Integumentary: No rashes or abrasions noted on exposed areas,  warm, dry and intact. Cardiovascular/Peripheral Vascular:  No peripheral edema is noted. Lymphatic:  No evidence of lymphedema.  No cervical lymphadenopathy. SPINE/MUSCULOSKELETAL EXAM    Lumbar spine:  No rash, ecchymosis, or gross obliquity. No fasciculations. No focal atrophy is noted. Range of motion is intact. No Tenderness to palpation. SI joints non-tender. Trochanters non tender. Musculoskeletal:  No pain with extension, axial loading, or forward flexion. No pain with internal or external rotation of his hips. MOTOR     Hip Flex  Quads Hamstrings Ankle DF EHL Ankle PF   Right +4/5 +4/5 +4/5 +4/5 +4/5 +4/5   Left +4/5 +4/5 +4/5 +4/5 +4/5 +4/5   Straight Leg raise negative bilaterally. normal gait and station    Ambulation without assistive device. full weight bearing, non-antalgic gait.     Jazmine Pan NP

## 2018-02-15 NOTE — TELEPHONE ENCOUNTER
Requested Prescriptions     Pending Prescriptions Disp Refills    donepezil (ARICEPT) 10 mg tablet 30 Tab 0     Sig: Take 1 Tab by mouth nightly.

## 2018-02-22 PROBLEM — K80.20 CALCULUS OF GALLBLADDER WITHOUT CHOLECYSTITIS: Status: RESOLVED | Noted: 2017-01-01 | Resolved: 2018-01-01

## 2018-02-22 NOTE — PROGRESS NOTES
1. Have you been to the ER, urgent care clinic since your last visit? [address] since your last visit? [address] - abdominal pain     2. Have you seen or consulted any other health care providers outside of the [address] [place] [address] since your last visit? Include any pap smears or colon screening.  No

## 2018-02-22 NOTE — PATIENT INSTRUCTIONS
Health Maintenance Due   Topic Date Due    DTaP/Tdap/Td  (1 - Tdap) 12/13/1956    Pneumococcal Vaccine (2 of 2 - PCV13) 09/07/2017

## 2018-02-24 PROBLEM — D72.829 LEUKOCYTOSIS: Status: RESOLVED | Noted: 2017-01-01 | Resolved: 2018-01-01

## 2018-02-24 PROBLEM — G93.40 ACUTE ENCEPHALOPATHY: Status: RESOLVED | Noted: 2017-01-01 | Resolved: 2018-01-01

## 2018-02-24 PROBLEM — E87.6 HYPOKALEMIA: Status: RESOLVED | Noted: 2017-01-01 | Resolved: 2018-01-01

## 2018-02-24 PROBLEM — J18.9 CAP (COMMUNITY ACQUIRED PNEUMONIA): Status: RESOLVED | Noted: 2017-01-01 | Resolved: 2018-01-01

## 2018-02-24 PROBLEM — R11.10 VOMITING: Status: RESOLVED | Noted: 2017-01-01 | Resolved: 2018-01-01

## 2018-02-24 PROBLEM — R10.9 INTRACTABLE ABDOMINAL PAIN: Status: RESOLVED | Noted: 2017-01-01 | Resolved: 2018-01-01

## 2018-02-24 NOTE — PROGRESS NOTES
The patient presents to the office today with the chief complaint of abdominal pain    HPI    The patient has cholelithiasis with chronic cholecystitis. The patient has an external drain in his gall bladder. The patient recently was seen in the ER for right upper quadrant abdominal pain. The work up in the ER was negative. The patient continues with significant back stiffness and pain. Unchanged. The patient continues with intermittent confusion. Review of Systems   Respiratory: Negative for shortness of breath. Cardiovascular: Negative for chest pain and leg swelling. Gastrointestinal: Positive for abdominal pain. Musculoskeletal: Positive for back pain. No Known Allergies    Current Outpatient Prescriptions   Medication Sig Dispense Refill    ciprofloxacin HCl (CIPRO) 500 mg tablet 1 tablet twice per day 14 Tab 0    oxyCODONE-acetaminophen (PERCOCET) 5-325 mg per tablet Take 1 Tab by mouth every four (4) hours as needed for Pain.  donepezil (ARICEPT) 10 mg tablet Take 1 Tab by mouth nightly. 90 Tab 0    potassium chloride (KLOR-CON) 10 mEq tablet TAKE 1 TABLET BY MOUTH 2 TIMES A DAY 60 Tab 1    furosemide (LASIX) 20 mg tablet TAKE 1 TABLET BY MOUTH EVERY MORNING 30 Tab 2    haloperidol (HALDOL) 0.5 mg tablet TAKE 1 TABLET BY MOUTH EVERY NIGHT AT BEDTIME 30 Tab 2    oxyCODONE ER (OXYCONTIN) 20 mg ER tablet Take 1 Tab by mouth every twelve (12) hours. Max Daily Amount: 40 mg. Indications: Chronic Pain 60 Tab 0    nitroglycerin (NITROSTAT) 0.4 mg SL tablet by SubLINGual route every five (5) minutes as needed for Chest Pain.       ursodiol (ACTIGALL) 300 mg capsule 1 capsule twice per day 60 Cap 3    metoprolol tartrate (LOPRESSOR) 50 mg tablet TAKE 1 TABLET BY MOUTH 2 TIMES A DAY (Patient taking differently: TAKE 1/2 TABLET BY MOUTH 2 TIMES A DAY, reports 25mg BID) 60 Tab 11    allopurinol (ZYLOPRIM) 300 mg tablet TAKE 1 TABLET BY MOUTH ONCE DAILY 90 Tab 3    aspirin delayed-release 81 mg tablet Take 81 mg by mouth two (2) times a day.  MULTIVITAMINS W-MINERALS/LUT (CENTRUM SILVER PO) Take 1 Tab by mouth daily. Past Medical History:   Diagnosis Date    3-vessel coronary artery disease 02/13/2004    Tech limited. Low normal to mildly depressed LV systolic function. LVH. DDfx. LAE. PASP 25-30. 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 mLAD 100%. CX diffuse nonobstructive. OM1 patent stent. pRCA 100%. Dbl seq SVG to D1, OM1 100%. SVG to pRPDA 99% (3.5 x 23-mm Xience stent). Patent mid segment stent. LIMA to LAD ok.  Cardiac echocardiogram 11/16/2014    [facility]:  EF 55-60%. DDfx. Normal RVSP. Mild TR. Neg bubble study for atrial shunt. No significant valvular pathology.  Cardiac nuclear imaging test, high risk 06/15/2012    Lg area of mod mid to distal anterior ischemia. Lg area of inferior ischemia. EF 57%. Mild mid-distal anterior, inferior hypk.   High risk pharm stress test.    Cervical spine pain     Deviated septum     s/p corrective surgery 7/1/2010    Disc disease, degenerative, cervical     joint pain, back pain    Dysphagia, unspecified(787.20)     Essential hypertension, benign     Gout with other specified manifestations(274.89)     Head injury     frequent headaches    Hypercholesterolemia     Insomnia, unspecified     Lumbar canal stenosis     Meniere's disease     Myocardial infarction, anterior wall (HCC)     DIEGO (obstructive sleep apnea)     on CPAP    S/P diskectomy 7/5/07    cervical    Spinal stenosis, lumbar region, without neurogenic claudication     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     Unspecified sinusitis (chronic)     Ventral hernia, unspecified, without mention of obstruction or gangrene     S/p repair 6/2003       Past Surgical History:   Procedure Laterality Date    CARDIAC CATHETERIZATION  3/15/2017         CARDIAC SURG PROCEDURE UNLIST  1/04    angioplasty of a proximal left anterior descending    CARDIAC SURG PROCEDURE UNLIST  6/2/08    angioplasty and stenting of totally obstructed saphenous vein graft to the posterior descending artery    CORONARY ARTERY ANGIOGRAM  3/15/2017         HX APPENDECTOMY      HX CATARACT REMOVAL      bilateral    HX CERVICAL DISKECTOMY  7/07    and fusion    HX CORONARY ARTERY BYPASS GRAFT  4/14/94    X4, with LIMA to the LAD, double sequential to the diagonal and circumflex and single to the main right coronary artery    HX CORONARY STENT PLACEMENT  6/20/2012    HX GI  1992    two feet of colon removed    HX HEART CATHETERIZATION  6/02/08    HX HEART CATHETERIZATION  6/20/2012    HX HEENT  7/1/10    deviated nasal septum surgery    HX HERNIA REPAIR  7/2003    two prior hernia repairs in the past    HX OTHER SURGICAL      HX PTCA  6/20/2012    HX TONSILLECTOMY      as a child    ND [address] [facility]      for crushed disk       Social History     Social History    Marital status:      Spouse name: N/A    Number of children: N/A    Years of education: N/A     Occupational History    Not on file. Social History Main Topics    Smoking status: Never Smoker    Smokeless tobacco: Never Used    Alcohol use Yes    Drug use: No    Sexual activity: No     Other Topics Concern    Not on file     Social History Narrative       Patient does have an advanced directive on file    Visit Vitals    /54 (BP 1 Location: Left arm, BP Patient Position: Sitting)    Pulse 75    Temp 99 °F (37.2 °C) (Tympanic)    Resp 20    Ht 5' 11\" (1.803 m)    Wt 163 lb (73.9 kg)    SpO2 94%    BMI 22.73 kg/m2       Physical Exam   Neck: Carotid bruit is not present. Cardiovascular: Normal rate and regular rhythm.   Exam reveals no gallop. No murmur heard. Pulmonary/Chest: He has no wheezes. He has no rales. Abdominal: Soft. Bowel sounds are normal. He exhibits no distension and no mass. There is tenderness (Right upper quadrant). Musculoskeletal: He exhibits no edema. Neurological:   Mildly confused       BMI:  OK    Admission on 02/20/2018, Discharged on 02/20/2018   Component Date Value Ref Range Status    WBC 02/20/2018 5.6  4.0 - 11.0 1000/mm3 Final    RBC 02/20/2018 4.67  3.80 - 5.70 M/uL Final    HGB 02/20/2018 13.9  12.4 - 17.2 gm/dl Final    HCT 02/20/2018 43.0  37.0 - 50.0 % Final    MCV 02/20/2018 92.1  80.0 - 98.0 fL Final    MCH 02/20/2018 29.8  23.0 - 34.6 pg Final    MCHC 02/20/2018 32.3  30.0 - 36.0 gm/dl Final    PLATELET 86/51/3047 019  140 - 450 1000/mm3 Final    MPV 02/20/2018 10.7* 6.0 - 10.0 fL Final    RDW-SD 02/20/2018 45.9* 35.1 - 43.9   Final    NRBC 02/20/2018 0  0 - 0   Final    IMMATURE GRANULOCYTES 02/20/2018 0.7  0.0 - 3.0 % Final    Comment: IG - Immature granulocytes (promyelocytes + myelocytes + metamyelocytes), their presence  indicates a left shift. An IG > 3% may predict positive blood cultures with 98% specificity.  (P<0.04) and 92% Positive Predictive Value (Kelesy-Jacquie)1. Increased immature granulocytes  assist with the detection of infection and/or inflammation and may be present at an early stage  and are more sensitive and specific than band counts.       NEUTROPHILS 02/20/2018 54.8  34 - 64 % Final    LYMPHOCYTES 02/20/2018 33.4  28 - 48 % Final    MONOCYTES 02/20/2018 7.1  1 - 13 % Final    EOSINOPHILS 02/20/2018 3.6  0 - 5 % Final    BASOPHILS 02/20/2018 0.4  0 - 3 % Final    Sodium 02/20/2018 140  136 - 145 mEq/L Final    Potassium 02/20/2018 4.0  3.5 - 5.1 mEq/L Final    Chloride 02/20/2018 101  98 - 107 mEq/L Final    CO2 02/20/2018 34* 21 - 32 mEq/L Final    Glucose 02/20/2018 120* 74 - 106 mg/dl Final    BUN 02/20/2018 18  7 - 25 mg/dl Final    Creatinine 02/20/2018 0.8  0.6 - 1.3 mg/dl Final    GFR est AA 02/20/2018 >60    Final    Comment: THE [facility] STATES THAT THE MDRD STUDY EQUATION SHOULD ONLY BE USED ON  INDIVIDUALS 18 OR OLDER. THE REPORT ALSO NOTES THAT THE MDRD STUDY EQUATION HAS NOT BEEN  VALIDATED FOR USE WITH THE ELDERLY (OVER 79YEARS OF AGE), PREGNANT WOMEN, PATIENTS WITH SERIOUS  COMORBID CONDITIONS, OR PERSONS WITH EXTREMES OF BODY SIZE, MUSCLE MASS, OR NUTRITIONAL STATUS. APPLICATION OF THE EQUATION TO THESE PATIENT GROUPS MAY LEAD TO ERRORS IN GFR ESTIMATION. GFR  ESTIMATING EQUATIONS HAVE POORER AGREEMENT WITH MEASURED GFR FOR ILL HOSPITALIZED PATIENTS AND  FOR PEOPLE WITH NEAR NORMAL KIDNEY FUNCTION THAN FOR SUBJECTS IN THE MDRD STUDY. VALIDATION  STUDIES ARE IN PROGRESS TO EVALUATE THE MDRD STUDY EQUATION FOR ADDITIONAL ETHNIC GROUPS, THE  ELDERLY, VARIOUS DISEASE CONDITIONS, AND PEOPLE WITH NORMAL KIDNEY FUNCTION. GFRA----REFERS TO   GFRO---REFERS TO OTHER RACES  REFERENCES AVAILABLE UPON REQUEST.      GFR est non-AA 02/20/2018 >60    Final    Calcium 02/20/2018 9.4  8.5 - 10.1 mg/dl Final    AST (SGOT) 02/20/2018 13* 15 - 37 U/L Final    ALT (SGPT) 02/20/2018 13  12 - 78 U/L Final    Alk.  phosphatase 02/20/2018 93  45 - 117 U/L Final    Bilirubin, total 02/20/2018 0.7  0.2 - 1.0 mg/dl Final    Protein, total 02/20/2018 6.9  6.4 - 8.2 gm/dl Final    Albumin 02/20/2018 3.5  3.4 - 5.0 gm/dl Final    Lipase 02/20/2018 123  73 - 393 U/L Final    Magnesium 02/20/2018 2.1  1.6 - 2.6 mg/dl Final    Sodium 02/20/2018 140  136 - 145 mEq/L Final    Potassium 02/20/2018 4.1  3.5 - 4.9 mEq/L Final    Chloride 02/20/2018 99  98 - 107 mEq/L Final    CO2, TOTAL 02/20/2018 34* 21 - 32 mmol/L Final    Glucose 02/20/2018 124* 74 - 106 mg/dL Final    BUN 02/20/2018 19  7 - 25 mg/dl Final    Creatinine 02/20/2018 0.8  0.6 - 1.3 mg/dl Final    HCT 02/20/2018 44  40 - 54 % Final    HGB 02/20/2018 15.0  12.4 - 17.2 gm/dl Final    CALCIUM,IONIZED 02/20/2018 4.70  4.40 - 5.40 mg/dL Final   Hospital Outpatient Visit on 12/29/2017   Component Date Value Ref Range Status    WBC 12/29/2017 5.7  4.6 - 13.2 K/uL Final    RBC 12/29/2017 4.60* 4.70 - 5.50 M/uL Final    HGB 12/29/2017 13.5  13.0 - 16.0 g/dL Final    HCT 12/29/2017 42.3  36.0 - 48.0 % Final    MCV 12/29/2017 92.0  74.0 - 97.0 FL Final    MCH 12/29/2017 29.3  24.0 - 34.0 PG Final    MCHC 12/29/2017 31.9  31.0 - 37.0 g/dL Final    RDW 12/29/2017 14.0  11.6 - 14.5 % Final    PLATELET 61/08/1898 228  135 - 420 K/uL Final    MPV 12/29/2017 11.6  9.2 - 11.8 FL Final    NEUTROPHILS 12/29/2017 45  40 - 73 % Final    LYMPHOCYTES 12/29/2017 41  21 - 52 % Final    MONOCYTES 12/29/2017 9  3 - 10 % Final    EOSINOPHILS 12/29/2017 5  0 - 5 % Final    BASOPHILS 12/29/2017 0  0 - 2 % Final    ABS. NEUTROPHILS 12/29/2017 2.6  1.8 - 8.0 K/UL Final    ABS. LYMPHOCYTES 12/29/2017 2.3  0.9 - 3.6 K/UL Final    ABS. MONOCYTES 12/29/2017 0.5  0.05 - 1.2 K/UL Final    ABS. EOSINOPHILS 12/29/2017 0.3  0.0 - 0.4 K/UL Final    ABS.  BASOPHILS 12/29/2017 0.0  0.0 - 0.06 K/UL Final    DF 12/29/2017 AUTOMATED    Final    Sodium 12/29/2017 141  136 - 145 mmol/L Final    Potassium 12/29/2017 4.7  3.5 - 5.5 mmol/L Final    Chloride 12/29/2017 103  100 - 108 mmol/L Final    CO2 12/29/2017 34* 21 - 32 mmol/L Final    Anion gap 12/29/2017 4  3.0 - 18 mmol/L Final    Glucose 12/29/2017 110* 74 - 99 mg/dL Final    BUN 12/29/2017 18  7.0 - 18 MG/DL Final    Creatinine 12/29/2017 0.71  0.6 - 1.3 MG/DL Final    BUN/Creatinine ratio 12/29/2017 25* 12 - 20   Final    GFR est AA 12/29/2017 >60  >60 ml/min/1.73m2 Final    GFR est non-AA 12/29/2017 >60  >60 ml/min/1.73m2 Final    Comment: (NOTE)  Estimated GFR is calculated using the Modification of Diet in Renal   Disease (MDRD) Study equation, reported for both  Americans   (GFRAA) and non- Americans (GFRNA), and normalized to 1.73m2   body surface area. The physician must decide which value applies to   the patient. The MDRD study equation should only be used in   individuals age 25 or older. It has not been validated for the   following: pregnant women, patients with serious comorbid conditions,   or on certain medications, or persons with extremes of body size,   muscle mass, or nutritional status.  Calcium 12/29/2017 9.2  8.5 - 10.1 MG/DL Final    Bilirubin, total 12/29/2017 0.8  0.2 - 1.0 MG/DL Final    ALT (SGPT) 12/29/2017 55  16 - 61 U/L Final    AST (SGOT) 12/29/2017 26  15 - 37 U/L Final    Alk. phosphatase 12/29/2017 135* 45 - 117 U/L Final    Protein, total 12/29/2017 6.5  6.4 - 8.2 g/dL Final    Albumin 12/29/2017 3.8  3.4 - 5.0 g/dL Final    Globulin 12/29/2017 2.7  2.0 - 4.0 g/dL Final    A-G Ratio 12/29/2017 1.4  0.8 - 1.7   Final   Admission on 12/19/2017, Discharged on 12/19/2017   Component Date Value Ref Range Status    Prothrombin time 12/19/2017 13.1  0.0 - 14.0 seconds Final    INR 12/19/2017 1.1  0.0 - 1.1   Final    Comment: PATIENTS RECEIVING THE ANTIBIOTIC CUBICIN (DAPTOMYCIN FOR INJECTION) SHOULD BE TESTED FOR PT/INR  BY AN ALTERNATIVE METHOD. CUBICIN HAS BEEN SHOWN TO CAUSE FALSE ELEVATION OF THE INR USING  i-STAT PT/INR CARTRIDGES. .No results found for any visits on 02/22/18. Assessment / Plan      ICD-10-CM ICD-9-CM    1. Chronic cholecystitis K81.1 575.11    2. Right sided abdominal pain R10.9 789.09    3. Gallstones K80.20 574.20    4. Encephalopathy G93.40 348.30        A course of Cipro  he was advised to continue his maintenance medications      Follow-up Disposition:  Return in about 3 months (around 5/22/2018). I asked Suze Stubbs if he has any questions and I answered the questions. Bahrendt A.  [place]. states that he understands the treatment plan and agrees with the treatment plan

## 2018-03-10 PROBLEM — R41.82 ALTERED MENTAL STATUS: Status: ACTIVE | Noted: 2018-01-01

## 2018-03-10 PROBLEM — J96.90 RESPIRATORY FAILURE (HCC): Status: ACTIVE | Noted: 2018-01-01

## 2018-03-10 PROBLEM — A41.9 SEPSIS (HCC): Status: ACTIVE | Noted: 2018-01-01

## 2018-03-10 PROBLEM — J18.9 PNEUMONIA: Status: ACTIVE | Noted: 2018-01-01

## 2018-03-16 NOTE — PROGRESS NOTES
Transitions of Care Coordination    Lincoln Monson was hospitalized at [facility] 3/10-3/15/18 for PNA, sepsis, AMS and discharged to home with [facility] care. RRAT = 29    Portions of the Discharge Summary written by Dr. Jamal Lopez on 3/15/18 are below in italics. Discharge Diagnosis:   Sepsis due to PNA  Lactic acidosis due to Sepsis   Hx of CAD  DM type 2  HLP   Vascular dementia   HTN     CONCERNS WHICH NEED CLOSE FOLLOW-UP:  1. Aspiration precaution. Fully alert awake and upright when eat. Hospital Course:    Pt received IV zosyn and vanco.   IVF   Insulin   Blood culture +1 out of 2, coag negative staph, likely contamination. repeat blood cultures no growth to date. Patient has tolerated puréed diet, advance diet as tolerated. Patient and his wife accept risk of aspiration. Start flutter valve. Decreased pain medication, try to improve patient's alertness. - Body mass index is 23.01 kg/(m^2). Weight and dietary counseling provided. PT and OT  - Code status: DNR     HPI on Admission (per admitting physician): Suze Amanda a 82 y. o. male with PMHx CAD and previous MI s/p CABG x4 in 1994, CVA with residual dysarthria, hypertension, diabetes mellitus- type II, chronic back pain with hx of diffuse idiopathic skeletal hyperostosis, lumbar spinal stenosis and previous cervical diskectomy and fusion, hyperlipidemia, gout who presented to ED   with wife with confusion, weakness, unresponsiveness that suddenly started earlier today. He has been experiencing some cough for the past couple of days for which his wife was giving him some cough medication. Wife went to run some errands and when she returned she found him urinating in the front of the house. Also was feeling more short of breath and was not responding properly and was more confused than his usual dementia. He occasionally has trouble with swallowing.  He has a history of aspiration and pneumonia due to this     Operative Procedures: No surgery found         Treatment Team:   Treatment Team: Attending Provider: Pancho Hughes DO; Consulting Provider: Artemio Velez MD; Consulting Provider: Dalia Collier MD; Hospitalist: Pancho Hughes DO; Care Manager: Deborah Gaston RN; Occupational Therapist: Tania Angel OT; Consulting Provider: PALLIATIVE CARE     Physical Exam on Discharge:       Visit Vitals    /56 (BP 1 Location: Left arm, BP Patient Position: Supine)    Pulse 76    Temp 98.5 °F (36.9 °C)    Resp 17    Ht 5' 11\" (1.803 m)    Wt 76.3 kg (168 lb 3.4 oz)    SpO2 100%    BMI 23.46 kg/m2        Sepsis Transition of Care Note     Discharge vital signs: as listed directly above    Source of Infection:  Pneumonia     Antibiotic prescribed at discharge: Augmentin. Length of remaining treatment: 5 days. Infectious Disease Consult during admission: no     Patient Questions:     Are you taking your prescribed antibiotic? yes   Have you needed any fever reducers: no  What is your respiratory rate? Normal rate per wife  What is your heart rate? Unable to assess  Can you tell me your name, , and why you were in the hospital?  Unable to assess. Wife states is at baseline. Have you experienced any fever, chills or confusion? no  Are you experiencing any of the following; none    Scheduled follow up appointment with PCP: Dr. Schmidt Ards 3/23/18 1530    Current [place] Coma Scale:     Eye Opening Response:  4 = Spontaneous      Verbal Response:  4 = Confused at baseline   Motor Response:   Obey commands     Reached patient's wife and identified self/role. Per wife patient is at baseline today. Wife is expecting to hear from [facility] home care re start of care either today or tomorrow. Discussed s/s of sepsis in detail as listed.   Wife stated prior to hospitalization patient was shaking, chilled and more confused than usual.  Wife able to teach back 6/8 s/s of sepsis and to state understanding that if these s/s appear it is a medical emergency. Discussed aspiration precautions in detail. Wife stated patient eats sitting upright and at a very slow pace. Opportunity to ask questions was provided. Contact information was given for future questions, concerns or assistance. Will follow. NN provided patient/caregive education as follows:    Sepsis is a very serious illness. The infection is caused by germs called bacteria. That can spread through your body very quickly. It will cause you to not feel well when you should be getting better. The infection makes it hard for oxygen  and nutrients to get to body organs, like the kidneys and lungs, and they may start to have problems working. Sepsis is so serious that it  may even cause death in some people who have a weaken immune systems or other health problems if their bodies are not able to fight infections. So it is important that we stay in touch with you after you are discharged from the hospital to watch you for any problems that may arise. When we check on you we want to know if you are experiencing any of the following signs and symptoms:    Signs     Confusion   Fast breathing   Chills or shaking   Fever or low body temperature   Fast heart beat   Lightheadedness   Less urine output   Skin rash or skin color changes    Are you at risk? Anyone can get sepsis, but certain people have a greater risk, such as:     People with catheters   Those with severe burns or trauma   Babies younger than 1 year   People over the age of 72   Those with chronic diseases like liver disease, kidney disease or cancer   Those who have weak immune systems because of treatments or illness    Prevention    When you get home from the hospital it is important that you: If you are on antibiotic you need to take it to completion. Report any of the above signs and symptoms of  to your provider or your navigator that is checking on you in the first 30 days you are home.    Making sure you wash your hands frequently it is the best way to prevent germs from spreading. You can wash your hands with well with soap and water or use alcohol based hand  . Be sure anyone helping with your care or visiting you also washes their hands before touching you. Staying current on vaccines, like the flu and pneumonia will also help to prevent infection. If have been treated for sepsis in the hospital it is important that you report any worsening symptoms as well. The key to a safe transition from the hospital is being an informed patient and working with your health care team very closely over the next 30 days once you get home. Treatment     If signs and symptoms develop, your provider will see you in the office and discuss the best place for you to receive care. Your provider will likely order blood tests or other tests may be done to check for Sepsis. You may need to start on intravenous(IV) antibiotics, oxygen, and other medicines may be part of your treatment.   If body organs start to  not work properly , other treatments may be needed and you will likely have to go back to the hospital.

## 2018-03-22 NOTE — PROGRESS NOTES
Transitions of Care Coordination    David Saldivar was hospitalized at [facility] 3/10-3/15/18 for PNA, sepsis, AMS and discharged to home with [facility]. The patient attended an appointment with Dr. Letty Castle on 3/21/18. Per visit note VSS. No new medications noted. A CBC, BMP, and UA were ordered and done. The office note is not yet complete. 3/21/2018  9:21 PM - Pedro, [facility] [employer]   Component Results   Component Value Flag Ref Range Units Status   WBC 8.3  4.6 - 13.2 K/uL Final   RBC 4.20 (L) 4.70 - 5.50 M/uL Final   HGB 12.6 (L) 13.0 - 16.0 g/dL Final   HCT 38.3  36.0 - 48.0 % Final   MCV 91.2  74.0 - 97.0 FL Final   MCH 30.0  24.0 - 34.0 PG Final   MCHC 32.9  31.0 - 37.0 g/dL Final   RDW 14.6 (H) 11.6 - 14.5 % Final   PLATELET 202  444 - 116 K/uL Final   MPV 11.1  9.2 - 11.8 FL Final   NEUTROPHILS 57  40 - 73 % Final   LYMPHOCYTES 29  21 - 52 % Final   MONOCYTES 10  3 - 10 % Final   EOSINOPHILS 4  0 - 5 % Final   BASOPHILS 0  0 - 2 % Final   ABS. NEUTROPHILS 4.8  1.8 - 8.0 K/UL Final   ABS. LYMPHOCYTES 2.4  0.9 - 3.6 K/UL Final   ABS. MONOCYTES 0.8  0.05 - 1.2 K/UL Final   ABS. EOSINOPHILS 0.3  0.0 - 0.4 K/UL Final   ABS. BASOPHILS 0.0  0.0 - 0.06 K/UL Final   DF AUTOMATED     Final     3/21/2018 10:09 PM - Pedro, [facility] [employer]   Component Results   Component Value Flag Ref Range Units Status   Sodium 138  136 - 145 mmol/L Final   Potassium 4.5  3.5 - 5.5 mmol/L Final   Chloride 101  100 - 108 mmol/L Final   CO2 30  21 - 32 mmol/L Final   Anion gap 7  3.0 - 18 mmol/L Final   Glucose 122 (H) 74 - 99 mg/dL Final   BUN 17  7.0 - 18 MG/DL Final   Creatinine 0.70  0.6 - 1.3 MG/DL Final   BUN/Creatinine ratio 24 (H) 12 - 20   Final   GFR est AA >60  >60 ml/min/1.73m2 Final   GFR est non-AA >60  >60 ml/min/1.73m2 Final     Calcium 8.8  8.5 - 10.1 MG/DL Final   Bilirubin, total 0.3  0.2 - 1.0 MG/DL Final   ALT (SGPT) 21  16 - 61 U/L Final   AST (SGOT) 8 (L) 15 - 37 U/L Final   Alk.  phosphatase 76  45 - 117 U/L Final   Protein, total 6.0 (L) 6.4 - 8.2 g/dL Final   Albumin 3.2 (L) 3.4 - 5.0 g/dL Final   Globulin 2.8  2.0 - 4.0 g/dL Final   A-G Ratio 1.1  0.8 - 1.7   Final     Reached patient's wife and identified self/role. Mrs. Allison Wynn stated the patient is having a good day today. A therapist from [facility] home care was in the home at the time of the call. Next appointment with Dr. Margarita Newsome is 3/30/18 and wife is aware. Opportunity to ask questions was provided. Contact information was given for future questions, concerns or assistance. Will follow. Goals Addressed             Most Recent       Post Hospitalization     Attends follow-up appointments as directed. On track (3/22/2018)             Plan:  Monitor for attendance at apts and assist as needed to schedule.   3/21/18-Attended apt with Dr. Margarita Newsome

## 2018-03-22 NOTE — PROGRESS NOTES
The patient presents to the office today with a chief complain of weakness and for transition of care    HPI:    The patient was recently hospitalized at [facility]) for sepsis apparently for pneumonia. The patient was discharged on 3/15/2018 and contacted by Lencho Bui on 3/16/2018 for follow up. An appointment was made for the patient to see me today. The patient remains weak. He continues with pain in his right upper quadrant of the abdomen. The patient complains of urinary frequency. Review of Systems   Respiratory: Negative for shortness of breath. Cardiovascular: Negative for chest pain. Gastrointestinal: Abdominal pain: right upper quadrant of the abdomen. Genitourinary: Positive for frequency. Neurological: Positive for weakness. No Known Allergies    Current Outpatient Prescriptions   Medication Sig Dispense Refill    oxyCODONE-acetaminophen (PERCOCET) 5-325 mg per tablet Take 1 Tab by mouth every four (4) hours as needed for Pain.  donepezil (ARICEPT) 10 mg tablet Take 1 Tab by mouth nightly. 90 Tab 0    haloperidol (HALDOL) 0.5 mg tablet TAKE 1 TABLET BY MOUTH EVERY NIGHT AT BEDTIME 30 Tab 2    oxyCODONE ER (OXYCONTIN) 20 mg ER tablet Take 1 Tab by mouth every twelve (12) hours. Max Daily Amount: 40 mg. Indications: Chronic Pain 60 Tab 0    nitroglycerin (NITROSTAT) 0.4 mg SL tablet by SubLINGual route every five (5) minutes as needed for Chest Pain.  metoprolol tartrate (LOPRESSOR) 50 mg tablet TAKE 1 TABLET BY MOUTH 2 TIMES A DAY (Patient taking differently: TAKE 1/2 TABLET BY MOUTH 2 TIMES A DAY, reports 25mg BID) 60 Tab 11    allopurinol (ZYLOPRIM) 300 mg tablet TAKE 1 TABLET BY MOUTH ONCE DAILY 90 Tab 3    aspirin delayed-release 81 mg tablet Take 81 mg by mouth two (2) times a day.  MULTIVITAMINS W-MINERALS/LUT (CENTRUM SILVER PO) Take 1 Tab by mouth daily.          Past Medical History:   Diagnosis Date    3-vessel coronary artery disease 02/13/2004    Tech limited. Low normal to mildly depressed LV systolic function. LVH. DDfx. LAE. PASP 25-30. 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 mLAD 100%. CX diffuse nonobstructive. OM1 patent stent. pRCA 100%. Dbl seq SVG to D1, OM1 100%. SVG to pRPDA 99% (3.5 x 23-mm Xience stent). Patent mid segment stent. LIMA to LAD ok.  Cardiac echocardiogram 11/16/2014    [facility]:  EF 55-60%. DDfx. Normal RVSP. Mild TR. Neg bubble study for atrial shunt. No significant valvular pathology.  Cardiac nuclear imaging test, high risk 06/15/2012    Lg area of mod mid to distal anterior ischemia. Lg area of inferior ischemia. EF 57%. Mild mid-distal anterior, inferior hypk.   High risk pharm stress test.    Cervical spine pain     Deviated septum     s/p corrective surgery 7/1/2010    Disc disease, degenerative, cervical     joint pain, back pain    Dysphagia, unspecified(787.20)     Essential hypertension, benign     Gout with other specified manifestations(274.89)     Head injury     frequent headaches    Hypercholesterolemia     Insomnia, unspecified     Lumbar canal stenosis     Meniere's disease     Myocardial infarction, anterior wall (HCC)     DIEGO (obstructive sleep apnea)     on CPAP    S/P diskectomy 7/5/07    cervical    Spinal stenosis, lumbar region, without neurogenic claudication     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     Unspecified sinusitis (chronic)     Ventral hernia, unspecified, without mention of obstruction or gangrene     S/p repair 6/2003       Past Surgical History:   Procedure Laterality Date    CARDIAC CATHETERIZATION  3/15/2017         CARDIAC SURG PROCEDURE UNLIST  1/04    angioplasty of a proximal left anterior descending    CARDIAC SURG PROCEDURE UNLIST  6/2/08    angioplasty and stenting of totally obstructed saphenous vein graft to the posterior descending artery    CORONARY ARTERY ANGIOGRAM  3/15/2017         HX APPENDECTOMY      HX CATARACT REMOVAL      bilateral    HX CERVICAL DISKECTOMY  7/07    and fusion    HX CORONARY ARTERY BYPASS GRAFT  4/14/94    X4, with LIMA to the LAD, double sequential to the diagonal and circumflex and single to the main right coronary artery    HX CORONARY STENT PLACEMENT  6/20/2012    HX GI  1992    two feet of colon removed    HX HEART CATHETERIZATION  6/02/08    HX HEART CATHETERIZATION  6/20/2012    HX HEENT  7/1/10    deviated nasal septum surgery    HX HERNIA REPAIR  7/2003    two prior hernia repairs in the past    HX OTHER SURGICAL      HX PTCA  6/20/2012    HX TONSILLECTOMY      as a child    [facility] [address] [facility]       Social History     Social History    Marital status:      Spouse name: N/A    Number of children: N/A    Years of education: N/A     Occupational History    Not on file. Social History Main Topics    Smoking status: Never Smoker    Smokeless tobacco: Never Used    Alcohol use Yes    Drug use: No    Sexual activity: No     Other Topics Concern    Not on file     Social History Narrative       Patient does have an advanced directive on file    Visit Vitals    /60 (BP 1 Location: Left arm, BP Patient Position: Sitting)    Pulse 87    Temp 98 °F (36.7 °C) (Tympanic)    Resp 18    Ht 5' 11\" (1.803 m)    SpO2 96%       Physical Exam   Constitutional:   Patient appears weak. He speaks little   Neck: Carotid bruit is not present. Cardiovascular: Normal rate. Exam reveals no gallop. No murmur heard. Pulmonary/Chest: He has no wheezes. He has no rales. Abdominal: Soft. He exhibits no distension. There is tenderness (moderate tenderness right upper quadrant).    Musculoskeletal: He exhibits no edema.       BMI:  [facility] Outpatient Visit on 03/21/2018   Component Date Value Ref Range Status    WBC 03/21/2018 8.3  4.6 - 13.2 K/uL Final    RBC 03/21/2018 4.20* 4.70 - 5.50 M/uL Final    HGB 03/21/2018 12.6* 13.0 - 16.0 g/dL Final    HCT 03/21/2018 38.3  36.0 - 48.0 % Final    MCV 03/21/2018 91.2  74.0 - 97.0 FL Final    MCH 03/21/2018 30.0  24.0 - 34.0 PG Final    MCHC 03/21/2018 32.9  31.0 - 37.0 g/dL Final    RDW 03/21/2018 14.6* 11.6 - 14.5 % Final    PLATELET 74/23/3899 563  135 - 420 K/uL Final    MPV 03/21/2018 11.1  9.2 - 11.8 FL Final    NEUTROPHILS 03/21/2018 57  40 - 73 % Final    LYMPHOCYTES 03/21/2018 29  21 - 52 % Final    MONOCYTES 03/21/2018 10  3 - 10 % Final    EOSINOPHILS 03/21/2018 4  0 - 5 % Final    BASOPHILS 03/21/2018 0  0 - 2 % Final    ABS. NEUTROPHILS 03/21/2018 4.8  1.8 - 8.0 K/UL Final    ABS. LYMPHOCYTES 03/21/2018 2.4  0.9 - 3.6 K/UL Final    ABS. MONOCYTES 03/21/2018 0.8  0.05 - 1.2 K/UL Final    ABS. EOSINOPHILS 03/21/2018 0.3  0.0 - 0.4 K/UL Final    ABS. BASOPHILS 03/21/2018 0.0  0.0 - 0.06 K/UL Final    DF 03/21/2018 AUTOMATED    Final    Sodium 03/21/2018 138  136 - 145 mmol/L Final    Potassium 03/21/2018 4.5  3.5 - 5.5 mmol/L Final    Chloride 03/21/2018 101  100 - 108 mmol/L Final    CO2 03/21/2018 30  21 - 32 mmol/L Final    Anion gap 03/21/2018 7  3.0 - 18 mmol/L Final    Glucose 03/21/2018 122* 74 - 99 mg/dL Final    BUN 03/21/2018 17  7.0 - 18 MG/DL Final    Creatinine 03/21/2018 0.70  0.6 - 1.3 MG/DL Final    BUN/Creatinine ratio 03/21/2018 24* 12 - 20   Final    GFR est AA 03/21/2018 >60  >60 ml/min/1.73m2 Final    GFR est non-AA 03/21/2018 >60  >60 ml/min/1.73m2 Final    Comment: (NOTE)  Estimated GFR is calculated using the Modification of Diet in Renal   Disease (MDRD) Study equation, reported for both  Americans   (GFRAA) and non- Americans (GFRNA), and normalized to 1.73m2   body surface area.  The physician must decide which value applies to   the patient. The MDRD study equation should only be used in   individuals age 25 or older. It has not been validated for the   following: pregnant women, patients with serious comorbid conditions,   or on certain medications, or persons with extremes of body size,   muscle mass, or nutritional status.  Calcium 03/21/2018 8.8  8.5 - 10.1 MG/DL Final    Bilirubin, total 03/21/2018 0.3  0.2 - 1.0 MG/DL Final    ALT (SGPT) 03/21/2018 21  16 - 61 U/L Final    AST (SGOT) 03/21/2018 8* 15 - 37 U/L Final    Alk. phosphatase 03/21/2018 76  45 - 117 U/L Final    Protein, total 03/21/2018 6.0* 6.4 - 8.2 g/dL Final    Albumin 03/21/2018 3.2* 3.4 - 5.0 g/dL Final    Globulin 03/21/2018 2.8  2.0 - 4.0 g/dL Final    A-G Ratio 03/21/2018 1.1  0.8 - 1.7   Final   Admission on 03/10/2018, Discharged on 03/15/2018   No results displayed because visit has over 200 results. Admission on 02/20/2018, Discharged on 02/20/2018   Component Date Value Ref Range Status    WBC 02/20/2018 5.6  4.0 - 11.0 1000/mm3 Final    RBC 02/20/2018 4.67  3.80 - 5.70 M/uL Final    HGB 02/20/2018 13.9  12.4 - 17.2 gm/dl Final    HCT 02/20/2018 43.0  37.0 - 50.0 % Final    MCV 02/20/2018 92.1  80.0 - 98.0 fL Final    MCH 02/20/2018 29.8  23.0 - 34.6 pg Final    MCHC 02/20/2018 32.3  30.0 - 36.0 gm/dl Final    PLATELET 65/05/9728 256  140 - 450 1000/mm3 Final    MPV 02/20/2018 10.7* 6.0 - 10.0 fL Final    RDW-SD 02/20/2018 45.9* 35.1 - 43.9   Final    NRBC 02/20/2018 0  0 - 0   Final    IMMATURE GRANULOCYTES 02/20/2018 0.7  0.0 - 3.0 % Final    Comment: IG - Immature granulocytes (promyelocytes + myelocytes + metamyelocytes), their presence  indicates a left shift. An IG > 3% may predict positive blood cultures with 98% specificity.  (P<0.04) and 92% Positive Predictive Value (Meghan)1.  Increased immature granulocytes  assist with the detection of infection and/or inflammation and may be present at an early stage  and are more sensitive and specific than band counts.  NEUTROPHILS 02/20/2018 54.8  34 - 64 % Final    LYMPHOCYTES 02/20/2018 33.4  28 - 48 % Final    MONOCYTES 02/20/2018 7.1  1 - 13 % Final    EOSINOPHILS 02/20/2018 3.6  0 - 5 % Final    BASOPHILS 02/20/2018 0.4  0 - 3 % Final    Sodium 02/20/2018 140  136 - 145 mEq/L Final    Potassium 02/20/2018 4.0  3.5 - 5.1 mEq/L Final    Chloride 02/20/2018 101  98 - 107 mEq/L Final    CO2 02/20/2018 34* 21 - 32 mEq/L Final    Glucose 02/20/2018 120* 74 - 106 mg/dl Final    BUN 02/20/2018 18  7 - 25 mg/dl Final    Creatinine 02/20/2018 0.8  0.6 - 1.3 mg/dl Final    GFR est AA 02/20/2018 >60    Final    Comment: THE [facility] [employer] STATES THAT THE MDRD STUDY EQUATION SHOULD ONLY BE USED ON  INDIVIDUALS 18 OR OLDER. THE REPORT ALSO NOTES THAT THE MDRD STUDY EQUATION HAS NOT BEEN  VALIDATED FOR USE WITH THE ELDERLY (OVER 79YEARS OF AGE), PREGNANT WOMEN, PATIENTS WITH SERIOUS  COMORBID CONDITIONS, OR PERSONS WITH EXTREMES OF BODY SIZE, MUSCLE MASS, OR NUTRITIONAL STATUS. APPLICATION OF THE EQUATION TO THESE PATIENT GROUPS MAY LEAD TO ERRORS IN GFR ESTIMATION. GFR  ESTIMATING EQUATIONS HAVE POORER AGREEMENT WITH MEASURED GFR FOR ILL HOSPITALIZED PATIENTS AND  FOR PEOPLE WITH NEAR NORMAL KIDNEY FUNCTION THAN FOR SUBJECTS IN THE MDRD STUDY. VALIDATION  STUDIES ARE IN PROGRESS TO EVALUATE THE MDRD STUDY EQUATION FOR ADDITIONAL ETHNIC GROUPS, THE  ELDERLY, VARIOUS DISEASE CONDITIONS, AND PEOPLE WITH NORMAL KIDNEY FUNCTION. GFRA----REFERS TO   GFRO---REFERS TO OTHER RACES  REFERENCES AVAILABLE UPON REQUEST.      GFR est non-AA 02/20/2018 >60    Final    Calcium 02/20/2018 9.4  8.5 - 10.1 mg/dl Final    AST (SGOT) 02/20/2018 13* 15 - 37 U/L Final    ALT (SGPT) 02/20/2018 13  12 - 78 U/L Final    Alk.  phosphatase 02/20/2018 93  45 - 117 U/L Final    Bilirubin, total 02/20/2018 0.7  0.2 - 1.0 mg/dl Final  Protein, total 02/20/2018 6.9  6.4 - 8.2 gm/dl Final    Albumin 02/20/2018 3.5  3.4 - 5.0 gm/dl Final    Lipase 02/20/2018 123  73 - 393 U/L Final    Magnesium 02/20/2018 2.1  1.6 - 2.6 mg/dl Final    Sodium 02/20/2018 140  136 - 145 mEq/L Final    Potassium 02/20/2018 4.1  3.5 - 4.9 mEq/L Final    Chloride 02/20/2018 99  98 - 107 mEq/L Final    CO2, TOTAL 02/20/2018 34* 21 - 32 mmol/L Final    Glucose 02/20/2018 124* 74 - 106 mg/dL Final    BUN 02/20/2018 19  7 - 25 mg/dl Final    Creatinine 02/20/2018 0.8  0.6 - 1.3 mg/dl Final    HCT 02/20/2018 44  40 - 54 % Final    HGB 02/20/2018 15.0  12.4 - 17.2 gm/dl Final    CALCIUM,IONIZED 02/20/2018 4.70  4.40 - 5.40 mg/dL Final   Hospital Outpatient Visit on 12/29/2017   Component Date Value Ref Range Status    WBC 12/29/2017 5.7  4.6 - 13.2 K/uL Final    RBC 12/29/2017 4.60* 4.70 - 5.50 M/uL Final    HGB 12/29/2017 13.5  13.0 - 16.0 g/dL Final    HCT 12/29/2017 42.3  36.0 - 48.0 % Final    MCV 12/29/2017 92.0  74.0 - 97.0 FL Final    MCH 12/29/2017 29.3  24.0 - 34.0 PG Final    MCHC 12/29/2017 31.9  31.0 - 37.0 g/dL Final    RDW 12/29/2017 14.0  11.6 - 14.5 % Final    PLATELET 96/80/3090 459  135 - 420 K/uL Final    MPV 12/29/2017 11.6  9.2 - 11.8 FL Final    NEUTROPHILS 12/29/2017 45  40 - 73 % Final    LYMPHOCYTES 12/29/2017 41  21 - 52 % Final    MONOCYTES 12/29/2017 9  3 - 10 % Final    EOSINOPHILS 12/29/2017 5  0 - 5 % Final    BASOPHILS 12/29/2017 0  0 - 2 % Final    ABS. NEUTROPHILS 12/29/2017 2.6  1.8 - 8.0 K/UL Final    ABS. LYMPHOCYTES 12/29/2017 2.3  0.9 - 3.6 K/UL Final    ABS. MONOCYTES 12/29/2017 0.5  0.05 - 1.2 K/UL Final    ABS. EOSINOPHILS 12/29/2017 0.3  0.0 - 0.4 K/UL Final    ABS.  BASOPHILS 12/29/2017 0.0  0.0 - 0.06 K/UL Final    DF 12/29/2017 AUTOMATED    Final    Sodium 12/29/2017 141  136 - 145 mmol/L Final    Potassium 12/29/2017 4.7  3.5 - 5.5 mmol/L Final    Chloride 12/29/2017 103  100 - 108 mmol/L Final    CO2 12/29/2017 34* 21 - 32 mmol/L Final    Anion gap 12/29/2017 4  3.0 - 18 mmol/L Final    Glucose 12/29/2017 110* 74 - 99 mg/dL Final    BUN 12/29/2017 18  7.0 - 18 MG/DL Final    Creatinine 12/29/2017 0.71  0.6 - 1.3 MG/DL Final    BUN/Creatinine ratio 12/29/2017 25* 12 - 20   Final    GFR est AA 12/29/2017 >60  >60 ml/min/1.73m2 Final    GFR est non-AA 12/29/2017 >60  >60 ml/min/1.73m2 Final    Comment: (NOTE)  Estimated GFR is calculated using the Modification of Diet in Renal   Disease (MDRD) Study equation, reported for both  Americans   (GFRAA) and non- Americans (GFRNA), and normalized to 1.73m2   body surface area. The physician must decide which value applies to   the patient. The MDRD study equation should only be used in   individuals age 25 or older. It has not been validated for the   following: pregnant women, patients with serious comorbid conditions,   or on certain medications, or persons with extremes of body size,   muscle mass, or nutritional status.  Calcium 12/29/2017 9.2  8.5 - 10.1 MG/DL Final    Bilirubin, total 12/29/2017 0.8  0.2 - 1.0 MG/DL Final    ALT (SGPT) 12/29/2017 55  16 - 61 U/L Final    AST (SGOT) 12/29/2017 26  15 - 37 U/L Final    Alk.  phosphatase 12/29/2017 135* 45 - 117 U/L Final    Protein, total 12/29/2017 6.5  6.4 - 8.2 g/dL Final    Albumin 12/29/2017 3.8  3.4 - 5.0 g/dL Final    Globulin 12/29/2017 2.7  2.0 - 4.0 g/dL Final    A-G Ratio 12/29/2017 1.4  0.8 - 1.7   Final       .  Results for orders placed or performed during the hospital encounter of 03/21/18   CBC WITH AUTOMATED DIFF   Result Value Ref Range    WBC 8.3 4.6 - 13.2 K/uL    RBC 4.20 (L) 4.70 - 5.50 M/uL    HGB 12.6 (L) 13.0 - 16.0 g/dL    HCT 38.3 36.0 - 48.0 %    MCV 91.2 74.0 - 97.0 FL    MCH 30.0 24.0 - 34.0 PG    MCHC 32.9 31.0 - 37.0 g/dL    RDW 14.6 (H) 11.6 - 14.5 %    PLATELET 796 312 - 201 K/uL    MPV 11.1 9.2 - 11.8 FL    NEUTROPHILS 57 40 - 73 % LYMPHOCYTES 29 21 - 52 %    MONOCYTES 10 3 - 10 %    EOSINOPHILS 4 0 - 5 %    BASOPHILS 0 0 - 2 %    ABS. NEUTROPHILS 4.8 1.8 - 8.0 K/UL    ABS. LYMPHOCYTES 2.4 0.9 - 3.6 K/UL    ABS. MONOCYTES 0.8 0.05 - 1.2 K/UL    ABS. EOSINOPHILS 0.3 0.0 - 0.4 K/UL    ABS. BASOPHILS 0.0 0.0 - 0.06 K/UL    DF AUTOMATED     METABOLIC PANEL, COMPREHENSIVE   Result Value Ref Range    Sodium 138 136 - 145 mmol/L    Potassium 4.5 3.5 - 5.5 mmol/L    Chloride 101 100 - 108 mmol/L    CO2 30 21 - 32 mmol/L    Anion gap 7 3.0 - 18 mmol/L    Glucose 122 (H) 74 - 99 mg/dL    BUN 17 7.0 - 18 MG/DL    Creatinine 0.70 0.6 - 1.3 MG/DL    BUN/Creatinine ratio 24 (H) 12 - 20      GFR est AA >60 >60 ml/min/1.73m2    GFR est non-AA >60 >60 ml/min/1.73m2    Calcium 8.8 8.5 - 10.1 MG/DL    Bilirubin, total 0.3 0.2 - 1.0 MG/DL    ALT (SGPT) 21 16 - 61 U/L    AST (SGOT) 8 (L) 15 - 37 U/L    Alk. phosphatase 76 45 - 117 U/L    Protein, total 6.0 (L) 6.4 - 8.2 g/dL    Albumin 3.2 (L) 3.4 - 5.0 g/dL    Globulin 2.8 2.0 - 4.0 g/dL    A-G Ratio 1.1 0.8 - 1.7           Assessment / Plan:        ICD-10-CM ICD-9-CM    1. Sepsis, due to unspecified organism ([facility] A41.9 038.9 CBC WITH AUTOMATED DIFF     524.83 METABOLIC PANEL, COMPREHENSIVE      URINALYSIS W/MICROSCOPIC   2. Pneumonia of left lower lobe due to infectious organism ([facility] J18.1 486 CBC WITH AUTOMATED DIFF      METABOLIC PANEL, COMPREHENSIVE      URINALYSIS W/MICROSCOPIC   3. Gallstones K80.20 574.20 CBC WITH AUTOMATED DIFF      METABOLIC PANEL, COMPREHENSIVE      URINALYSIS W/MICROSCOPIC   4. Chronic cholecystitis K81.1 575.11 CBC WITH AUTOMATED DIFF      METABOLIC PANEL, COMPREHENSIVE      URINALYSIS W/MICROSCOPIC   5.  Urinary frequency R35.0 788.41 CBC WITH AUTOMATED DIFF      METABOLIC PANEL, COMPREHENSIVE      URINALYSIS W/MICROSCOPIC        Labs ordered  Will discontinue Actigall  he was advised to continue his maintenance medications    Medications were reconciled with the patient and the hospital record during this visit      Follow-up Disposition:  Return in about 4 weeks (around 4/18/2018). I asked Suze Lewis if he has any questions and I answered the questions. Suze Joyce. states that he understands the treatment plan and agrees with the treatment plan  .

## 2018-03-26 NOTE — TELEPHONE ENCOUNTER
Patient's wife was notified that Dr. Alix Russell would like for Mr. Mary Will start taking the Lasix and let us know if he continues to have swelling.

## 2018-03-26 NOTE — TELEPHONE ENCOUNTER
Patient's wife called to report patient's feet are swollen and painful to touch. Hospital discontinued his lasix recent, should he start taking it again? He is scheduled to see Dr. Kelvin Castano on Friday, 3/30. Please advise.

## 2018-03-27 NOTE — TELEPHONE ENCOUNTER
Mrs Cristopher Mcnamara stated she had received message to restart lasix already and that she would contact office if she needed any further assistance.

## 2018-03-28 NOTE — TELEPHONE ENCOUNTER
Dr Theo Nichols spoke with [facility] Karan Mo nurse and recommended he be transported to the emergency room. She expressed understanding.

## 2018-03-28 NOTE — TELEPHONE ENCOUNTER
Home health nurse reports patient has a fever of 100.5. His back pain is also worse than normal  She has just administered his percocet, which is prescribed by Dr. Elvis Tarango with Pain Management. Please advise as soon as possible, nurse is waiting for call back.

## 2018-03-29 NOTE — TELEPHONE ENCOUNTER
Requested Prescriptions     Pending Prescriptions Disp Refills    haloperidol (HALDOL) 0.5 mg tablet 30 Tab 2

## 2018-03-30 NOTE — PROGRESS NOTES
The patient presents to the office today with the chief complaint of fever    HPI    2 days ago the patient developed chills, fever and an acutely altered mental status. He was seen at [facility] ER with a negative work up. The pateint is doing some better but he continues with a low grade fever. He remains confused. He has an intermittent cough      Review of Systems   Constitutional: Positive for chills and fever. Respiratory: Positive for cough. Negative for shortness of breath. Cardiovascular: Positive for leg swelling. Negative for chest pain. No Known Allergies    Current Outpatient Prescriptions   Medication Sig Dispense Refill    amoxicillin-clavulanate (AUGMENTIN) 875-125 mg per tablet 1 tablet twice per day with food 14 Tab 0    haloperidol (HALDOL) 0.5 mg tablet TAKE 1 TABLET BY MOUTH EVERY NIGHT AT BEDTIME 30 Tab 2    oxyCODONE-acetaminophen (PERCOCET) 5-325 mg per tablet Take 1 Tab by mouth every four (4) hours as needed for Pain.  donepezil (ARICEPT) 10 mg tablet Take 1 Tab by mouth nightly. 90 Tab 0    oxyCODONE ER (OXYCONTIN) 20 mg ER tablet Take 1 Tab by mouth every twelve (12) hours. Max Daily Amount: 40 mg. Indications: Chronic Pain 60 Tab 0    nitroglycerin (NITROSTAT) 0.4 mg SL tablet by SubLINGual route every five (5) minutes as needed for Chest Pain.  metoprolol tartrate (LOPRESSOR) 50 mg tablet TAKE 1 TABLET BY MOUTH 2 TIMES A DAY (Patient taking differently: TAKE 1/2 TABLET BY MOUTH 2 TIMES A DAY, reports 25mg BID) 60 Tab 11    allopurinol (ZYLOPRIM) 300 mg tablet TAKE 1 TABLET BY MOUTH ONCE DAILY 90 Tab 3    aspirin delayed-release 81 mg tablet Take 81 mg by mouth two (2) times a day.  MULTIVITAMINS W-MINERALS/LUT (CENTRUM SILVER PO) Take 1 Tab by mouth daily. Past Medical History:   Diagnosis Date    3-vessel coronary artery disease 02/13/2004    Tech limited. Low normal to mildly depressed LV systolic function. LVH. DDfx. LAE. PASP 25-30. 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 mLAD 100%. CX diffuse nonobstructive. OM1 patent stent. pRCA 100%. Dbl seq SVG to D1, OM1 100%. SVG to pRPDA 99% (3.5 x 23-mm Xience stent). Patent mid segment stent. LIMA to LAD ok.  Cardiac echocardiogram 11/16/2014    [facility]:  EF 55-60%. DDfx. Normal RVSP. Mild TR. Neg bubble study for atrial shunt. No significant valvular pathology.  Cardiac nuclear imaging test, high risk 06/15/2012    Lg area of mod mid to distal anterior ischemia. Lg area of inferior ischemia. EF 57%. Mild mid-distal anterior, inferior hypk.   High risk pharm stress test.    Cervical spine pain     Deviated septum     s/p corrective surgery 7/1/2010    Disc disease, degenerative, cervical     joint pain, back pain    Dysphagia, unspecified(787.20)     Essential hypertension, benign     Gout with other specified manifestations(274.89)     Head injury     frequent headaches    Hypercholesterolemia     Insomnia, unspecified     Lumbar canal stenosis     Meniere's disease     Myocardial infarction, anterior wall (HCC)     DIEGO (obstructive sleep apnea)     on CPAP    S/P diskectomy 7/5/07    cervical    Spinal stenosis, lumbar region, without neurogenic claudication     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     Unspecified sinusitis (chronic)     Ventral hernia, unspecified, without mention of obstruction or gangrene     S/p repair 6/2003       Past Surgical History:   Procedure Laterality Date    CARDIAC CATHETERIZATION  3/15/2017         CARDIAC SURG PROCEDURE UNLIST  1/04    angioplasty of a proximal left anterior descending    CARDIAC SURG PROCEDURE UNLIST  6/2/08    angioplasty and stenting of totally obstructed saphenous vein graft to the posterior descending artery    CORONARY ARTERY ANGIOGRAM  3/15/2017         HX APPENDECTOMY      HX CATARACT REMOVAL      bilateral    HX CERVICAL DISKECTOMY  7/07    and fusion    HX CORONARY ARTERY BYPASS GRAFT  4/14/94    X4, with LIMA to the LAD, double sequential to the diagonal and circumflex and single to the main right coronary artery    HX CORONARY STENT PLACEMENT  6/20/2012    HX GI  1992    two feet of colon removed    HX HEART CATHETERIZATION  6/02/08    HX HEART CATHETERIZATION  6/20/2012    HX HEENT  7/1/10    deviated nasal septum surgery    HX HERNIA REPAIR  7/2003    two prior hernia repairs in the past    HX OTHER SURGICAL      HX PTCA  6/20/2012    HX TONSILLECTOMY      as a child    [address] [facility]      for crushed disk       Social History     Social History    Marital status:      Spouse name: N/A    Number of children: N/A    Years of education: N/A     Occupational History    Not on file. Social History Main Topics    Smoking status: Never Smoker    Smokeless tobacco: Never Used    Alcohol use Yes    Drug use: No    Sexual activity: No     Other Topics Concern    Not on file     Social History Narrative       Patient does have an advanced directive on file    Visit Vitals    /60 (BP 1 Location: Left arm, BP Patient Position: Sitting)    Pulse 63    Temp 96.8 °F (36 °C) (Tympanic)    Resp 16    Ht 5' 11\" (1.803 m)    Wt 163 lb (73.9 kg)  Comment: patient reported    SpO2 94%    BMI 22.73 kg/m2       Physical Exam   Constitutional:   Patient appears chronically ill. He seemed oblivious to his surroundings. He did not speak during the visit   Neck: Carotid bruit is not present. Cardiovascular: Normal rate and regular rhythm. Exam reveals no gallop. No murmur heard. Pulmonary/Chest: He has no wheezes. He has rales (Coarse rales right lower lobe. Left side is  normal). Abdominal: Soft. He exhibits no distension. There is no tenderness. Musculoskeletal: He exhibits edema (1+ swelling of both legs). BMI:  OK    Admission on 03/28/2018, Discharged on 03/28/2018   Component Date Value Ref Range Status    WBC 03/28/2018 9.6  4.0 - 11.0 1000/mm3 Final    RBC 03/28/2018 4.02  3.80 - 5.70 M/uL Final    HGB 03/28/2018 12.0* 12.4 - 17.2 gm/dl Final    HCT 03/28/2018 36.4* 37.0 - 50.0 % Final    MCV 03/28/2018 90.5  80.0 - 98.0 fL Final    MCH 03/28/2018 29.9  23.0 - 34.6 pg Final    MCHC 03/28/2018 33.0  30.0 - 36.0 gm/dl Final    PLATELET 76/66/4121 004  140 - 450 1000/mm3 Final    MPV 03/28/2018 10.8* 6.0 - 10.0 fL Final    RDW-SD 03/28/2018 46.8* 35.1 - 43.9   Final    NRBC 03/28/2018 0  0 - 0   Final    IMMATURE GRANULOCYTES 03/28/2018 0.5  0.0 - 3.0 % Final    Comment: IG - Immature granulocytes (promyelocytes + myelocytes + metamyelocytes), their presence  indicates a left shift. An IG > 3% may predict positive blood cultures with 98% specificity.  (P<0.04) and 92% Positive Predictive Value (Kelsey-Jacquie)1. Increased immature granulocytes  assist with the detection of infection and/or inflammation and may be present at an early stage  and are more sensitive and specific than band counts.       NEUTROPHILS 03/28/2018 78.8* 34 - 64 % Final    LYMPHOCYTES 03/28/2018 12.7* 28 - 48 % Final    MONOCYTES 03/28/2018 7.2  1 - 13 % Final    EOSINOPHILS 03/28/2018 0.4  0 - 5 % Final    BASOPHILS 03/28/2018 0.4  0 - 3 % Final    Sodium 03/28/2018 138  136 - 145 mEq/L Final    Potassium 03/28/2018 4.2  3.5 - 5.1 mEq/L Final    Chloride 03/28/2018 100  98 - 107 mEq/L Final    CO2 03/28/2018 31  21 - 32 mEq/L Final    Glucose 03/28/2018 119* 74 - 106 mg/dl Final    BUN 03/28/2018 14  7 - 25 mg/dl Final    Creatinine 03/28/2018 0.6  0.6 - 1.3 mg/dl Final    GFR est AA 03/28/2018 >60    Final    Comment: THE [employer]AT[employer] WORKING GROUP STATES THAT THE MDRD STUDY EQUATION SHOULD ONLY BE USED ON  INDIVIDUALS 18 OR OLDER. THE REPORT ALSO NOTES THAT THE MDRD STUDY EQUATION HAS NOT BEEN  VALIDATED FOR USE WITH THE ELDERLY (OVER 79YEARS OF AGE), PREGNANT WOMEN, PATIENTS WITH SERIOUS  COMORBID CONDITIONS, OR PERSONS WITH EXTREMES OF BODY SIZE, MUSCLE MASS, OR NUTRITIONAL STATUS. APPLICATION OF THE EQUATION TO THESE PATIENT GROUPS MAY LEAD TO ERRORS IN GFR ESTIMATION. GFR  ESTIMATING EQUATIONS HAVE POORER AGREEMENT WITH MEASURED GFR FOR ILL HOSPITALIZED PATIENTS AND  FOR PEOPLE WITH NEAR NORMAL KIDNEY FUNCTION THAN FOR SUBJECTS IN THE MDRD STUDY. VALIDATION  STUDIES ARE IN PROGRESS TO EVALUATE THE MDRD STUDY EQUATION FOR ADDITIONAL ETHNIC GROUPS, THE  ELDERLY, VARIOUS DISEASE CONDITIONS, AND PEOPLE WITH NORMAL KIDNEY FUNCTION. GFRA----REFERS TO   GFRO---REFERS TO OTHER RACES  REFERENCES AVAILABLE UPON REQUEST.      GFR est non-AA 03/28/2018 >60    Final    Calcium 03/28/2018 9.2  8.5 - 10.1 mg/dl Final    AST (SGOT) 03/28/2018 20  15 - 37 U/L Final    ALT (SGPT) 03/28/2018 28  12 - 78 U/L Final    Alk.  phosphatase 03/28/2018 112  45 - 117 U/L Final    Bilirubin, total 03/28/2018 0.7  0.2 - 1.0 mg/dl Final    Protein, total 03/28/2018 6.8  6.4 - 8.2 gm/dl Final    Albumin 03/28/2018 3.1* 3.4 - 5.0 gm/dl Final    Lipase 03/28/2018 45* 73 - 393 U/L Final    Lactic Acid 03/28/2018 1.0  0.4 - 2.0 mmol/L Final    Ventricular Rate 03/28/2018 79  BPM Final    Atrial Rate 03/28/2018 79  BPM Final    P-R Interval 03/28/2018 168  ms Final    QRS Duration 03/28/2018 134  ms Final    Q-T Interval 03/28/2018 406  ms Final    QTC Calculation (Bezet) 03/28/2018 465  ms Final    Calculated P Axis 03/28/2018 62  degrees Final    Calculated R Axis 03/28/2018 -12  degrees Final    Calculated T Axis 03/28/2018 68  degrees Final    Diagnosis 03/28/2018    Final                    Value:Normal sinus rhythm  Right bundle branch block  Abnormal ECG  When compared with ECG of 10-MAR-2018 18:05,  No significant change was found  Confirmed by Faith Chawla M.D., Kimberlee Dean ([id number]) on 3/28/2018 4:59:25 PM      Blood Culture Result 03/28/2018 No Growth At 24 Hours    Incomplete    Blood Culture Result 03/28/2018 No Growth At 24 Hours    Incomplete    Culture result 03/28/2018 No Growth    Final    Troponin-I 03/28/2018 0.00  0.00 - 0.07 ng/ml Final    Glucose 03/28/2018 Negative  NEGATIVE,Negative mg/dl Final    Bilirubin 03/28/2018 Small* NEGATIVE,Negative   Final    Ketone 03/28/2018 Trace* NEGATIVE,Negative mg/dl Final    Specific gravity 03/28/2018 1.020  1.005 - 1.030   Final    Blood 03/28/2018 Large* NEGATIVE,Negative   Final    pH (UA) 03/28/2018 5.5  5 - 9   Final    Protein 03/28/2018 30* NEGATIVE,Negative mg/dl Final    Urobilinogen 03/28/2018 0.2  0.0 - 1.0 EU/dl Final    Nitrites 03/28/2018 Negative  NEGATIVE,Negative   Final    Leukocyte Esterase 03/28/2018 Negative  NEGATIVE,Negative   Final    Color 03/28/2018 Red    Final    Appearance 03/28/2018 Slightly Cloudy    Final    : Nurme 2 Outpatient Visit on 03/21/2018   Component Date Value Ref Range Status    WBC 03/21/2018 8.3  4.6 - 13.2 K/uL Final    RBC 03/21/2018 4.20* 4.70 - 5.50 M/uL Final    HGB 03/21/2018 12.6* 13.0 - 16.0 g/dL Final    HCT 03/21/2018 38.3  36.0 - 48.0 % Final    MCV 03/21/2018 91.2  74.0 - 97.0 FL Final    MCH 03/21/2018 30.0  24.0 - 34.0 PG Final    MCHC 03/21/2018 32.9  31.0 - 37.0 g/dL Final    RDW 03/21/2018 14.6* 11.6 - 14.5 % Final    PLATELET 57/69/4835 816  135 - 420 K/uL Final    MPV 03/21/2018 11.1  9.2 - 11.8 FL Final    NEUTROPHILS 03/21/2018 57  40 - 73 % Final    LYMPHOCYTES 03/21/2018 29  21 - 52 % Final    MONOCYTES 03/21/2018 10  3 - 10 % Final    EOSINOPHILS 03/21/2018 4  0 - 5 % Final    BASOPHILS 03/21/2018 0  0 - 2 % Final    ABS. NEUTROPHILS 03/21/2018 4.8  1.8 - 8.0 K/UL Final    ABS. LYMPHOCYTES 03/21/2018 2.4  0.9 - 3.6 K/UL Final    ABS.  MONOCYTES 03/21/2018 0.8  0.05 - 1.2 K/UL Final    ABS. EOSINOPHILS 03/21/2018 0.3  0.0 - 0.4 K/UL Final    ABS. BASOPHILS 03/21/2018 0.0  0.0 - 0.06 K/UL Final    DF 03/21/2018 AUTOMATED    Final    Sodium 03/21/2018 138  136 - 145 mmol/L Final    Potassium 03/21/2018 4.5  3.5 - 5.5 mmol/L Final    Chloride 03/21/2018 101  100 - 108 mmol/L Final    CO2 03/21/2018 30  21 - 32 mmol/L Final    Anion gap 03/21/2018 7  3.0 - 18 mmol/L Final    Glucose 03/21/2018 122* 74 - 99 mg/dL Final    BUN 03/21/2018 17  7.0 - 18 MG/DL Final    Creatinine 03/21/2018 0.70  0.6 - 1.3 MG/DL Final    BUN/Creatinine ratio 03/21/2018 24* 12 - 20   Final    GFR est AA 03/21/2018 >60  >60 ml/min/1.73m2 Final    GFR est non-AA 03/21/2018 >60  >60 ml/min/1.73m2 Final    Comment: (NOTE)  Estimated GFR is calculated using the Modification of Diet in Renal   Disease (MDRD) Study equation, reported for both  Americans   (GFRAA) and non- Americans (GFRNA), and normalized to 1.73m2   body surface area. The physician must decide which value applies to   the patient. The MDRD study equation should only be used in   individuals age 25 or older. It has not been validated for the   following: pregnant women, patients with serious comorbid conditions,   or on certain medications, or persons with extremes of body size,   muscle mass, or nutritional status.  Calcium 03/21/2018 8.8  8.5 - 10.1 MG/DL Final    Bilirubin, total 03/21/2018 0.3  0.2 - 1.0 MG/DL Final    ALT (SGPT) 03/21/2018 21  16 - 61 U/L Final    AST (SGOT) 03/21/2018 8* 15 - 37 U/L Final    Alk. phosphatase 03/21/2018 76  45 - 117 U/L Final    Protein, total 03/21/2018 6.0* 6.4 - 8.2 g/dL Final    Albumin 03/21/2018 3.2* 3.4 - 5.0 g/dL Final    Globulin 03/21/2018 2.8  2.0 - 4.0 g/dL Final    A-G Ratio 03/21/2018 1.1  0.8 - 1.7   Final   Admission on 03/10/2018, Discharged on 03/15/2018   No results displayed because visit has over 200 results.       Admission on 02/20/2018, Discharged on 02/20/2018   Component Date Value Ref Range Status    WBC 02/20/2018 5.6  4.0 - 11.0 1000/mm3 Final    RBC 02/20/2018 4.67  3.80 - 5.70 M/uL Final    HGB 02/20/2018 13.9  12.4 - 17.2 gm/dl Final    HCT 02/20/2018 43.0  37.0 - 50.0 % Final    MCV 02/20/2018 92.1  80.0 - 98.0 fL Final    MCH 02/20/2018 29.8  23.0 - 34.6 pg Final    MCHC 02/20/2018 32.3  30.0 - 36.0 gm/dl Final    PLATELET 80/91/3406 454  140 - 450 1000/mm3 Final    MPV 02/20/2018 10.7* 6.0 - 10.0 fL Final    RDW-SD 02/20/2018 45.9* 35.1 - 43.9   Final    NRBC 02/20/2018 0  0 - 0   Final    IMMATURE GRANULOCYTES 02/20/2018 0.7  0.0 - 3.0 % Final    Comment: IG - Immature granulocytes (promyelocytes + myelocytes + metamyelocytes), their presence  indicates a left shift. An IG > 3% may predict positive blood cultures with 98% specificity.  (P<0.04) and 92% Positive Predictive Value (Meghan)1. Increased immature granulocytes  assist with the detection of infection and/or inflammation and may be present at an early stage  and are more sensitive and specific than band counts.  NEUTROPHILS 02/20/2018 54.8  34 - 64 % Final    LYMPHOCYTES 02/20/2018 33.4  28 - 48 % Final    MONOCYTES 02/20/2018 7.1  1 - 13 % Final    EOSINOPHILS 02/20/2018 3.6  0 - 5 % Final    BASOPHILS 02/20/2018 0.4  0 - 3 % Final    Sodium 02/20/2018 140  136 - 145 mEq/L Final    Potassium 02/20/2018 4.0  3.5 - 5.1 mEq/L Final    Chloride 02/20/2018 101  98 - 107 mEq/L Final    CO2 02/20/2018 34* 21 - 32 mEq/L Final    Glucose 02/20/2018 120* 74 - 106 mg/dl Final    BUN 02/20/2018 18  7 - 25 mg/dl Final    Creatinine 02/20/2018 0.8  0.6 - 1.3 mg/dl Final    GFR est AA 02/20/2018 >60    Final    Comment: THE [employer]ORAT[employer] WORKING GROUP STATES THAT THE MDRD STUDY EQUATION SHOULD ONLY BE USED ON  INDIVIDUALS 18 OR OLDER.  THE REPORT ALSO NOTES THAT THE MDRD STUDY EQUATION HAS NOT BEEN  VALIDATED FOR USE WITH THE ELDERLY (OVER 79YEARS OF AGE), PREGNANT WOMEN, PATIENTS WITH SERIOUS  COMORBID CONDITIONS, OR PERSONS WITH EXTREMES OF BODY SIZE, MUSCLE MASS, OR NUTRITIONAL STATUS. APPLICATION OF THE EQUATION TO THESE PATIENT GROUPS MAY LEAD TO ERRORS IN GFR ESTIMATION. GFR  ESTIMATING EQUATIONS HAVE POORER AGREEMENT WITH MEASURED GFR FOR ILL HOSPITALIZED PATIENTS AND  FOR PEOPLE WITH NEAR NORMAL KIDNEY FUNCTION THAN FOR SUBJECTS IN THE MDRD STUDY. VALIDATION  STUDIES ARE IN PROGRESS TO EVALUATE THE MDRD STUDY EQUATION FOR ADDITIONAL ETHNIC GROUPS, THE  ELDERLY, VARIOUS DISEASE CONDITIONS, AND PEOPLE WITH NORMAL KIDNEY FUNCTION. GFRA----REFERS TO   GFRO---REFERS TO OTHER RACES  REFERENCES AVAILABLE UPON REQUEST.      GFR est non-AA 02/20/2018 >60    Final    Calcium 02/20/2018 9.4  8.5 - 10.1 mg/dl Final    AST (SGOT) 02/20/2018 13* 15 - 37 U/L Final    ALT (SGPT) 02/20/2018 13  12 - 78 U/L Final    Alk. phosphatase 02/20/2018 93  45 - 117 U/L Final    Bilirubin, total 02/20/2018 0.7  0.2 - 1.0 mg/dl Final    Protein, total 02/20/2018 6.9  6.4 - 8.2 gm/dl Final    Albumin 02/20/2018 3.5  3.4 - 5.0 gm/dl Final    Lipase 02/20/2018 123  73 - 393 U/L Final    Magnesium 02/20/2018 2.1  1.6 - 2.6 mg/dl Final    Sodium 02/20/2018 140  136 - 145 mEq/L Final    Potassium 02/20/2018 4.1  3.5 - 4.9 mEq/L Final    Chloride 02/20/2018 99  98 - 107 mEq/L Final    CO2, TOTAL 02/20/2018 34* 21 - 32 mmol/L Final    Glucose 02/20/2018 124* 74 - 106 mg/dL Final    BUN 02/20/2018 19  7 - 25 mg/dl Final    Creatinine 02/20/2018 0.8  0.6 - 1.3 mg/dl Final    HCT 02/20/2018 44  40 - 54 % Final    HGB 02/20/2018 15.0  12.4 - 17.2 gm/dl Final    CALCIUM,IONIZED 02/20/2018 4.70  4.40 - 5.40 mg/dL Final       .No results found for any visits on 03/30/18. Assessment / Plan      ICD-10-CM ICD-9-CM    1. Fever of unknown origin (FUO) R50.9 780.60    2. Essential hypertension, benign I10 401.1    3.  Encephalopathy G93.40 348.30 Augmentin  Continue Lasix and IVY:  he was advised to continue his maintenance medications      Follow-up Disposition:  Return in about 12 days (around 4/11/2018). I asked Suze Guajardo. if he has any questions and I answered the questions. Suze Guajardo. states that he understands the treatment plan and agrees with the treatment plan

## 2018-03-30 NOTE — PROGRESS NOTES
Transitions of Care Coordination    Ulices Olguin was hospitalized at [facility] 3/10-3/15/18 for PNA, sepsis, AMS and discharged to home with [facility] home care. The patient was seen in [facility] ED on 3/28/18 for a fever and discharged to home. Labs reviewed. 3/28/2018  3:37 PM - Pedro, [facility]   [employer] Value Flag Ref Range Units Status   Lactic Acid 1.0  0.4 - 2.0 mmol/L Final     3/28/2018  5:26 PM - Pedro, [facility] Value Flag Ref Range Units Status   Glucose Negative  NEGATIVE,Negative mg/dl Final   Bilirubin Small (A) NEGATIVE,Negative   Final   Ketone Trace (A) NEGATIVE,Negative mg/dl Final   Specific gravity 1.020  1.005 - 1.030   Final   Blood Large (A) NEGATIVE,Negative   Final   pH (UA) 5.5  5 - 9   Final   Protein 30 (A) NEGATIVE,Negative mg/dl Final   Urobilinogen 0.2  0.0 - 1.0 EU/dl Final   Nitrites Negative  NEGATIVE,Negative   Final   Leukocyte Esterase Negative  NEGATIVE,Negative   Final   Color Red     Final   Appearance Slightly Cloudy         3/30/2018  7:37 AM - Pedro, [facility]   [facility] Value Ref Range & Units Status   Culture result No Growth   Final   Result History   CULTURE, URINE on 3/30/2018       The patient attended an appointment with Dr. Elida Toth today, 3/30/18. Per visit note patient developed chills, fever and AMS on 3/28/18 and was sent to the ED by the home care nurse. Per visit note patient still with chills. Low grade fever, cough, leg swelling, confusion at baseline. Augmentin was prescribed. Patient was advised to continue Lasix, potassium and maintenance medication. RTC on 4/12/18. Will follow.

## 2018-04-02 NOTE — PROGRESS NOTES
Transitions of Care Coordination    Mamie Toribio was hospitalized at [facility] 3/10-3/15/18 for PNA, sepsis, AMS and discharged to home with [facility] home care.     The patient was seen in [facility] ED on 3/28/18 for a fever and discharged to home. Labs reviewed. Call to home number. Wife away from home. Spoke with family friend (staying with patient) who volunteered that the patient is doing well today. No fever now. Eating well. Taking Augmentin as prescribed by Dr. Preeti Davies on 3/30/18. Advised to have wife call with any questions or concerns. Will follow. Goals Addressed             Most Recent       Post Hospitalization     Attends follow-up appointments as directed. On track (3/22/2018)             Plan:  Monitor for attendance at apts and assist as needed to schedule.   3/21/18, 3/30/18-Attended apt with Dr. Preeti Davies

## 2018-04-09 NOTE — TELEPHONE ENCOUNTER
Home Health nurse reports patient has drain tube, sutures are not at the site, they are furthur down the tube. She thinks the tube has migrated out some. She says it is still draining. Please advise.

## 2018-04-13 NOTE — PROGRESS NOTES
Hospital Discharge Follow Up    Elsa Lanza was hospitalized at [facility] 3/10-3/15/18 for PNA, sepsis, AMS and discharged to home with [facility] home care. The patient attended an office visit today with Dr. Nona Dawkins. Pain medications adjusted. Goals Addressed             Most Recent       Post Hospitalization     Attends follow-up appointments as directed. On track (4/13/2018)             Plan:  Monitor for attendance at apts and assist as needed to schedule.   3/21/18, 3/30/18, 4/13/18-Attended apt with Dr. Nona Dawkins

## 2018-04-16 NOTE — PROGRESS NOTES
The patient presents to the office today with the chief complaint of pain    HPI    The patient of chronic severe pain. He has pain both in his back from advanced DISH in his back. The patient also has cholelithiasis with chronic cholecystitis. The patient is not a candidate for surgerydue to cardiac disease and his overall condition. He has chronic abdominal pain related to this. He also recurrent sepsis like syndromes related to this        Review of Systems   Respiratory: Negative for shortness of breath. Cardiovascular: Negative for chest pain and leg swelling. Gastrointestinal: Positive for abdominal pain. Musculoskeletal: Positive for back pain. No Known Allergies;    Current Outpatient Prescriptions   Medication Sig Dispense Refill    furosemide (LASIX) 20 mg tablet       oxyCODONE-acetaminophen (PERCOCET 7.5) 7.5-325 mg per tablet 1 tab every six hours as needed for pain 120 Tab 0    oxyCODONE ER (OXYCONTIN) 10 mg ER tablet Take 1 Tab by mouth every twelve (12) hours. Max Daily Amount: 20 mg. 60 Tab 0    potassium chloride SR (KLOR-CON 10) 10 mEq tablet TAKE 1 TABLET BY MOUTH 2 TIMES A DAY 60 Tab 1    haloperidol (HALDOL) 0.5 mg tablet TAKE 1 TABLET BY MOUTH EVERY NIGHT AT BEDTIME 30 Tab 2    donepezil (ARICEPT) 10 mg tablet Take 1 Tab by mouth nightly. 90 Tab 0    nitroglycerin (NITROSTAT) 0.4 mg SL tablet by SubLINGual route every five (5) minutes as needed for Chest Pain.  metoprolol tartrate (LOPRESSOR) 50 mg tablet TAKE 1 TABLET BY MOUTH 2 TIMES A DAY (Patient taking differently: TAKE 1/2 TABLET BY MOUTH 2 TIMES A DAY, reports 25mg BID) 60 Tab 11    allopurinol (ZYLOPRIM) 300 mg tablet TAKE 1 TABLET BY MOUTH ONCE DAILY 90 Tab 3    aspirin delayed-release 81 mg tablet Take 81 mg by mouth two (2) times a day.  MULTIVITAMINS W-MINERALS/LUT (CENTRUM SILVER PO) Take 1 Tab by mouth daily.       amoxicillin-clavulanate (AUGMENTIN) 875-125 mg per tablet 1 tablet twice per day with food 14 Tab 0       Past Medical History:   Diagnosis Date    3-vessel coronary artery disease 02/13/2004    Tech limited. Low normal to mildly depressed LV systolic function. LVH. DDfx. LAE. PASP 25-30. 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 mLAD 100%. CX diffuse nonobstructive. OM1 patent stent. pRCA 100%. Dbl seq SVG to D1, OM1 100%. SVG to pRPDA 99% (3.5 x 23-mm Xience stent). Patent mid segment stent. LIMA to LAD ok.  Cardiac echocardiogram 11/16/2014    [facility]:  EF 55-60%. DDfx. Normal RVSP. Mild TR. Neg bubble study for atrial shunt. No significant valvular pathology.  Cardiac nuclear imaging test, high risk 06/15/2012    Lg area of mod mid to distal anterior ischemia. Lg area of inferior ischemia. EF 57%. Mild mid-distal anterior, inferior hypk.   High risk pharm stress test.    Cervical spine pain     Deviated septum     s/p corrective surgery 7/1/2010    Disc disease, degenerative, cervical     joint pain, back pain    Dysphagia, unspecified(787.20)     Essential hypertension, benign     Gout with other specified manifestations(274.89)     Head injury     frequent headaches    Hypercholesterolemia     Insomnia, unspecified     Lumbar canal stenosis     Meniere's disease     Myocardial infarction, anterior wall (HCC)     DIEGO (obstructive sleep apnea)     on CPAP    S/P diskectomy 7/5/07    cervical    Spinal stenosis, lumbar region, without neurogenic claudication     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     Unspecified sinusitis (chronic)     Ventral hernia, unspecified, without mention of obstruction or gangrene     S/p repair 6/2003       Past Surgical History:   Procedure Laterality Date    CARDIAC CATHETERIZATION  3/15/2017         CARDIAC SURG PROCEDURE UNLIST  1/04    angioplasty of a proximal left anterior descending    CARDIAC SURG PROCEDURE UNLIST  6/2/08    angioplasty and stenting of totally obstructed saphenous vein graft to the posterior descending artery    CORONARY ARTERY ANGIOGRAM  3/15/2017         HX APPENDECTOMY      HX CATARACT REMOVAL      bilateral    HX CERVICAL DISKECTOMY  7/07    and fusion    HX CORONARY ARTERY BYPASS GRAFT  4/14/94    X4, with LIMA to the LAD, double sequential to the diagonal and circumflex and single to the main right coronary artery    HX CORONARY STENT PLACEMENT  6/20/2012    HX GI  1992    two feet of colon removed    HX HEART CATHETERIZATION  6/02/08    HX HEART CATHETERIZATION  6/20/2012    HX HEENT  7/1/10    deviated nasal septum surgery    HX HERNIA REPAIR  7/2003    two prior hernia repairs in the past    HX OTHER SURGICAL      HX PTCA  6/20/2012    HX TONSILLECTOMY      as a child    MT [address]      for crushed disk       Social History     Social History    Marital status:      Spouse name: N/A    Number of children: N/A    Years of education: N/A     Occupational History    Not on file. Social History Main Topics    Smoking status: Never Smoker    Smokeless tobacco: Never Used    Alcohol use Yes    Drug use: No    Sexual activity: No     Other Topics Concern    Not on file     Social History Narrative       Patient does have an advanced directive on file    Visit Vitals    /58 (BP 1 Location: Left arm, BP Patient Position: Sitting)    Pulse 63    Temp 97.4 °F (36.3 °C) (Tympanic)    Resp 16    Ht 5' 11\" (1.803 m)    Wt 163 lb (73.9 kg)    SpO2 96%    BMI 22.73 kg/m2       Physical Exam   Cardiovascular: Normal rate and regular rhythm. Exam reveals no gallop. No murmur heard. Pulmonary/Chest: He has no wheezes. He has no rales. Abdominal: Soft.  There is tenderness (Mid to right uupper quadrant without rebound). Musculoskeletal: He exhibits no edema. Neurological:   Greatly limited flexion       BMI:  O    Admission on 03/28/2018, Discharged on 03/28/2018   Component Date Value Ref Range Status    WBC 03/28/2018 9.6  4.0 - 11.0 1000/mm3 Final    RBC 03/28/2018 4.02  3.80 - 5.70 M/uL Final    HGB 03/28/2018 12.0* 12.4 - 17.2 gm/dl Final    HCT 03/28/2018 36.4* 37.0 - 50.0 % Final    MCV 03/28/2018 90.5  80.0 - 98.0 fL Final    MCH 03/28/2018 29.9  23.0 - 34.6 pg Final    MCHC 03/28/2018 33.0  30.0 - 36.0 gm/dl Final    PLATELET 40/81/1507 173  140 - 450 1000/mm3 Final    MPV 03/28/2018 10.8* 6.0 - 10.0 fL Final    RDW-SD 03/28/2018 46.8* 35.1 - 43.9   Final    NRBC 03/28/2018 0  0 - 0   Final    IMMATURE GRANULOCYTES 03/28/2018 0.5  0.0 - 3.0 % Final    Comment: IG - Immature granulocytes (promyelocytes + myelocytes + metamyelocytes), their presence  indicates a left shift. An IG > 3% may predict positive blood cultures with 98% specificity.  (P<0.04) and 92% Positive Predictive Value (Kelsey-Jacquie)1. Increased immature granulocytes  assist with the detection of infection and/or inflammation and may be present at an early stage  and are more sensitive and specific than band counts.       NEUTROPHILS 03/28/2018 78.8* 34 - 64 % Final    LYMPHOCYTES 03/28/2018 12.7* 28 - 48 % Final    MONOCYTES 03/28/2018 7.2  1 - 13 % Final    EOSINOPHILS 03/28/2018 0.4  0 - 5 % Final    BASOPHILS 03/28/2018 0.4  0 - 3 % Final    Sodium 03/28/2018 138  136 - 145 mEq/L Final    Potassium 03/28/2018 4.2  3.5 - 5.1 mEq/L Final    Chloride 03/28/2018 100  98 - 107 mEq/L Final    CO2 03/28/2018 31  21 - 32 mEq/L Final    Glucose 03/28/2018 119* 74 - 106 mg/dl Final    BUN 03/28/2018 14  7 - 25 mg/dl Final    Creatinine 03/28/2018 0.6  0.6 - 1.3 mg/dl Final    GFR est AA 03/28/2018 >60    Final    Comment: THE [employer] WORKING GROUP STATES THAT THE MDRD STUDY EQUATION SHOULD ONLY BE USED ON  INDIVIDUALS 18 OR OLDER. THE REPORT ALSO NOTES THAT THE MDRD STUDY EQUATION HAS NOT BEEN  VALIDATED FOR USE WITH THE ELDERLY (OVER 79YEARS OF AGE), PREGNANT WOMEN, PATIENTS WITH SERIOUS  COMORBID CONDITIONS, OR PERSONS WITH EXTREMES OF BODY SIZE, MUSCLE MASS, OR NUTRITIONAL STATUS. APPLICATION OF THE EQUATION TO THESE PATIENT GROUPS MAY LEAD TO ERRORS IN GFR ESTIMATION. GFR  ESTIMATING EQUATIONS HAVE POORER AGREEMENT WITH MEASURED GFR FOR ILL HOSPITALIZED PATIENTS AND  FOR PEOPLE WITH NEAR NORMAL KIDNEY FUNCTION THAN FOR SUBJECTS IN THE MDRD STUDY. VALIDATION  STUDIES ARE IN PROGRESS TO EVALUATE THE MDRD STUDY EQUATION FOR ADDITIONAL ETHNIC GROUPS, THE  ELDERLY, VARIOUS DISEASE CONDITIONS, AND PEOPLE WITH NORMAL KIDNEY FUNCTION. GFRA----REFERS TO   GFRO---REFERS TO OTHER RACES  REFERENCES AVAILABLE UPON REQUEST.      GFR est non-AA 03/28/2018 >60    Final    Calcium 03/28/2018 9.2  8.5 - 10.1 mg/dl Final    AST (SGOT) 03/28/2018 20  15 - 37 U/L Final    ALT (SGPT) 03/28/2018 28  12 - 78 U/L Final    Alk.  phosphatase 03/28/2018 112  45 - 117 U/L Final    Bilirubin, total 03/28/2018 0.7  0.2 - 1.0 mg/dl Final    Protein, total 03/28/2018 6.8  6.4 - 8.2 gm/dl Final    Albumin 03/28/2018 3.1* 3.4 - 5.0 gm/dl Final    Lipase 03/28/2018 45* 73 - 393 U/L Final    Lactic Acid 03/28/2018 1.0  0.4 - 2.0 mmol/L Final    Ventricular Rate 03/28/2018 79  BPM Final    Atrial Rate 03/28/2018 79  BPM Final    P-R Interval 03/28/2018 168  ms Final    QRS Duration 03/28/2018 134  ms Final    Q-T Interval 03/28/2018 406  ms Final    QTC Calculation (Bezet) 03/28/2018 465  ms Final    Calculated P Axis 03/28/2018 62  degrees Final    Calculated R Axis 03/28/2018 -12  degrees Final    Calculated T Axis 03/28/2018 68  degrees Final    Diagnosis 03/28/2018    Final                    Value:Normal sinus rhythm  Right bundle branch block  Abnormal ECG  When compared with ECG of 10-MAR-2018 18:05,  No significant change was found  Confirmed by Ulises Babin M.D., Gilbert Betts ([id number]) on 3/28/2018 4:59:25 PM      Blood Culture Result 03/28/2018 No Growth at 5 days    Final    Blood Culture Result 03/28/2018 No Growth at 5 days    Final    Culture result 03/28/2018 No Growth    Final    Troponin-I 03/28/2018 0.00  0.00 - 0.07 ng/ml Final    Glucose 03/28/2018 Negative  NEGATIVE,Negative mg/dl Final    Bilirubin 03/28/2018 Small* NEGATIVE,Negative   Final    Ketone 03/28/2018 Trace* NEGATIVE,Negative mg/dl Final    Specific gravity 03/28/2018 1.020  1.005 - 1.030   Final    Blood 03/28/2018 Large* NEGATIVE,Negative   Final    pH (UA) 03/28/2018 5.5  5 - 9   Final    Protein 03/28/2018 30* NEGATIVE,Negative mg/dl Final    Urobilinogen 03/28/2018 0.2  0.0 - 1.0 EU/dl Final    Nitrites 03/28/2018 Negative  NEGATIVE,Negative   Final    Leukocyte Esterase 03/28/2018 Negative  NEGATIVE,Negative   Final    Color 03/28/2018 Red    Final    Appearance 03/28/2018 Slightly Cloudy    Final    : Nurme 2 Outpatient Visit on 03/21/2018   Component Date Value Ref Range Status    WBC 03/21/2018 8.3  4.6 - 13.2 K/uL Final    RBC 03/21/2018 4.20* 4.70 - 5.50 M/uL Final    HGB 03/21/2018 12.6* 13.0 - 16.0 g/dL Final    HCT 03/21/2018 38.3  36.0 - 48.0 % Final    MCV 03/21/2018 91.2  74.0 - 97.0 FL Final    MCH 03/21/2018 30.0  24.0 - 34.0 PG Final    MCHC 03/21/2018 32.9  31.0 - 37.0 g/dL Final    RDW 03/21/2018 14.6* 11.6 - 14.5 % Final    PLATELET 52/10/5945 610  135 - 420 K/uL Final    MPV 03/21/2018 11.1  9.2 - 11.8 FL Final    NEUTROPHILS 03/21/2018 57  40 - 73 % Final    LYMPHOCYTES 03/21/2018 29  21 - 52 % Final    MONOCYTES 03/21/2018 10  3 - 10 % Final    EOSINOPHILS 03/21/2018 4  0 - 5 % Final    BASOPHILS 03/21/2018 0  0 - 2 % Final    ABS. NEUTROPHILS 03/21/2018 4.8  1.8 - 8.0 K/UL Final    ABS. LYMPHOCYTES 03/21/2018 2.4  0.9 - 3.6 K/UL Final    ABS.  MONOCYTES 03/21/2018 0.8  0.05 - 1.2 K/UL Final    ABS. EOSINOPHILS 03/21/2018 0.3  0.0 - 0.4 K/UL Final    ABS. BASOPHILS 03/21/2018 0.0  0.0 - 0.06 K/UL Final    DF 03/21/2018 AUTOMATED    Final    Sodium 03/21/2018 138  136 - 145 mmol/L Final    Potassium 03/21/2018 4.5  3.5 - 5.5 mmol/L Final    Chloride 03/21/2018 101  100 - 108 mmol/L Final    CO2 03/21/2018 30  21 - 32 mmol/L Final    Anion gap 03/21/2018 7  3.0 - 18 mmol/L Final    Glucose 03/21/2018 122* 74 - 99 mg/dL Final    BUN 03/21/2018 17  7.0 - 18 MG/DL Final    Creatinine 03/21/2018 0.70  0.6 - 1.3 MG/DL Final    BUN/Creatinine ratio 03/21/2018 24* 12 - 20   Final    GFR est AA 03/21/2018 >60  >60 ml/min/1.73m2 Final    GFR est non-AA 03/21/2018 >60  >60 ml/min/1.73m2 Final    Comment: (NOTE)  Estimated GFR is calculated using the Modification of Diet in Renal   Disease (MDRD) Study equation, reported for both  Americans   (GFRAA) and non- Americans (GFRNA), and normalized to 1.73m2   body surface area. The physician must decide which value applies to   the patient. The MDRD study equation should only be used in   individuals age 25 or older. It has not been validated for the   following: pregnant women, patients with serious comorbid conditions,   or on certain medications, or persons with extremes of body size,   muscle mass, or nutritional status.  Calcium 03/21/2018 8.8  8.5 - 10.1 MG/DL Final    Bilirubin, total 03/21/2018 0.3  0.2 - 1.0 MG/DL Final    ALT (SGPT) 03/21/2018 21  16 - 61 U/L Final    AST (SGOT) 03/21/2018 8* 15 - 37 U/L Final    Alk. phosphatase 03/21/2018 76  45 - 117 U/L Final    Protein, total 03/21/2018 6.0* 6.4 - 8.2 g/dL Final    Albumin 03/21/2018 3.2* 3.4 - 5.0 g/dL Final    Globulin 03/21/2018 2.8  2.0 - 4.0 g/dL Final    A-G Ratio 03/21/2018 1.1  0.8 - 1.7   Final   Admission on 03/10/2018, Discharged on 03/15/2018   No results displayed because visit has over 200 results.       Admission on 02/20/2018, Discharged on 02/20/2018   Component Date Value Ref Range Status    WBC 02/20/2018 5.6  4.0 - 11.0 1000/mm3 Final    RBC 02/20/2018 4.67  3.80 - 5.70 M/uL Final    HGB 02/20/2018 13.9  12.4 - 17.2 gm/dl Final    HCT 02/20/2018 43.0  37.0 - 50.0 % Final    MCV 02/20/2018 92.1  80.0 - 98.0 fL Final    MCH 02/20/2018 29.8  23.0 - 34.6 pg Final    MCHC 02/20/2018 32.3  30.0 - 36.0 gm/dl Final    PLATELET 17/05/6196 454  140 - 450 1000/mm3 Final    MPV 02/20/2018 10.7* 6.0 - 10.0 fL Final    RDW-SD 02/20/2018 45.9* 35.1 - 43.9   Final    NRBC 02/20/2018 0  0 - 0   Final    IMMATURE GRANULOCYTES 02/20/2018 0.7  0.0 - 3.0 % Final    Comment: IG - Immature granulocytes (promyelocytes + myelocytes + metamyelocytes), their presence  indicates a left shift. An IG > 3% may predict positive blood cultures with 98% specificity.  (P<0.04) and 92% Positive Predictive Value (Meghan)1. Increased immature granulocytes  assist with the detection of infection and/or inflammation and may be present at an early stage  and are more sensitive and specific than band counts.  NEUTROPHILS 02/20/2018 54.8  34 - 64 % Final    LYMPHOCYTES 02/20/2018 33.4  28 - 48 % Final    MONOCYTES 02/20/2018 7.1  1 - 13 % Final    EOSINOPHILS 02/20/2018 3.6  0 - 5 % Final    BASOPHILS 02/20/2018 0.4  0 - 3 % Final    Sodium 02/20/2018 140  136 - 145 mEq/L Final    Potassium 02/20/2018 4.0  3.5 - 5.1 mEq/L Final    Chloride 02/20/2018 101  98 - 107 mEq/L Final    CO2 02/20/2018 34* 21 - 32 mEq/L Final    Glucose 02/20/2018 120* 74 - 106 mg/dl Final    BUN 02/20/2018 18  7 - 25 mg/dl Final    Creatinine 02/20/2018 0.8  0.6 - 1.3 mg/dl Final    GFR est AA 02/20/2018 >60    Final    Comment: THE [employer] WORKING GROUP STATES THAT THE MDRD STUDY EQUATION SHOULD ONLY BE USED ON  INDIVIDUALS 18 OR OLDER.  THE REPORT ALSO NOTES THAT THE MDRD STUDY EQUATION HAS NOT BEEN  VALIDATED FOR USE WITH THE ELDERLY (OVER 79YEARS OF AGE), PREGNANT WOMEN, PATIENTS WITH SERIOUS  COMORBID CONDITIONS, OR PERSONS WITH EXTREMES OF BODY SIZE, MUSCLE MASS, OR NUTRITIONAL STATUS. APPLICATION OF THE EQUATION TO THESE PATIENT GROUPS MAY LEAD TO ERRORS IN GFR ESTIMATION. GFR  ESTIMATING EQUATIONS HAVE POORER AGREEMENT WITH MEASURED GFR FOR ILL HOSPITALIZED PATIENTS AND  FOR PEOPLE WITH NEAR NORMAL KIDNEY FUNCTION THAN FOR SUBJECTS IN THE MDRD STUDY. VALIDATION  STUDIES ARE IN PROGRESS TO EVALUATE THE MDRD STUDY EQUATION FOR ADDITIONAL ETHNIC GROUPS, THE  ELDERLY, VARIOUS DISEASE CONDITIONS, AND PEOPLE WITH NORMAL KIDNEY FUNCTION. GFRA----REFERS TO   GFRO---REFERS TO OTHER RACES  REFERENCES AVAILABLE UPON REQUEST.      GFR est non-AA 02/20/2018 >60    Final    Calcium 02/20/2018 9.4  8.5 - 10.1 mg/dl Final    AST (SGOT) 02/20/2018 13* 15 - 37 U/L Final    ALT (SGPT) 02/20/2018 13  12 - 78 U/L Final    Alk. phosphatase 02/20/2018 93  45 - 117 U/L Final    Bilirubin, total 02/20/2018 0.7  0.2 - 1.0 mg/dl Final    Protein, total 02/20/2018 6.9  6.4 - 8.2 gm/dl Final    Albumin 02/20/2018 3.5  3.4 - 5.0 gm/dl Final    Lipase 02/20/2018 123  73 - 393 U/L Final    Magnesium 02/20/2018 2.1  1.6 - 2.6 mg/dl Final    Sodium 02/20/2018 140  136 - 145 mEq/L Final    Potassium 02/20/2018 4.1  3.5 - 4.9 mEq/L Final    Chloride 02/20/2018 99  98 - 107 mEq/L Final    CO2, TOTAL 02/20/2018 34* 21 - 32 mmol/L Final    Glucose 02/20/2018 124* 74 - 106 mg/dL Final    BUN 02/20/2018 19  7 - 25 mg/dl Final    Creatinine 02/20/2018 0.8  0.6 - 1.3 mg/dl Final    HCT 02/20/2018 44  40 - 54 % Final    HGB 02/20/2018 15.0  12.4 - 17.2 gm/dl Final    CALCIUM,IONIZED 02/20/2018 4.70  4.40 - 5.40 mg/dL Final       .No results found for any visits on 04/13/18. Assessment / Plan      ICD-10-CM ICD-9-CM    1.  DISH (diffuse idiopathic skeletal hyperostosis) M48.10 721.6 furosemide (LASIX) 20 mg tablet      oxyCODONE-acetaminophen (PERCOCET 7.5) 7.5-325 mg per tablet      oxyCODONE ER (OXYCONTIN) 10 mg ER tablet   2. Chronic cholecystitis K81.1 575.11 furosemide (LASIX) 20 mg tablet      oxyCODONE-acetaminophen (PERCOCET 7.5) 7.5-325 mg per tablet      oxyCODONE ER (OXYCONTIN) 10 mg ER tablet   3. Right upper quadrant abdominal pain R10.11 789.01 furosemide (LASIX) 20 mg tablet      oxyCODONE-acetaminophen (PERCOCET 7.5) 7.5-325 mg per tablet      oxyCODONE ER (OXYCONTIN) 10 mg ER tablet       Patient has significant chronic pain with a serious medical condition that will limit his life expectancy. I will become the prescribing physician for Mr. Juarez's pain medications. I pain agreement has been signed. A urine drug screen will be obtained in the near future  Percocet and Oxyctoninwere prescribed. The patient has been on these medications before - the doses were adjusted  The Prescription Monitoring Program registry was checked prior to the issuing of this prescription for a controlled substance. he was advised to continue his maintenance medications      Follow-up Disposition:  Return in about 4 weeks (around 5/11/2018). I asked Suze Ramírez if he has any questions and I answered the questions. Suze Jameson. states that he understands the treatment plan and agrees with the treatment plan

## 2018-04-24 NOTE — PROGRESS NOTES
Hospital Discharge Follow Up     Og Valadez was hospitalized at [facility] 3/10-3/15/18 for PNA, sepsis, AMS and discharged to home with [facility] home care. The patient had an outpatient procedure at [facility] on 4/20/18 to exchange his cholecystostomy catheter. Reached patient's wife who stated patient is having abdominal pain around the site of the catheter since the exchange. Dr. Frances Martinez has taken over pain management for the patient. Patient is receiving pain medications as prescribed. [facility] continues to provide service in the home for physical therapy and nursing. Wife reminded that there are physicians on call 24 hours a day / 7 days a week (M-F 5pm to 8am and from Friday 5pm until Monday 8a for the weekend) should the patient have questions or concerns. NN provided contact information for future reference. Goals Addressed             Most Recent       Post Hospitalization     Knowledge and adherence of prescribed medication (ie. action, side effects, missed dose, etc.). On track (4/24/2018)             Plan:  Reconcile medications.   Wife to teach back purpose and when to give-done 3/16/18, 4/24/18-wife able to teach back purpose of medications and when to give

## 2018-05-04 NOTE — PROGRESS NOTES
This is the Subsequent Medicare Annual Wellness Exam, performed 12 months or more after the Initial AWV or the last Subsequent AWV    I have reviewed the patient's medical history in detail and updated the computerized patient record. History   The patient persists with right upper quadrant abdominal pain from chronic cholecystitis. 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 The patient currently is doing ok with pain in good control with pain medications. He is eating ok. There has been evidence for any active infection      Past Medical History:   Diagnosis Date    3-vessel coronary artery disease 02/13/2004    Tech limited. Low normal to mildly depressed LV systolic function. LVH. DDfx. LAE. PASP 25-30. 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 mLAD 100%. CX diffuse nonobstructive. OM1 patent stent. pRCA 100%. Dbl seq SVG to D1, OM1 100%. SVG to pRPDA 99% (3.5 x 23-mm Xience stent). Patent mid segment stent. LIMA to LAD ok.  Cardiac echocardiogram 11/16/2014    [facility]:  EF 55-60%. DDfx. Normal RVSP. Mild TR. Neg bubble study for atrial shunt. No significant valvular pathology.  Cardiac nuclear imaging test, high risk 06/15/2012    Lg area of mod mid to distal anterior ischemia. Lg area of inferior ischemia. EF 57%. Mild mid-distal anterior, inferior hypk.   High risk pharm stress test.    Cervical spine pain     Deviated septum     s/p corrective surgery 7/1/2010    Disc disease, degenerative, cervical     joint pain, back pain    Dysphagia, unspecified(787.20)     Essential hypertension, benign     Gout with other specified manifestations(274.89)     Head injury     frequent headaches    Hypercholesterolemia     Insomnia, unspecified     Lumbar canal stenosis     Meniere's disease     Myocardial infarction, anterior wall (HCC)     DIEGO (obstructive sleep apnea)     on CPAP    S/P diskectomy 7/5/07    cervical    Spinal stenosis, lumbar region, without neurogenic claudication     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     Unspecified sinusitis (chronic)     Ventral hernia, unspecified, without mention of obstruction or gangrene     S/p repair 6/2003      Past Surgical History:   Procedure Laterality Date    CARDIAC CATHETERIZATION  3/15/2017         CARDIAC SURG PROCEDURE UNLIST  1/04    angioplasty of a proximal left anterior descending    CARDIAC SURG PROCEDURE UNLIST  6/2/08    angioplasty and stenting of totally obstructed saphenous vein graft to the posterior descending artery    CORONARY ARTERY ANGIOGRAM  3/15/2017         HX APPENDECTOMY      HX CATARACT REMOVAL      bilateral    HX CERVICAL DISKECTOMY  7/07    and fusion    HX CORONARY ARTERY BYPASS GRAFT  4/14/94    X4, with LIMA to the LAD, double sequential to the diagonal and circumflex and single to the main right coronary artery    HX CORONARY STENT PLACEMENT  6/20/2012    HX GI  1992    two feet of colon removed    HX HEART CATHETERIZATION  6/02/08    HX HEART CATHETERIZATION  6/20/2012    HX HEENT  7/1/10    deviated nasal septum surgery    HX HERNIA REPAIR  7/2003    two prior hernia repairs in the past    HX OTHER SURGICAL      HX PTCA  6/20/2012    HX TONSILLECTOMY      as a child    [facility] [address] [facility]      [facility]     Current Outpatient Prescriptions   Medication Sig Dispense Refill    [START ON 5/18/2018] oxyCODONE ER (OXYCONTIN) 10 mg ER tablet Take 1 Tab by mouth every twelve (12) hours.  Max Daily Amount: 20 mg. 60 Tab 0    [START ON 5/25/2018] oxyCODONE-acetaminophen (PERCOCET 7.5) 7.5-325 mg per tablet 1 tab every six hours as needed for pain 120 Tab 0    furosemide (LASIX) 20 mg tablet Take 20 mg by mouth daily.  potassium chloride SR (KLOR-CON 10) 10 mEq tablet TAKE 1 TABLET BY MOUTH 2 TIMES A DAY 60 Tab 1    haloperidol (HALDOL) 0.5 mg tablet TAKE 1 TABLET BY MOUTH EVERY NIGHT AT BEDTIME 30 Tab 2    donepezil (ARICEPT) 10 mg tablet Take 1 Tab by mouth nightly. 90 Tab 0    metoprolol tartrate (LOPRESSOR) 50 mg tablet TAKE 1 TABLET BY MOUTH 2 TIMES A DAY (Patient taking differently: TAKE 1/2 TABLET BY MOUTH 2 TIMES A DAY, reports 25mg BID) 60 Tab 11    allopurinol (ZYLOPRIM) 300 mg tablet TAKE 1 TABLET BY MOUTH ONCE DAILY 90 Tab 3    aspirin delayed-release 81 mg tablet Take 81 mg by mouth two (2) times a day.  MULTIVITAMINS W-MINERALS/LUT (CENTRUM SILVER PO) Take 1 Tab by mouth daily.  nitroglycerin (NITROSTAT) 0.4 mg SL tablet by SubLINGual route every five (5) minutes as needed for Chest Pain.        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 H47.9    Unspecified sinusitis (chronic) J32.9    Gout with other specified manifestations(274.89) M10.9    Insomnia, unspecified G47.00    Spinal stenosis, lumbar region, without neurogenic claudication M48.061    Essential hypertension, benign I10    Cervical spine pain M54.2    DISH (diffuse idiopathic skeletal hyperostosis) M48.10    Encephalopathy G93.40    Advance directive discussed with patient Z71.89    Lung nodule R91.1    Liver nodule K76.89    Prostate enlargement N40.0    Gallstones K80.20    Abdominal pain R10.9  Chronic cholecystitis K81.1    TIA (transient ischemic attack) G45.9    Respiratory failure (HCC) J96.90    Altered mental status R41.82    Pneumonia J18.9    Sepsis ([address] A41.9       Depression Risk Factor Screening:     PHQ over the last two weeks 3/21/2018   Little interest or pleasure in doing things Not at all   Feeling down, depressed or hopeless Not at all   Total Score PHQ 2 0     Alcohol Risk Factor Screening: You do not drink alcohol or very rarely. Functional Ability and Level of Safety:   Hearing Loss  Hearing is good. Activities of Daily Living  The home contains: no safety equipment. Patient needs help with:  transportation, preparing meals, laundry, housework, managing medications, managing money, dressing, bathing and hygiene    Fall Risk  Fall Risk Assessment, last 12 mths 3/21/2018   Able to walk? Yes   Fall in past 12 months? No       Abuse Screen  Patient is not abused    Cognitive Screening   Evaluation of Cognitive Function:  Has your family/caregiver stated any concerns about your memory: yes  Normal, Abnormal    Patient Care Team   Patient Care Team:  Sasha Phelan MD as PCP - General (Internal Medicine)  Shu Torres MD as Physician (Surgery)  [facility], MD (Physical Medicine and Rehab)  Olga Gonzales MD as Physician (Neurology)  Anselmo Givens OD as Physician (Ophthalmology)  Leena Neal DO (Cardiology)  Eusebio Kenney RN as Ambulatory Care Navigator    Assessment/Plan   Education and counseling provided:  Are appropriate based on today's review and evaluation    Diagnoses and all orders for this visit:    1. Preventative health care  -     pneumococcal 13 ricarda conj dip (PREVNAR-13) 0.5 mL syrg injection; 0.5 mL by IntraMUSCular route once for 1 dose. 2. DISH (diffuse idiopathic skeletal hyperostosis)  -     oxyCODONE ER (OXYCONTIN) 10 mg ER tablet; Take 1 Tab by mouth every twelve (12) hours.  Max Daily Amount: 20 mg.  - oxyCODONE-acetaminophen (PERCOCET 7.5) 7.5-325 mg per tablet; 1 tab every six hours as needed for pain    3. Chronic cholecystitis  -     oxyCODONE ER (OXYCONTIN) 10 mg ER tablet; Take 1 Tab by mouth every twelve (12) hours. Max Daily Amount: 20 mg.  -     oxyCODONE-acetaminophen (PERCOCET 7.5) 7.5-325 mg per tablet; 1 tab every six hours as needed for pain    4. Right upper quadrant abdominal pain  -     oxyCODONE ER (OXYCONTIN) 10 mg ER tablet; Take 1 Tab by mouth every twelve (12) hours. Max Daily Amount: 20 mg.  -     oxyCODONE-acetaminophen (PERCOCET 7.5) 7.5-325 mg per tablet; 1 tab every six hours as needed for pain    5. Encounter for immunization  -     ADMIN PNEUMOCOCCAL VACCINE  Medicare Injection Admin Charge  -     Pneumococcal Conjugate vaccine - 13 valent (50 years and older)        Health Maintenance Due   Topic Date Due    DTaP/Tdap/Td series (1 - Tdap) 12/13/1956       Prevnar 13 given  he was advised to continue his maintenance medications. The Prescription Monitoring Program registry was checked today      Follow-up Disposition:  Return in about 6 weeks (around 6/15/2018). I asked Suze Barreto if he has any questions and I answered the questions. Suze Bill. states that he understands the treatment plan and agrees with the treatment plan

## 2018-05-04 NOTE — PATIENT INSTRUCTIONS
Vaccine Information Statement     Pneumococcal Conjugate Vaccine (PCV13): What You Need to Know    Many Vaccine Information Statements are available in [place] and other languages. See www.immunize.org/vis. Hojas de información Sobre Vacunas están disponibles en español y en muchos otros idiomas. Visite www.immunize.org/vis. 1. Why get vaccinated? Vaccination can protect both children and adults from pneumococcal disease. Pneumococcal disease is caused by bacteria that can spread from person to person through close contact. It can cause ear infections, and it can also lead to more serious infections of the:   Lungs (pneumonia),   Blood (bacteremia), and   Covering of the brain and spinal cord (meningitis). Pneumococcal pneumonia is most common among adults. Pneumococcal meningitis can cause deafness and brain damage, and it kills about 1 child in 10 who get it. Anyone can get pneumococcal disease, but children under 3years of age and adults 72 years and older, people with certain medical conditions, and cigarette smokers are at the highest risk. Before there was a vaccine, the [facility] saw:   more than 700 cases of meningitis,   about 13,000 blood infections,   about 5 million ear infections, and   about 200 deaths  in children under 5 each year from pneumococcal disease. Since vaccine became available, severe pneumococcal disease in these children has fallen by 88%. About 18,000 older adults die of pneumococcal disease each year in the [place]. Treatment of pneumococcal infections with penicillin and other drugs is not as effective as it used to be, because some strains of the disease have become resistant to these drugs. This makes prevention of the disease, through vaccination, even more important. 2. PCV13 vaccine    Pneumococcal conjugate vaccine (called PCV13) protects against 13 types of pneumococcal bacteria.       PCV13 is routinely given to children at 2, 4, 6, and 1515 months of age. It is also recommended for children and adults 3to 59years of age with certain health conditions, and for all adults 72years of age and older. Your doctor can give you details. 3. Some people should not get this vaccine    Anyone who has ever had a life-threatening allergic reaction to a dose of this vaccine, to an earlier pneumococcal vaccine called PCV7, or to any vaccine containing diphtheria toxoid (for example, DTaP), should not get PCV13. Anyone with a severe allergy to any component of PCV13 should not get the vaccine. Tell your doctor if the person being vaccinated has any severe allergies. If the person scheduled for vaccination is not feeling well, your healthcare provider might decide to reschedule the shot on another day. 4. Risks of a vaccine reaction    With any medicine, including vaccines, there is a chance of reactions. These are usually mild and go away on their own, but serious reactions are also possible. Problems reported following PCV13 varied by age and dose in the series. The most common problems reported among children were:    About half became drowsy after the shot, had a temporary loss of appetite, or had redness or tenderness where the shot was given.  About 1 out of 3 had swelling where the shot was given.  About 1 out of 3 had a mild fever, and about 1 in 20 had a fever over 102.2°F.   Up to about 8 out of 10 became fussy or irritable. Adults have reported pain, redness, and swelling where the shot was given; also mild fever, fatigue, headache, chills, or muscle pain. Argenis Primus children who get PCV13 along with inactivated flu vaccine at the same time may be at increased risk for seizures caused by fever. Ask your doctor for more information. Problems that could happen after any vaccine:     People sometimes faint after a medical procedure, including vaccination.  Sitting or lying down for about 15 minutes can help prevent fainting, and injuries caused by a fall. Tell your doctor if you feel dizzy, or have vision changes or ringing in the ears.  Some older children and adults get severe pain in the shoulder and have difficulty moving the arm where a shot was given. This happens very rarely.  Any medication can cause a severe allergic reaction. Such reactions from a vaccine are very rare, estimated at about 1 in a million doses, and would happen within a few minutes to a few hours after the vaccination. As with any medicine, there is a very small chance of a vaccine causing a serious injury or death. The safety of vaccines is always being monitored. For more information, visit: www.cdc.gov/vaccinesafety/     5. What if there is a serious reaction? What should I look for?  Look for anything that concerns you, such as signs of a severe allergic reaction, very high fever, or unusual behavior. Signs of a severe allergic reaction can include hives, swelling of the face and throat, difficulty breathing, a fast heartbeat, dizziness, and weakness - usually within a few minutes to a few hours after the vaccination. What should I do?  If you think it is a severe allergic reaction or other emergency that cant wait, call 9-1-1 or get the person to the nearest hospital. Otherwise, call your doctor. Reactions should be reported to the Vaccine Adverse Event Reporting System (VAERS). Your doctor should file this report, or you can do it yourself through the VAERS web site at www.vaers. hhs.gov, or by calling 4-723.546.8595. VAERS does not give medical advice. 6. The National Vaccine Injury Compensation Program    The [facility] Vaccine Injury Compensation Program (VICP) is a federal program that was created to compensate people who may have been injured by certain vaccines.     Persons who believe they may have been injured by a vaccine can learn about the program and about filing a claim by calling 1-981.660.6388 or visiting the [facility] website at www.[facility].gov/vaccinecompensation. There is a time limit to file a claim for compensation. 7. How can I learn more?  Ask your healthcare provider. He or she can give you the vaccine package insert or suggest other sources of information.  Call your local or state health department.  Contact the Centers for Disease Control and Prevention (CDC):  - Call 6-212.519.9662 (4-997-HYS-INFO) or  - Visit CDCs website at www.cdc.gov/vaccines    Vaccine Information Statement   PCV13 Vaccine   11/5/2015   [id number]    Department of Health and Human Services  Centers for Disease Control and Prevention    Office Use Only

## 2018-05-04 NOTE — PROGRESS NOTES
Identified pt with two pt identifiers(name and ). Reviewed record in preparation for visit and have obtained necessary documentation. Chief Complaint   Patient presents with    Gallbladder Attack     3wk f/u        Health Maintenance Due   Topic    DTaP/Tdap/Td series (1 - Tdap)    Pneumococcal 65+ Low/Medium Risk (2 of 2 - PCV13)       Coordination of Care Questionnaire:  :   1) Have you been to an emergency room, urgent care clinic since your last visit? no   Hospitalized since your last visit? no             2. Have seen or consulted any other health care provider since your last visit? YES  If yes, where when, and reason for visit? 18: biliary catheter exchange with Dr. Johanna Gunter    3) Do you have an Advanced Directive/ Living Will in place? YES  If yes, do we have a copy on file YES  If no, would you like information NO    Patient is accompanied by spouse; I have received verbal consent from [address] to discuss any/all medical information while they are present in the room.

## 2018-05-07 NOTE — PROGRESS NOTES
Hospital Discharge Follow Up  Agnieszka Lazo was hospitalized at [address] [place] [address] 3/10-3/15/18 for PNA, sepsis, AMS and discharged to home with [facility] home care.     The patient had an outpatient procedure at [address] [place] [address] on 4/20/18 to exchange his cholecystostomy catheter. The patient attended an appointment with Dr. Layne Peguero on 5/4/18 and a MWV was also performed. Per visit note patient is eating OK and there is no evidence of active infection. The patient did complain of continued RUQ abdominal pain from chronic cholecystitis. No medication changes noted. Patient was instructed to RTC in about 6 weeks. Goals Addressed             Most Recent       Post Hospitalization     Attends follow-up appointments as directed. On track (5/7/2018)             Plan:  Monitor for attendance at apts and assist as needed to schedule.   3/21/18, 3/30/18, 4/13/18, 5/4/18-Attended apt with Dr. Layne Peguero

## 2018-05-10 NOTE — TELEPHONE ENCOUNTER
Requested Prescriptions     Pending Prescriptions Disp Refills    oxyCODONE-acetaminophen (PERCOCET 7.5) 7.5-325 mg per tablet 120 Tab 0     Si tab every six hours as needed for pain    oxyCODONE ER (OXYCONTIN) 10 mg ER tablet 60 Tab 0     Sig: Take 1 Tab by mouth every twelve (12) hours. Max Daily Amount: 20 mg.      Dates corrected original prescriptions destroyed by staff

## 2018-05-15 NOTE — PROGRESS NOTES
Hospital Discharge Follow Up      Tim Almonte was hospitalized at [facility] 3/10-3/15/18 for PNA, sepsis, AMS and discharged to home with [facility] home care. On 3/28/18 the patient was seen in [facility] ED for a fever and discharged to home. The patient had a scheduled procedure at [facility] on 4/20/18 to exchange his cholecystotomy tube. Reached patient's wife who stated the patient \"is doing about as well as can be expected. \"  Wife stated patient has a little dry cough that occurs intermittently during the day and is not associated with eating. Per wife no temperature. [facility] continues to provide service in the home. Advised wife to mention the cough to home care nurse to have lungs assessed and to contact Dr. Leif Campos for any worsening, fever, increased confusion. Wife verbalized understanding. Wife manages medications and stated patient is taking as directed. Goals Addressed             Most Recent       Post Hospitalization     Knowledge and adherence of prescribed medication (ie. action, side effects, missed dose, etc.). On track (5/15/2018)             Plan:  Reconcile medications. Wife to teach back purpose and when to give-done 3/16/18, 4/24/18-wife able to teach back purpose of medications and when to give       Understands red flags post discharge. On track (5/15/2018)             Plan:  Educate wife re s/s of sepsis as listed below.     3/16/18-Wife to be able to teach back  s/s and when to notify MD.    Signs     Confusion   Fast breathing   Chills or shaking   Fever or low body temperature   Fast heart beat   Lightheadedness   Less urine output   Skin rash or skin color changes

## 2018-05-29 PROBLEM — A41.9 SEPSIS (HCC): Status: RESOLVED | Noted: 2018-01-01 | Resolved: 2018-01-01

## 2018-05-29 PROBLEM — J96.90 RESPIRATORY FAILURE (HCC): Status: RESOLVED | Noted: 2018-01-01 | Resolved: 2018-01-01

## 2018-05-29 PROBLEM — K76.89 LIVER NODULE: Status: RESOLVED | Noted: 2017-01-01 | Resolved: 2018-01-01

## 2018-05-29 NOTE — PROGRESS NOTES
The patient presents to the office today with the chief complaint of chest congestion    HPI    The patient has gallstones with chronic cholecystitis. This causes chronic pain which is in fair control on the pain medications. The patient has no behavior to suggest misuse of the medications. The patient has chronic encephalopathy with confusion. This is unchanged. The patient recently has developed chest congestion with an intermittent \"rattlely cough\". There is no dyspnea or fever. Review of Systems   Respiratory: Negative for shortness of breath. Cardiovascular: Negative for chest pain and leg swelling. No Known Allergies    Current Outpatient Prescriptions   Medication Sig Dispense Refill    sulfamethoxazole-trimethoprim (BACTRIM;SEPTRA) 200-40 mg/5 mL suspension 2 teaspoons twice per day 150 mL 0    donepezil (ARICEPT) 10 mg tablet TAKE 1 TABLET BY MOUTH AT BEDTIME 90 Tab 0    oxyCODONE-acetaminophen (PERCOCET 7.5) 7.5-325 mg per tablet 1 tab every six hours as needed for pain 120 Tab 0    oxyCODONE ER (OXYCONTIN) 10 mg ER tablet Take 1 Tab by mouth every twelve (12) hours. Max Daily Amount: 20 mg. 60 Tab 0    furosemide (LASIX) 20 mg tablet TAKE 1 TABLET BY MOUTH EVERY MORNING 30 Tab 2    furosemide (LASIX) 20 mg tablet Take 20 mg by mouth daily.  potassium chloride SR (KLOR-CON 10) 10 mEq tablet TAKE 1 TABLET BY MOUTH 2 TIMES A DAY 60 Tab 1    haloperidol (HALDOL) 0.5 mg tablet TAKE 1 TABLET BY MOUTH EVERY NIGHT AT BEDTIME 30 Tab 2    nitroglycerin (NITROSTAT) 0.4 mg SL tablet by SubLINGual route every five (5) minutes as needed for Chest Pain.       metoprolol tartrate (LOPRESSOR) 50 mg tablet TAKE 1 TABLET BY MOUTH 2 TIMES A DAY (Patient taking differently: TAKE 1/2 TABLET BY MOUTH 2 TIMES A DAY, reports 25mg BID) 60 Tab 11    allopurinol (ZYLOPRIM) 300 mg tablet TAKE 1 TABLET BY MOUTH ONCE DAILY 90 Tab 3    aspirin delayed-release 81 mg tablet Take 81 mg by mouth two (2) times a day.  MULTIVITAMINS W-MINERALS/LUT (CENTRUM SILVER PO) Take 1 Tab by mouth daily. Past Medical History:   Diagnosis Date    3-vessel coronary artery disease 02/13/2004    Tech limited. Low normal to mildly depressed LV systolic function. LVH. DDfx. LAE. PASP 25-30. 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 mLAD 100%. CX diffuse nonobstructive. OM1 patent stent. pRCA 100%. Dbl seq SVG to D1, OM1 100%. SVG to pRPDA 99% (3.5 x 23-mm Xience stent). Patent mid segment stent. LIMA to LAD ok.  Cardiac echocardiogram 11/16/2014    [facility]:  EF 55-60%. DDfx. Normal RVSP. Mild TR. Neg bubble study for atrial shunt. No significant valvular pathology.  Cardiac nuclear imaging test, high risk 06/15/2012    Lg area of mod mid to distal anterior ischemia. Lg area of inferior ischemia. EF 57%. Mild mid-distal anterior, inferior hypk.   High risk pharm stress test.    Cervical spine pain     Deviated septum     s/p corrective surgery 7/1/2010    Disc disease, degenerative, cervical     joint pain, back pain    Dysphagia, unspecified(787.20)     Essential hypertension, benign     Gout with other specified manifestations(274.89)     Head injury     frequent headaches    Hypercholesterolemia     Insomnia, unspecified     Lumbar canal stenosis     Meniere's disease     Myocardial infarction, anterior wall (HCC)     DIEGO (obstructive sleep apnea)     on CPAP    S/P diskectomy 7/5/07    cervical    Spinal stenosis, lumbar region, without neurogenic claudication     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     Unspecified sinusitis (chronic)     Ventral hernia, unspecified, without mention of obstruction or gangrene     S/p repair 6/2003       Past Surgical History: Procedure Laterality Date    CARDIAC CATHETERIZATION  3/15/2017         CARDIAC SURG PROCEDURE UNLIST  1/04    angioplasty of a proximal left anterior descending    CARDIAC SURG PROCEDURE UNLIST  6/2/08    angioplasty and stenting of totally obstructed saphenous vein graft to the posterior descending artery    CORONARY ARTERY ANGIOGRAM  3/15/2017         HX APPENDECTOMY      HX CATARACT REMOVAL      bilateral    HX CERVICAL DISKECTOMY  7/07    and fusion    HX CORONARY ARTERY BYPASS GRAFT  4/14/94    X4, with LIMA to the LAD, double sequential to the diagonal and circumflex and single to the main right coronary artery    HX CORONARY STENT PLACEMENT  6/20/2012    HX GI  1992    two feet of colon removed    HX HEART CATHETERIZATION  6/02/08    HX HEART CATHETERIZATION  6/20/2012    HX HEENT  7/1/10    deviated nasal septum surgery    HX HERNIA REPAIR  7/2003    two prior hernia repairs in the past    HX OTHER SURGICAL      HX PTCA  6/20/2012    HX TONSILLECTOMY      as a child    [address]      [address]       Social History     Social History    Marital status:      Spouse name: N/A    Number of children: N/A    Years of education: N/A     Occupational History    Not on file. Social History Main Topics    Smoking status: Never Smoker    Smokeless tobacco: Never Used    Alcohol use Yes    Drug use: No    Sexual activity: No     Other Topics Concern    Not on file     Social History Narrative       Patient does have an advanced directive on file    Visit Vitals    /60 (BP 1 Location: Left arm, BP Patient Position: Sitting)    Pulse 66    Temp 97.9 °F (36.6 °C) (Tympanic)    Resp 16    Ht 5' 11\" (1.803 m)    Wt 157 lb (71.2 kg)    SpO2 93%    BMI 21.9 kg/m2       Physical Exam   Neck: Carotid bruit is not present. Cardiovascular: Normal rate and regular rhythm. Exam reveals no gallop. No murmur heard.   Pulmonary/Chest: He has no wheezes. He has no rales. Abdominal: Soft. He exhibits no distension and no mass. There is tenderness (Mild right up quadrant tenderness). Musculoskeletal: He exhibits no edema. Neurological:   The patient remains verbally noncommunicative       BMI:  OK    Admission on 04/20/2018, Discharged on 04/20/2018   Component Date Value Ref Range Status    Prothrombin time 04/20/2018 14.3* 0.0 - 14.0 seconds Final    INR 04/20/2018 1.2* 0.0 - 1.1   Final    Comment: PATIENTS RECEIVING THE ANTIBIOTIC CUBICIN (DAPTOMYCIN FOR INJECTION) SHOULD BE TESTED FOR PT/INR  BY AN ALTERNATIVE METHOD. CUBICIN HAS BEEN SHOWN TO CAUSE FALSE ELEVATION OF THE INR USING  i-STAT PT/INR CARTRIDGES. Admission on 03/28/2018, Discharged on 03/28/2018   Component Date Value Ref Range Status    WBC 03/28/2018 9.6  4.0 - 11.0 1000/mm3 Final    RBC 03/28/2018 4.02  3.80 - 5.70 M/uL Final    HGB 03/28/2018 12.0* 12.4 - 17.2 gm/dl Final    HCT 03/28/2018 36.4* 37.0 - 50.0 % Final    MCV 03/28/2018 90.5  80.0 - 98.0 fL Final    MCH 03/28/2018 29.9  23.0 - 34.6 pg Final    MCHC 03/28/2018 33.0  30.0 - 36.0 gm/dl Final    PLATELET 21/64/0738 704  140 - 450 1000/mm3 Final    MPV 03/28/2018 10.8* 6.0 - 10.0 fL Final    RDW-SD 03/28/2018 46.8* 35.1 - 43.9   Final    NRBC 03/28/2018 0  0 - 0   Final    IMMATURE GRANULOCYTES 03/28/2018 0.5  0.0 - 3.0 % Final    Comment: IG - Immature granulocytes (promyelocytes + myelocytes + metamyelocytes), their presence  indicates a left shift. An IG > 3% may predict positive blood cultures with 98% specificity.  (P<0.04) and 92% Positive Predictive Value (Kelsey-Jacquie)1. Increased immature granulocytes  assist with the detection of infection and/or inflammation and may be present at an early stage  and are more sensitive and specific than band counts.       NEUTROPHILS 03/28/2018 78.8* 34 - 64 % Final    LYMPHOCYTES 03/28/2018 12.7* 28 - 48 % Final    MONOCYTES 03/28/2018 7.2  1 - 13 % Final  EOSINOPHILS 03/28/2018 0.4  0 - 5 % Final    BASOPHILS 03/28/2018 0.4  0 - 3 % Final    Sodium 03/28/2018 138  136 - 145 mEq/L Final    Potassium 03/28/2018 4.2  3.5 - 5.1 mEq/L Final    Chloride 03/28/2018 100  98 - 107 mEq/L Final    CO2 03/28/2018 31  21 - 32 mEq/L Final    Glucose 03/28/2018 119* 74 - 106 mg/dl Final    BUN 03/28/2018 14  7 - 25 mg/dl Final    Creatinine 03/28/2018 0.6  0.6 - 1.3 mg/dl Final    GFR est AA 03/28/2018 >60    Final    Comment: THE [facility] STATES THAT THE MDRD STUDY EQUATION SHOULD ONLY BE USED ON  INDIVIDUALS 18 OR OLDER. THE REPORT ALSO NOTES THAT THE MDRD STUDY EQUATION HAS NOT BEEN  VALIDATED FOR USE WITH THE ELDERLY (OVER 79YEARS OF AGE), PREGNANT WOMEN, PATIENTS WITH SERIOUS  COMORBID CONDITIONS, OR PERSONS WITH EXTREMES OF BODY SIZE, MUSCLE MASS, OR NUTRITIONAL STATUS. APPLICATION OF THE EQUATION TO THESE PATIENT GROUPS MAY LEAD TO ERRORS IN GFR ESTIMATION. GFR  ESTIMATING EQUATIONS HAVE POORER AGREEMENT WITH MEASURED GFR FOR ILL HOSPITALIZED PATIENTS AND  FOR PEOPLE WITH NEAR NORMAL KIDNEY FUNCTION THAN FOR SUBJECTS IN THE MDRD STUDY. VALIDATION  STUDIES ARE IN PROGRESS TO EVALUATE THE MDRD STUDY EQUATION FOR ADDITIONAL ETHNIC GROUPS, THE  ELDERLY, VARIOUS DISEASE CONDITIONS, AND PEOPLE WITH NORMAL KIDNEY FUNCTION. GFRA----REFERS TO   GFRO---REFERS TO OTHER RACES  REFERENCES AVAILABLE UPON REQUEST.      GFR est non-AA 03/28/2018 >60    Final    Calcium 03/28/2018 9.2  8.5 - 10.1 mg/dl Final    AST (SGOT) 03/28/2018 20  15 - 37 U/L Final    ALT (SGPT) 03/28/2018 28  12 - 78 U/L Final    Alk.  phosphatase 03/28/2018 112  45 - 117 U/L Final    Bilirubin, total 03/28/2018 0.7  0.2 - 1.0 mg/dl Final    Protein, total 03/28/2018 6.8  6.4 - 8.2 gm/dl Final    Albumin 03/28/2018 3.1* 3.4 - 5.0 gm/dl Final    Lipase 03/28/2018 45* 73 - 393 U/L Final    Lactic Acid 03/28/2018 1.0  0.4 - 2.0 mmol/L Final    Ventricular Rate 03/28/2018 79  BPM Final    Atrial Rate 03/28/2018 79  BPM Final    P-R Interval 03/28/2018 168  ms Final    QRS Duration 03/28/2018 134  ms Final    Q-T Interval 03/28/2018 406  ms Final    QTC Calculation (Bezet) 03/28/2018 465  ms Final    Calculated P Axis 03/28/2018 62  degrees Final    Calculated R Axis 03/28/2018 -12  degrees Final    Calculated T Axis 03/28/2018 68  degrees Final    Diagnosis 03/28/2018    Final                    Value:Normal sinus rhythm  Right bundle branch block  Abnormal ECG  When compared with ECG of 10-MAR-2018 18:05,  No significant change was found  Confirmed by Costa Conde M.D., Ever Moy ([id number]) on 3/28/2018 4:59:25 PM      Blood Culture Result 03/28/2018 No Growth at 5 days    Final    Blood Culture Result 03/28/2018 No Growth at 5 days    Final    Culture result 03/28/2018 No Growth    Final    Troponin-I 03/28/2018 0.00  0.00 - 0.07 ng/ml Final    Glucose 03/28/2018 Negative  NEGATIVE,Negative mg/dl Final    Bilirubin 03/28/2018 Small* NEGATIVE,Negative   Final    Ketone 03/28/2018 Trace* NEGATIVE,Negative mg/dl Final    Specific gravity 03/28/2018 1.020  1.005 - 1.030   Final    Blood 03/28/2018 Large* NEGATIVE,Negative   Final    pH (UA) 03/28/2018 5.5  5 - 9   Final    Protein 03/28/2018 30* NEGATIVE,Negative mg/dl Final    Urobilinogen 03/28/2018 0.2  0.0 - 1.0 EU/dl Final    Nitrites 03/28/2018 Negative  NEGATIVE,Negative   Final    Leukocyte Esterase 03/28/2018 Negative  NEGATIVE,Negative   Final    Color 03/28/2018 Red    Final    Appearance 03/28/2018 Slightly Cloudy    Final    : Nurme 2 Outpatient Visit on 03/21/2018   Component Date Value Ref Range Status    WBC 03/21/2018 8.3  4.6 - 13.2 K/uL Final    RBC 03/21/2018 4.20* 4.70 - 5.50 M/uL Final    HGB 03/21/2018 12.6* 13.0 - 16.0 g/dL Final    HCT 03/21/2018 38.3  36.0 - 48.0 % Final    MCV 03/21/2018 91.2  74.0 - 97.0 FL Final    MCH 03/21/2018 30.0  24.0 - 34.0 PG Final    MCHC 03/21/2018 32.9  31.0 - 37.0 g/dL Final    RDW 03/21/2018 14.6* 11.6 - 14.5 % Final    PLATELET 28/49/4859 317  135 - 420 K/uL Final    MPV 03/21/2018 11.1  9.2 - 11.8 FL Final    NEUTROPHILS 03/21/2018 57  40 - 73 % Final    LYMPHOCYTES 03/21/2018 29  21 - 52 % Final    MONOCYTES 03/21/2018 10  3 - 10 % Final    EOSINOPHILS 03/21/2018 4  0 - 5 % Final    BASOPHILS 03/21/2018 0  0 - 2 % Final    ABS. NEUTROPHILS 03/21/2018 4.8  1.8 - 8.0 K/UL Final    ABS. LYMPHOCYTES 03/21/2018 2.4  0.9 - 3.6 K/UL Final    ABS. MONOCYTES 03/21/2018 0.8  0.05 - 1.2 K/UL Final    ABS. EOSINOPHILS 03/21/2018 0.3  0.0 - 0.4 K/UL Final    ABS. BASOPHILS 03/21/2018 0.0  0.0 - 0.06 K/UL Final    DF 03/21/2018 AUTOMATED    Final    Sodium 03/21/2018 138  136 - 145 mmol/L Final    Potassium 03/21/2018 4.5  3.5 - 5.5 mmol/L Final    Chloride 03/21/2018 101  100 - 108 mmol/L Final    CO2 03/21/2018 30  21 - 32 mmol/L Final    Anion gap 03/21/2018 7  3.0 - 18 mmol/L Final    Glucose 03/21/2018 122* 74 - 99 mg/dL Final    BUN 03/21/2018 17  7.0 - 18 MG/DL Final    Creatinine 03/21/2018 0.70  0.6 - 1.3 MG/DL Final    BUN/Creatinine ratio 03/21/2018 24* 12 - 20   Final    GFR est AA 03/21/2018 >60  >60 ml/min/1.73m2 Final    GFR est non-AA 03/21/2018 >60  >60 ml/min/1.73m2 Final    Comment: (NOTE)  Estimated GFR is calculated using the Modification of Diet in Renal   Disease (MDRD) Study equation, reported for both  Americans   (GFRAA) and non- Americans (GFRNA), and normalized to 1.73m2   body surface area. The physician must decide which value applies to   the patient. The MDRD study equation should only be used in   individuals age 25 or older. It has not been validated for the   following: pregnant women, patients with serious comorbid conditions,   or on certain medications, or persons with extremes of body size,   muscle mass, or nutritional status.       Calcium 03/21/2018 8.8  8.5 - 10.1 MG/DL Final    Bilirubin, total 03/21/2018 0.3  0.2 - 1.0 MG/DL Final    ALT (SGPT) 03/21/2018 21  16 - 61 U/L Final    AST (SGOT) 03/21/2018 8* 15 - 37 U/L Final    Alk. phosphatase 03/21/2018 76  45 - 117 U/L Final    Protein, total 03/21/2018 6.0* 6.4 - 8.2 g/dL Final    Albumin 03/21/2018 3.2* 3.4 - 5.0 g/dL Final    Globulin 03/21/2018 2.8  2.0 - 4.0 g/dL Final    A-G Ratio 03/21/2018 1.1  0.8 - 1.7   Final   Admission on 03/10/2018, Discharged on 03/15/2018   No results displayed because visit has over 200 results. .No results found for any visits on 05/29/18. Assessment / Plan      ICD-10-CM ICD-9-CM    1. Chest congestion R09.89 786.9    2. Gallstones K80.20 574.20    3. Chronic cholecystitis K81.1 575.11    4. Encephalopathy G93.40 348.30        Septra DS  he was advised to continue his maintenance medications  he is to call if symptoms persist over five days      Follow-up Disposition:  Return in about 3 months (around 8/29/2018). I asked the patient and his wife for any questions and I answered their questions.   They state that they understand the treatment plan and agree with the treatment plan

## 2018-05-30 NOTE — PROGRESS NOTES
Hospital Discharge Follow Up        Ivory Saab was hospitalized at [facility] 3/10-3/15/18 for PNA, sepsis, AMS and discharged to home with [facility] home care. The patient attended an appointment with Dr. Jag Glover on 5/29/18 with a complaint of chest congestion. Bactrim was prescribed. The patient and his wife were instructed to call back if symptoms persist over 5 days. Spoke with wife who stated patient is at baseline mentally-still confused. Patient is taking all medications as directed including new antibiotic. BP continues to be low normal as it has been for several months. Wife encourages patient to drink fluids and eat meals. Ms Michael Abebe understands to contact Dr. Jag Glover if patient does not improve or if new s/s develop. Reviewed s/s of sepsis again. [facility] continues to provide service in the home and a nurse visited today. Per wife nurse stated lungs were clear though BP was on the low side 90s/60s. Goals Addressed             Most Recent     Attends follow-up appointments as directed. On track (5/30/2018)             Plan:  Monitor for attendance at apts and assist as needed to schedule. 3/21/18, 3/30/18, 4/13/18, 5/4/18, 5/29/18-Attended apt with Dr. Landy Dennis and adherence of prescribed medication (ie. action, side effects, missed dose, etc.). On track (5/30/2018)             Plan:  Reconcile medications. Wife to teach back purpose and when to give-done 3/16/18, 4/24/18-wife able to teach back purpose of medications and when to give       Understands red flags post discharge. On track (5/30/2018)             Plan:  Educate wife re s/s of sepsis as listed below.     3/16/18-Wife to be able to teach back  s/s and when to notify MD.    Signs     Confusion   Fast breathing   Chills or shaking   Fever or low body temperature   Fast heart beat   Lightheadedness   Less urine output   Skin rash or skin color changes

## 2018-05-31 NOTE — TELEPHONE ENCOUNTER
Spoke with patients wife and she stated she checked patients blood presser and it was coming uo 112/57 then 117/59 and that patient was sleepy but that was normal for him explain to wife that I would advise Dr Christin Campos

## 2018-06-05 NOTE — PROGRESS NOTES
Hospital Discharge Follow Up          Yesy Carter was hospitalized at [facility] 3/10-3/15/18 for PNA, sepsis, AMS and discharged to home with [facility] home care.     The patient attended an appointment with Dr. Alejandro Monahan on 5/29/18 with a complaint of chest congestion. Bactrim was prescribed. Reached patient's wife who stated patient's cough has improved since starting bactrim. No other problems or concerns at this time. Wife stated the home care nurse visited yesterday and the patient's vital signs are stable. No fever. Patient is taking all medications as prescribed. Goals Addressed             Most Recent     Knowledge and adherence of prescribed medication (ie. action, side effects, missed dose, etc.). On track (6/5/2018)             Plan:  Reconcile medications.   Wife to teach back purpose and when to give-done 3/16/18, 4/24/18-wife able to teach back purpose of medications and when to give

## 2018-06-11 NOTE — TELEPHONE ENCOUNTER
Patients wife called and stated that the medication he was given on the 5/29 appt did not do the job. He is still coughing and bringing up phlegm. She would like something else sent in for him. Please advise at [phone].

## 2018-06-14 NOTE — TELEPHONE ENCOUNTER
Patients wife called and stated that after taking the Levofloxacin he has been lethargic and she is having a hard time moving him. It is making him worse. She would like for Dr Gregg Troy to call her so she can talk to him about what is going on. Please call [phone].

## 2018-06-14 NOTE — TELEPHONE ENCOUNTER
Home health nurse call to report that he had a fall but the wife said there was no bruise and also his blood pressure was a little low 104/56 you may be able to call her @[phone]

## 2018-06-15 NOTE — PROGRESS NOTES
Hospital Discharge Follow Up          Catie Banuelos was hospitalized at [facility] 3/10-3/15/18 for PNA, sepsis, AMS and discharged to home with [facility] home care. Chart reviewed. Reached wife who stated patient's antibiotic was changed because patient was lethargic. Wife stated patient is back at baseline today. Wife understands to contact Dr. Vincenzo Barrera if patient is not improved.

## 2018-06-16 NOTE — DISCHARGE INSTRUCTIONS
Chest Contusion: Care Instructions  Your Care Instructions  A chest contusion, or bruise, is caused by a fall or direct blow to the chest. Car crashes, falls, getting punched, and injury from bicycle handlebars are common causes of chest contusions. A very forceful blow to the chest can injure the heart or blood vessels in the chest, the lungs, the airway, the liver, or the spleen. Pain may be caused by an injury to muscles, cartilage, or ribs. Deep breathing, coughing, or sneezing can increase your pain. Lying on the injured area also can cause pain. Follow-up care is a key part of your treatment and safety. Be sure to make and go to all appointments, and call your doctor if you are having problems. It's also a good idea to know your test results and keep a list of the medicines you take. How can you care for yourself at home? · Rest and protect the injured or sore area. Stop, change, or take a break from any activity that may be causing your pain. · Put ice or a cold pack on the area for 10 to 20 minutes at a time. Put a thin cloth between the ice and your skin. · After 2 or 3 days, if your swelling is gone, apply a heating pad set on low or a warm cloth to your chest. Some doctors suggest that you go back and forth between hot and cold. Put a thin cloth between the heating pad and your skin. · Do not wrap or tape your ribs for support. This may cause you to take smaller breaths, which could increase your risk of pneumonia and lung collapse. · Ask your doctor if you can take an over-the-counter pain medicine, such as acetaminophen (Tylenol), ibuprofen (Advil, Motrin), or naproxen (Aleve). Be safe with medicines. Read and follow all instructions on the label. · Take your medicines exactly as prescribed. Call your doctor if you think you are having a problem with your medicine.   · Gentle stretching and massage may help you feel better after a few days of rest. Stretch slowly to the point just before discomfort begins, then hold the stretch for at least 15 to 30 seconds. Do this 3 or 4 times per day. · As your pain gets better, slowly return to your normal activities. Be patient, because chest bruises can take weeks or months to heal. Any increased pain may be a sign that you need to rest a while longer. When should you call for help? Call 911 anytime you think you may need emergency care. For example, call if:  ? · You have severe trouble breathing. ? · You cough up blood. ?Call your doctor now or seek immediate medical care if:  ? · You have belly pain. ? · You are dizzy or lightheaded, or you feel like you may faint. ? · You develop new symptoms with the chest pain. ? · Your chest pain gets worse. ? · You have a fever. ? · You have some shortness of breath. ? · You have a cough that brings up mucus from the lungs. ? Watch closely for changes in your health, and be sure to contact your doctor if:  ? · Your chest pain is not improving after 1 week. Where can you learn more? Go to http://pablo-isrrael.info/. Enter I174 in the search box to learn more about \"Chest Contusion: Care Instructions. \"  Current as of: March 20, 2017  Content Version: 11.4  © 7629-4107 [employer]. Care instructions adapted under license by [employer] (which disclaims liability or warranty for this information). If you have questions about a medical condition or this instruction, always ask your healthcare professional. [place] [address] any warranty or liability for your use of this information.

## 2018-06-16 NOTE — ED NOTES
Pt has drainage tube from his gallbladder that has been in place for 2 years.  Wife states he is not a candidate for surgery

## 2018-06-16 NOTE — ED PROVIDER NOTES
EMERGENCY DEPARTMENT HISTORY AND PHYSICAL EXAM    12:44 PM      Date: 6/16/2018  Patient Name: Shanti Cohen. History of Presenting Illness     Chief Complaint   Patient presents with    Fall    Rib Pain    Cough         History Provided By: Patient's Wife    Chief Complaint: Rib Pain s/p Fall  Duration:  Days  Timing:  Gradual and Worsening  Location: Left-sided ribs  Severity: Moderate  Modifying Factors: Made worse with palpation and movement. Associated Symptoms: None, per wife. Additional History (Context): Shanti Carrion is a [de-identified] y.o. male with a history of HLD, CAD s/p CABG (1994), MI, and vascular dementia, who presents to the ED with complaint of left-sided rib pain s/p fall on Thursday, 6/14/18. Patient's wife is the primary historian. She states that patient was recently started on Levaquin by his PCP, Dr. Vincenzo Barrera. The medication made him more lethargic and unsteady on his feet. She reports that she found patient on the floor Thursday evening after falling out of his recliner. She states that it did not seem like his fall was severe and denies associated injuries. However, patient's wife states that patient began complaining of left-sided rib pain yesterday (6/15/18) and today. She denies any associated signs of difficulty breathing or chest pain. History and ROS limited by patient's underlying dementia. Patient's wife is the primary historian.       PCP: Oriana Morales MD    Current Outpatient Prescriptions   Medication Sig Dispense Refill    potassium chloride SR (KLOR-CON 10) 10 mEq tablet TAKE 1 TABLET BY MOUTH 2 TIMES A DAY 60 Tab 1    allopurinol (ZYLOPRIM) 300 mg tablet TAKE 1 TABLET BY MOUTH ONCE DAILY 90 Tab 3    donepezil (ARICEPT) 10 mg tablet TAKE 1 TABLET BY MOUTH AT BEDTIME 90 Tab 0    oxyCODONE-acetaminophen (PERCOCET 7.5) 7.5-325 mg per tablet 1 tab every six hours as needed for pain 120 Tab 0    oxyCODONE ER (OXYCONTIN) 10 mg ER tablet Take 1 Tab by mouth every twelve (12) hours. Max Daily Amount: 20 mg. 60 Tab 0    furosemide (LASIX) 20 mg tablet TAKE 1 TABLET BY MOUTH EVERY MORNING 30 Tab 2    haloperidol (HALDOL) 0.5 mg tablet TAKE 1 TABLET BY MOUTH EVERY NIGHT AT BEDTIME 30 Tab 2    metoprolol tartrate (LOPRESSOR) 50 mg tablet TAKE 1 TABLET BY MOUTH 2 TIMES A DAY (Patient taking differently: TAKE 1/2 TABLET BY MOUTH 2 TIMES A DAY, reports 25mg BID) 60 Tab 11    aspirin delayed-release 81 mg tablet Take 81 mg by mouth two (2) times a day.  nitroglycerin (NITROSTAT) 0.4 mg SL tablet by SubLINGual route every five (5) minutes as needed for Chest Pain. Past History     Past Medical History:  Past Medical History:   Diagnosis Date    3-vessel coronary artery disease 02/13/2004    Tech limited. Low normal to mildly depressed LV systolic function. LVH. DDfx. LAE. PASP 25-30. 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 mLAD 100%. CX diffuse nonobstructive. OM1 patent stent. pRCA 100%. Dbl seq SVG to D1, OM1 100%. SVG to pRPDA 99% (3.5 x 23-mm Xience stent). Patent mid segment stent. LIMA to LAD ok.  Cardiac echocardiogram 11/16/2014    [facility]:  EF 55-60%. DDfx. Normal RVSP. Mild TR. Neg bubble study for atrial shunt. No significant valvular pathology.  Cardiac nuclear imaging test, high risk 06/15/2012    Lg area of mod mid to distal anterior ischemia. Lg area of inferior ischemia. EF 57%. Mild mid-distal anterior, inferior hypk.   High risk pharm stress test.    Cervical spine pain     Deviated septum     s/p corrective surgery 7/1/2010    Disc disease, degenerative, cervical     joint pain, back pain    Dysphagia, unspecified(787.20)     Essential hypertension, benign     Gout with other specified manifestations(274.89)     Head injury     frequent headaches    Hypercholesterolemia     Insomnia, unspecified     Lumbar canal stenosis     Meniere's disease     Myocardial infarction, anterior wall (HCC)     DIEGO (obstructive sleep apnea)     on CPAP    S/P diskectomy 7/5/07    cervical    Spinal stenosis, lumbar region, without neurogenic claudication     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     vascular dementia    Unspecified sinusitis (chronic)     Ventral hernia, unspecified, without mention of obstruction or gangrene     S/p repair 6/2003       Past Surgical History:  Past Surgical History:   Procedure Laterality Date    CARDIAC CATHETERIZATION  3/15/2017         CARDIAC SURG PROCEDURE UNLIST  1/04    angioplasty of a proximal left anterior descending    CARDIAC SURG PROCEDURE UNLIST  6/2/08    angioplasty and stenting of totally obstructed saphenous vein graft to the posterior descending artery    CORONARY ARTERY ANGIOGRAM  3/15/2017         HX APPENDECTOMY      HX CATARACT REMOVAL      bilateral    HX CERVICAL DISKECTOMY  7/07    and fusion    HX CORONARY ARTERY BYPASS GRAFT  4/14/94    X4, with LIMA to the LAD, double sequential to the diagonal and circumflex and single to the main right coronary artery    HX CORONARY STENT PLACEMENT  6/20/2012    HX GI  1992    two feet of colon removed    HX HEART CATHETERIZATION  6/02/08    HX HEART CATHETERIZATION  6/20/2012    HX HEENT  7/1/10    deviated nasal septum surgery    HX HERNIA REPAIR  7/2003    two prior hernia repairs in the past    HX OTHER SURGICAL      HX PTCA  6/20/2012    HX TONSILLECTOMY      as a child    [facility]      for crushed disk       Family History:  Family History   Problem Relation Age of Onset    Heart Disease Father     Stroke Father        Social History:  Social History   Substance Use Topics    Smoking status: Never Smoker    Smokeless tobacco: Never Used    Alcohol use No Allergies:  No Known Allergies      Review of Systems       Review of Systems   Unable to perform ROS: Dementia (History and ROS provided by patient's wife.)   Musculoskeletal:        +Left-sided rib pain. Physical Exam     Visit Vitals    /67 (BP 1 Location: Left arm)    Pulse 75    Temp 98.7 °F (37.1 °C)    Resp 20    Ht 5' 11\" (1.803 m)    Wt 69.4 kg (153 lb)    SpO2 98%    BMI 21.34 kg/m2         Physical Exam   Constitutional: He appears well-developed and well-nourished. HENT:   Head: Normocephalic and atraumatic. Eyes: Conjunctivae are normal. Pupils are equal, round, and reactive to light. Neck: Normal range of motion. Neck supple. Cardiovascular: Normal rate and regular rhythm. Pulmonary/Chest: Effort normal and breath sounds normal.   Abdominal: Soft. Bowel sounds are normal.   Musculoskeletal: Normal range of motion. Mild tenderness with palpation of the left lower rib margin. Lymphadenopathy:     He has no cervical adenopathy. Neurological: He is alert. Nonverbal due to dementia. Skin: Skin is warm. Diagnostic Study Results     Labs -  No results found for this or any previous visit (from the past 12 hour(s)). Radiologic Studies -   XR RIBS LT W PA CXR MIN 3 V    (Results Pending)     ER physician interpretation of Left Ribs X-ray:    No fracture. Medical Decision Making   I am the first provider for this patient. I reviewed the vital signs, available nursing notes, past medical history, past surgical history, family history and social history. Vital Signs-Reviewed the patient's vital signs. Records Reviewed: Nursing Notes and Old Medical Records (Time of Review: 12:44 PM)    ED Course: Progress Notes, Reevaluation, and Consults:  Reviewed results with patient's wife. Ready for discharge home. 1:22 PM     Provider Notes (Medical Decision Making):       Diagnosis     Clinical Impression:   1.  Contusion of rib on left side, initial encounter        Disposition: Discharge    Follow-up Information     Follow up With Details Comments Contact Info    Raiza Christy MD Schedule an appointment as soon as possible for a visit in 1 week  [facility] [address] [place] [address] EMERGENCY DEPT  As needed, If symptoms worsen [address] [facility]  [phone]           Patient's Medications   Start Taking    No medications on file   Continue Taking    ALLOPURINOL (ZYLOPRIM) 300 MG TABLET    TAKE 1 TABLET BY MOUTH ONCE DAILY    ASPIRIN DELAYED-RELEASE 81 MG TABLET    Take 81 mg by mouth two (2) times a day. DONEPEZIL (ARICEPT) 10 MG TABLET    TAKE 1 TABLET BY MOUTH AT BEDTIME    FUROSEMIDE (LASIX) 20 MG TABLET    TAKE 1 TABLET BY MOUTH EVERY MORNING    HALOPERIDOL (HALDOL) 0.5 MG TABLET    TAKE 1 TABLET BY MOUTH EVERY NIGHT AT BEDTIME    METOPROLOL TARTRATE (LOPRESSOR) 50 MG TABLET    TAKE 1 TABLET BY MOUTH 2 TIMES A DAY    NITROGLYCERIN (NITROSTAT) 0.4 MG SL TABLET    by SubLINGual route every five (5) minutes as needed for Chest Pain. OXYCODONE ER (OXYCONTIN) 10 MG ER TABLET    Take 1 Tab by mouth every twelve (12) hours. Max Daily Amount: 20 mg. OXYCODONE-ACETAMINOPHEN (PERCOCET 7.5) 7.5-325 MG PER TABLET    1 tab every six hours as needed for pain    POTASSIUM CHLORIDE SR (KLOR-CON 10) 10 MEQ TABLET    TAKE 1 TABLET BY MOUTH 2 TIMES A DAY   These Medications have changed    No medications on file   Stop Taking    FUROSEMIDE (LASIX) 20 MG TABLET    Take 20 mg by mouth daily. LEVOFLOXACIN (LEVAQUIN) 500 MG TABLET    Take one tablet by mouth daily for seven days    MULTIVITAMINS W-MINERALS/LUT (CENTRUM SILVER PO)    Take 1 Tab by mouth daily.     SULFAMETHOXAZOLE-TRIMETHOPRIM (BACTRIM;SEPTRA) 200-40 MG/5 ML SUSPENSION    2 teaspoons twice per day     _______________________________    Scribe Attestation:     Pj Scanlon, acting as a scribe for and in the presence of Link Shiver, MD      June 16, 2018 at 12:44 PM       Provider Attestation:      I personally performed the services described in the documentation, reviewed the documentation, as recorded by the scribe in my presence, and it accurately and completely records my words and actions. June 16, 2018 at 12:44 PM - Tammi Diop MD      _______________________________  Results reviwed with pt and wife, they understand and agree with dispo and F/U plan.   Tammi Diop MD  1:27 PM

## 2018-06-16 NOTE — ED TRIAGE NOTES
Wife states she found the pt on the floor in front of is recliner Wednesday morning. Unknown down time. Thought he was getting better, but now pt is c/o left rib pain. Pt has vascular dementia from a previous CVA.  She also stopped giving him Levaquin after 2 pills because he was becoming weaker and his memory was worse

## 2018-06-18 NOTE — PROGRESS NOTES
Hospital Discharge Follow Up          Ivory Saab was hospitalized at [facility] 3/10-3/15/18 for PNA, sepsis, AMS and discharged to home with [facility] home care. Mr. Michael Abebe was seen in [facility] ED on 6/16/18 for a fall which occurred on 6/13/18. The patient complained of rib pain and a cough and was diagnosed with a contusion of the rib on the left side. No new medications were prescribed. X rays revealed no rib fracture. Reached patient's wife who stated she was at work at the time of the call and requested to call back to this writer today after work. Return call received from wife, Marshal Lopez, who stated patient's rib pain is a little better today. Patient is not complaining as much about his ribs hurting. Wife stated patient continues to take all medications as directed. Goals Addressed             Most Recent     Knowledge and adherence of prescribed medication (ie. action, side effects, missed dose, etc.). On track (6/18/2018)             Plan:  Reconcile medications.   Wife to teach back purpose and when to give-done 3/16/18, 4/24/18-wife able to teach back purpose of medications and when to give  6/18/18-taking medications as directed

## 2018-06-25 NOTE — PROGRESS NOTES
Hospital Discharge Follow Up          Patricia Stroud was hospitalized at [facility] 3/10-3/15/18 for PNA, sepsis, AMS and discharged to home with [facility] home care.     Mr. Javi Lu was seen in [facility] ED on 6/16/18 for a fall which occurred on 6/13/18. Spoke with wife who stated the patient \"is not doing well. I think he has given up. \"  Per wife patient has a poor appetite and does not sleep well. \"  Wife tearful. Patient has an appointment with Dr. Tay Baeza on 6/29/18. Message sent to Dr. Tay Baeza.

## 2018-06-29 PROBLEM — L89.312 DECUBITUS ULCER OF RIGHT BUTTOCK, STAGE 2 (HCC): Status: ACTIVE | Noted: 2018-01-01

## 2018-06-29 NOTE — PROGRESS NOTES
The patient presents to the office today with the chief complaint of cough    HPI    The patient remains quite confused. He has a persistent cough - minimally productive. The patient is status post replacement of the percutaneous tube draining his gall bladder. He persists with pain in his right upper quadrant of his abdomen. The patien magi has breakdown of the skin on his right buttock near the midline. Review of Systems   Respiratory: Positive for cough. Negative for shortness of breath. Cardiovascular: Negative for chest pain and leg swelling. Gastrointestinal: Positive for abdominal pain. No Known Allergies    Current Outpatient Prescriptions   Medication Sig Dispense Refill    clindamycin (CLEOCIN) 300 mg capsule Take 1 Cap by mouth three (3) times daily for 10 days. 30 Cap 0    oxyCODONE-acetaminophen (PERCOCET 7.5) 7.5-325 mg per tablet 1 tab every six hours as needed for pain 120 Tab 0    oxyCODONE ER (OXYCONTIN) 10 mg ER tablet Take 1 Tab by mouth every twelve (12) hours. Max Daily Amount: 20 mg. 60 Tab 0    haloperidol (HALDOL) 0.5 mg tablet TAKE 1 TABLET BY MOUTH AT BEDTIME 30 Tab 2    potassium chloride SR (KLOR-CON 10) 10 mEq tablet TAKE 1 TABLET BY MOUTH 2 TIMES A DAY 60 Tab 1    allopurinol (ZYLOPRIM) 300 mg tablet TAKE 1 TABLET BY MOUTH ONCE DAILY 90 Tab 3    donepezil (ARICEPT) 10 mg tablet TAKE 1 TABLET BY MOUTH AT BEDTIME 90 Tab 0    furosemide (LASIX) 20 mg tablet TAKE 1 TABLET BY MOUTH EVERY MORNING 30 Tab 2    nitroglycerin (NITROSTAT) 0.4 mg SL tablet by SubLINGual route every five (5) minutes as needed for Chest Pain.  metoprolol tartrate (LOPRESSOR) 50 mg tablet TAKE 1 TABLET BY MOUTH 2 TIMES A DAY (Patient taking differently: TAKE 1/2 TABLET BY MOUTH 2 TIMES A DAY, reports 25mg BID) 60 Tab 11    aspirin delayed-release 81 mg tablet Take 81 mg by mouth two (2) times a day.          Past Medical History:   Diagnosis Date    3-vessel coronary artery disease 02/13/2004    Tech limited. Low normal to mildly depressed LV systolic function. LVH. DDfx. LAE. PASP 25-30. 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 mLAD 100%. CX diffuse nonobstructive. OM1 patent stent. pRCA 100%. Dbl seq SVG to D1, OM1 100%. SVG to pRPDA 99% (3.5 x 23-mm Xience stent). Patent mid segment stent. LIMA to LAD ok.  Cardiac echocardiogram 11/16/2014    [facility]:  EF 55-60%. DDfx. Normal RVSP. Mild TR. Neg bubble study for atrial shunt. No significant valvular pathology.  Cardiac nuclear imaging test, high risk 06/15/2012    Lg area of mod mid to distal anterior ischemia. Lg area of inferior ischemia. EF 57%. Mild mid-distal anterior, inferior hypk.   High risk pharm stress test.    Cervical spine pain     Deviated septum     s/p corrective surgery 7/1/2010    Disc disease, degenerative, cervical     joint pain, back pain    Dysphagia, unspecified(787.20)     Essential hypertension, benign     Gout with other specified manifestations(274.89)     Head injury     frequent headaches    Hypercholesterolemia     Insomnia, unspecified     Lumbar canal stenosis     Meniere's disease     Myocardial infarction, anterior wall (HCC)     DIEGO (obstructive sleep apnea)     on CPAP    S/P diskectomy 7/5/07    cervical    Spinal stenosis, lumbar region, without neurogenic claudication     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     vascular dementia    Unspecified sinusitis (chronic)     Ventral hernia, unspecified, without mention of obstruction or gangrene     S/p repair 6/2003       Past Surgical History:   Procedure Laterality Date    CARDIAC CATHETERIZATION  3/15/2017         CARDIAC SURG PROCEDURE UNLIST  1/04    angioplasty of a proximal left anterior descending    CARDIAC SURG PROCEDURE UNLIST  6/2/08    angioplasty and stenting of totally obstructed saphenous vein graft to the posterior descending artery    CORONARY ARTERY ANGIOGRAM  3/15/2017         HX APPENDECTOMY      HX CATARACT REMOVAL      bilateral    HX CERVICAL DISKECTOMY  7/07    and fusion    HX CORONARY ARTERY BYPASS GRAFT  4/14/94    X4, with LIMA to the LAD, double sequential to the diagonal and circumflex and single to the main right coronary artery    HX CORONARY STENT PLACEMENT  6/20/2012    HX GI  1992    two feet of colon removed    HX HEART CATHETERIZATION  6/02/08    HX HEART CATHETERIZATION  6/20/2012    HX HEENT  7/1/10    deviated nasal septum surgery    HX HERNIA REPAIR  7/2003    two prior hernia repairs in the past    HX OTHER SURGICAL      HX PTCA  6/20/2012    HX TONSILLECTOMY      as a child    [facility] [address] [facility] [place]       Social History     Social History    Marital status:      Spouse name: N/A    Number of children: N/A    Years of education: N/A     Occupational History    Not on file. Social History Main Topics    Smoking status: Never Smoker    Smokeless tobacco: Never Used    Alcohol use No    Drug use: No    Sexual activity: No     Other Topics Concern    Not on file     Social History Narrative       Patient does have an advanced directive on file    Visit Vitals    /58 (BP 1 Location: Left arm, BP Patient Position: Sitting)    Pulse 89    Temp 99.6 °F (37.6 °C) (Tympanic)    Resp 22    Ht 5' 11\" (1.803 m)    SpO2 97%    BMI 21.34 kg/m2       Physical Exam   Neck: Carotid bruit is not present. Cardiovascular: Normal rate and regular rhythm. Exam reveals no gallop. No murmur heard. Pulmonary/Chest: He has no wheezes. He has no rales. Abdominal: Soft. He exhibits no distension. There is tenderness (Mid abdomen). Musculoskeletal: He exhibits no edema.    Skin: BMI:  [facility] Outpatient Visit on 06/29/2018   Component Date Value Ref Range Status    WBC 06/29/2018 12.2  4.6 - 13.2 K/uL Final    RBC 06/29/2018 4.53* 4.70 - 5.50 M/uL Final    HGB 06/29/2018 13.1  13.0 - 16.0 g/dL Final    HCT 06/29/2018 40.2  36.0 - 48.0 % Final    MCV 06/29/2018 88.7  74.0 - 97.0 FL Final    MCH 06/29/2018 28.9  24.0 - 34.0 PG Final    MCHC 06/29/2018 32.6  31.0 - 37.0 g/dL Final    RDW 06/29/2018 15.0* 11.6 - 14.5 % Final    PLATELET 78/68/7631 301  135 - 420 K/uL Final    MPV 06/29/2018 10.9  9.2 - 11.8 FL Final    NEUTROPHILS 06/29/2018 81* 40 - 73 % Final    LYMPHOCYTES 06/29/2018 11* 21 - 52 % Final    MONOCYTES 06/29/2018 7  3 - 10 % Final    EOSINOPHILS 06/29/2018 1  0 - 5 % Final    BASOPHILS 06/29/2018 0  0 - 2 % Final    ABS. NEUTROPHILS 06/29/2018 9.9* 1.8 - 8.0 K/UL Final    ABS. LYMPHOCYTES 06/29/2018 1.4  0.9 - 3.6 K/UL Final    ABS. MONOCYTES 06/29/2018 0.9  0.05 - 1.2 K/UL Final    ABS. EOSINOPHILS 06/29/2018 0.1  0.0 - 0.4 K/UL Final    ABS. BASOPHILS 06/29/2018 0.0  0.0 - 0.06 K/UL Final    DF 06/29/2018 AUTOMATED    Final    Sodium 06/29/2018 138  136 - 145 mmol/L Final    Potassium 06/29/2018 4.4  3.5 - 5.5 mmol/L Final    Chloride 06/29/2018 100  100 - 108 mmol/L Final    CO2 06/29/2018 31  21 - 32 mmol/L Final    Anion gap 06/29/2018 7  3.0 - 18 mmol/L Final    Glucose 06/29/2018 152* 74 - 99 mg/dL Final    BUN 06/29/2018 20* 7.0 - 18 MG/DL Final    Creatinine 06/29/2018 0.74  0.6 - 1.3 MG/DL Final    BUN/Creatinine ratio 06/29/2018 27* 12 - 20   Final    GFR est AA 06/29/2018 >60  >60 ml/min/1.73m2 Final    GFR est non-AA 06/29/2018 >60  >60 ml/min/1.73m2 Final    Comment: (NOTE)  Estimated GFR is calculated using the Modification of Diet in Renal   Disease (MDRD) Study equation, reported for both  Americans   (GFRAA) and non- Americans (GFRNA), and normalized to 1.73m2   body surface area.  The physician must decide which value applies to   the patient. The MDRD study equation should only be used in   individuals age 25 or older. It has not been validated for the   following: pregnant women, patients with serious comorbid conditions,   or on certain medications, or persons with extremes of body size,   muscle mass, or nutritional status.  Calcium 06/29/2018 9.3  8.5 - 10.1 MG/DL Final    Bilirubin, total 06/29/2018 0.7  0.2 - 1.0 MG/DL Final    ALT (SGPT) 06/29/2018 48  16 - 61 U/L Final    AST (SGOT) 06/29/2018 18  15 - 37 U/L Final    Alk. phosphatase 06/29/2018 222* 45 - 117 U/L Final    Protein, total 06/29/2018 6.7  6.4 - 8.2 g/dL Final    Albumin 06/29/2018 3.1* 3.4 - 5.0 g/dL Final    Globulin 06/29/2018 3.6  2.0 - 4.0 g/dL Final    A-G Ratio 06/29/2018 0.9  0.8 - 1.7   Final   Admission on 04/20/2018, Discharged on 04/20/2018   Component Date Value Ref Range Status    Prothrombin time 04/20/2018 14.3* 0.0 - 14.0 seconds Final    INR 04/20/2018 1.2* 0.0 - 1.1   Final    Comment: PATIENTS RECEIVING THE ANTIBIOTIC CUBICIN (DAPTOMYCIN FOR INJECTION) SHOULD BE TESTED FOR PT/INR  BY AN ALTERNATIVE METHOD. CUBICIN HAS BEEN SHOWN TO CAUSE FALSE ELEVATION OF THE INR USING  i-STAT PT/INR CARTRIDGES. Nessa Daily Results for orders placed or performed during the hospital encounter of 06/29/18   CBC WITH AUTOMATED DIFF   Result Value Ref Range    WBC 12.2 4.6 - 13.2 K/uL    RBC 4.53 (L) 4.70 - 5.50 M/uL    HGB 13.1 13.0 - 16.0 g/dL    HCT 40.2 36.0 - 48.0 %    MCV 88.7 74.0 - 97.0 FL    MCH 28.9 24.0 - 34.0 PG    MCHC 32.6 31.0 - 37.0 g/dL    RDW 15.0 (H) 11.6 - 14.5 %    PLATELET 114 507 - 052 K/uL    MPV 10.9 9.2 - 11.8 FL    NEUTROPHILS 81 (H) 40 - 73 %    LYMPHOCYTES 11 (L) 21 - 52 %    MONOCYTES 7 3 - 10 %    EOSINOPHILS 1 0 - 5 %    BASOPHILS 0 0 - 2 %    ABS. NEUTROPHILS 9.9 (H) 1.8 - 8.0 K/UL    ABS. LYMPHOCYTES 1.4 0.9 - 3.6 K/UL    ABS. MONOCYTES 0.9 0.05 - 1.2 K/UL    ABS.  EOSINOPHILS 0.1 0.0 - 0.4 K/UL    ABS. BASOPHILS 0.0 0.0 - 0.06 K/UL    DF AUTOMATED     METABOLIC PANEL, COMPREHENSIVE   Result Value Ref Range    Sodium 138 136 - 145 mmol/L    Potassium 4.4 3.5 - 5.5 mmol/L    Chloride 100 100 - 108 mmol/L    CO2 31 21 - 32 mmol/L    Anion gap 7 3.0 - 18 mmol/L    Glucose 152 (H) 74 - 99 mg/dL    BUN 20 (H) 7.0 - 18 MG/DL    Creatinine 0.74 0.6 - 1.3 MG/DL    BUN/Creatinine ratio 27 (H) 12 - 20      GFR est AA >60 >60 ml/min/1.73m2    GFR est non-AA >60 >60 ml/min/1.73m2    Calcium 9.3 8.5 - 10.1 MG/DL    Bilirubin, total 0.7 0.2 - 1.0 MG/DL    ALT (SGPT) 48 16 - 61 U/L    AST (SGOT) 18 15 - 37 U/L    Alk. phosphatase 222 (H) 45 - 117 U/L    Protein, total 6.7 6.4 - 8.2 g/dL    Albumin 3.1 (L) 3.4 - 5.0 g/dL    Globulin 3.6 2.0 - 4.0 g/dL    A-G Ratio 0.9 0.8 - 1.7         Assessment / Plan      ICD-10-CM ICD-9-CM    1. Cough R05 786.2 XR CHEST PA LAT      CBC WITH AUTOMATED DIFF      METABOLIC PANEL, COMPREHENSIVE   2. Chronic cholecystitis K81.1 575.11 XR CHEST PA LAT      CBC WITH AUTOMATED DIFF      METABOLIC PANEL, COMPREHENSIVE   3. Encephalopathy G93.40 348.30    4. Decubitus ulcer of right buttock, stage 2 L89.312 707.05      707.22        Chest Xray  Labs  Skin care from home health    Follow-up Disposition:  Return in about 3 weeks (around 7/20/2018). I asked Suze Page if he has any questions and I answered the questions. Suze Whitehead. states that he understands the treatment plan and agrees with the treatment plan

## 2018-06-29 NOTE — PROGRESS NOTES
Hospital Discharge Follow Up          Tim Almonte was hospitalized at [facility] 3/10-3/15/18 for PNA, sepsis, AMS and discharged to home with [facility] home care.      Mr. Shashi Quezada was seen in [id number] [facility] ED on 6/16/18 for a fall which occurred on 6/13/18.      The patient had a scheduled procedure at [facility] on 6/28/18 to exchange his biliary drain tube. Today, 6/29/18, the patient attended an appointment with Dr. Leif Campos. The patient had complaints of a cough that has not resolved. Cleocin was prescribed. The visit note is not yet available. Goals Addressed             Most Recent     Attends follow-up appointments as directed. On track (5/30/2018)             Plan:  Monitor for attendance at apts and assist as needed to schedule.   3/21/18, 3/30/18, 4/13/18, 5/4/18, 5/29/18, 6/29/18-Attended apt with Dr. Leif Campos

## 2018-06-29 NOTE — PROGRESS NOTES
1. Have you been to the ER, urgent care clinic since your last visit? Hospitalized since your last visit? No    2. Have you seen or consulted any other health care providers outside of the [address] [place] [address] since your last visit? Include any pap smears or colon screening.  No

## 2018-07-09 NOTE — PROGRESS NOTES
Hospital Discharge Follow Up          Danilo Tim was hospitalized at [facility] 3/10-3/15/18 for PNA, sepsis, AMS and discharged to home with [facility] home care.      Mr. Pao Rhoades was seen in [address] [facility] ED on 6/16/18 for a fall which occurred on 6/13/18.       The patient had a scheduled procedure at [facility] on 6/28/18 to exchange his biliary drain tube. Spoke with wife who stated patient still has a cough despite Cleocin prescribed on 6/29/18. Wife will try to take patient for chest XRAY tomorrow if he is able to leave home. Patient has been very weak lately. BP is labile. Mrs. Pao Rhoades brought up putting the patient on hospice care. Discussed the hospice philosophy of care and encouraged wife to discuss with Dr. Love Farooq. Goals Addressed             Most Recent     Attends follow-up appointments as directed. On track (7/9/2018)             Plan:  Monitor for attendance at apts and assist as needed to schedule.   3/21/18, 3/30/18, 4/13/18, 5/4/18, 5/29/18, 6/29/18-Attended apt with Dr. Dacia Monzon patients and caregivers for end of life decisions (ie. need for hospice, pain management, symptom relief, advance directives etc.)   On track (7/9/2018)             7/9/18-Wife considering hospice care  Plan:  Provide information re hospice care, support wife in decision making

## 2018-07-13 NOTE — PROGRESS NOTES
Hospital Discharge Follow Up          Tevin Hanson was hospitalized at [facility] 3/10-3/15/18 for PNA, sepsis, AMS and discharged to home with [facility] home care.      Mr. Dinh Lancaster was seen in [address] [facility] ED on 6/16/18 for a fall which occurred on 6/13/18.        The patient had a scheduled procedure at [facility] on 6/28/18 to exchange his biliary drain tube. Spoke with wife who stated Hospice care was arranged by Dr. Erlinda Gilbert on 7/9/18 on the same day of last call. Patient now has [facility]. Wife stated patient is not doing well and she was told he may have about two weeks to live. Brief conversation d/t wife's cell phone low on battery. Provided emotional support. Wife stated she will return a call to this writer soon. Goals Addressed             Most Recent     Prepare patients and caregivers for end of life decisions (ie. need for hospice, pain management, symptom relief, advance directives etc.)   On track (7/13/2018)             7/9/18-Wife considering hospice care  Plan:  Provide information re hospice care, support wife in decision making  7/13/19-Per wife patient admitted to [facility] after last call on 7/9/18.

## 2018-07-17 NOTE — PROGRESS NOTES
Hospital Discharge Follow Up          Christine Natarajan was hospitalized at [facility] 3/10-3/15/18 for PNA, sepsis, AMS and discharged to home with [facility] home care.      Mr. Vanna Olmedo was seen in [address] [facility] ED on 6/16/18 for a fall which occurred on 6/13/18.        The patient had a scheduled procedure at [facility] on 6/28/18 to exchange his biliary drain tube. Spoke with wife who stated patient is slowly getting worse and is generally non-verbal now. A portable chest XRAY was completed today. [facility] continues to provide care in the home. The family  visits often and various family members come in to help. Encouraged wife to care for herself and eat regular meals. Provided emotional support    Goals Addressed             Most Recent     Prepare patients and caregivers for end of life decisions (ie. need for hospice, pain management, symptom relief, advance directives etc.)   On track (7/17/2018)             7/9/18-Wife considering hospice care  Plan:  Provide information re hospice care, support wife in decision making  7/13/19-Per wife patient admitted to [facility] after last call on 7/9/18.

## 2018-07-19 NOTE — TELEPHONE ENCOUNTER
Requested Prescriptions     Pending Prescriptions Disp Refills    oxyCODONE-acetaminophen (PERCOCET 7.5) 7.5-325 mg per tablet 120 Tab 0     Si tab every six hours as needed for pain    oxyCODONE ER (OXYCONTIN) 10 mg ER tablet 60 Tab 0     Sig: Take 1 Tab by mouth every twelve (12) hours. Max Daily Amount: 20 mg.

## 2018-07-22 NOTE — TELEPHONE ENCOUNTER
Received a call from [facility] from Hospice.   The patient was pronounced dead this morining

## 2018-07-23 ENCOUNTER — PATIENT OUTREACH (OUTPATIENT)
Dept: INTERNAL MEDICINE CLINIC | Age: 83
End: 2018-07-23

## 2018-12-13 NOTE — PROGRESS NOTES
1. Have you been to the ER, urgent care clinic since your last visit? Hospitalized since your last visit? No    2. Have you seen or consulted any other health care providers outside of the [facility] since your last visit? Include any pap smears or colon screening.  No Statement Selected

## 2019-01-01 NOTE — DISCHARGE SUMMARY
Discharge Summary  [facility]      Patient: Ken Zarco. Age: 80 y.o. Sex: male  : 1935    MRN: [id number]      DOA: 2017      Discharge Date: 2017      Amanda Freeman MD      PCP: Netta Perla MD        ================================================================    Reason for Admission:   Sepsis ([address]  CAP (community acquired pneumonia)  Lung nodule  Hypokalemia  Acute encephalopathy  Sepsis [facility])    Discharge Diagnoses:   CAD  Diverticulitis  Spinal Stenosis  DM2  HTN  Acute encephalopathy 2/2 sepsis  Cholelithiasis w/ GB distension  Lung nodule  Liver nodule  HLD  Gout  Chronic pain    Important notes to PCP/ follow-up studies and evaluations   Pt requires follow up outpatient for lung nodules, liver nodules, cholelithiasis. Pt's namenda discontinued by PCP and mirtazapine started before admission. Pending labs and studies:  None  Operative Procedures:   none    Discharge Medications:         Current Discharge Medication List      START taking these medications    Details   levoFLOXacin (LEVAQUIN) 750 mg tablet Take 1 Tab by mouth daily. Qty: 7 Tab, Refills: 0         CONTINUE these medications which have NOT CHANGED    Details   oxyCODONE ER (OXYCONTIN) 20 mg ER tablet Take 1 Tab by mouth every twelve (12) hours. Max Daily Amount: 40 mg.  Indications: Chronic Pain  Qty: 60 Tab, Refills: 0    Associated Diagnoses: DISH (diffuse idiopathic skeletal hyperostosis)      mirtazapine (REMERON SOL-TAB) 15 mg disintegrating tablet 1 tablet at bedtime  Qty: 30 Tab, Refills: 3      simvastatin (ZOCOR) 40 mg tablet TAKE 1 TABLET BY MOUTH ONCE DAILY  Qty: 30 Tab, Refills: 6      potassium chloride (KLOR-CON) 10 mEq tablet TAKE 1 TABLET BY MOUTH 2 TIMES A DAY  Qty: 60 Tab, Refills: 1      indapamide (LOZOL) 1.25 mg tablet TAKE 1 TABLET BY MOUTH ONCE DAILY  Qty: 30 Tab, Refills: 2      oxyCODONE-acetaminophen (PERCOCET 10)  mg per tablet Take 1 Tab This note was copied from the mother's chart.  Routine visit. Nany is discharging home with her baby and  today. She states breastfeeding is going well. She's using lanolin for tender nipples. Warm compresses discussed as well. Recommended she continue using infant feeding log to monitor infant's feedings, voids and stools and use outpatient lactation resources as needed. Nany and her  appreciative of my visit.   by mouth every eight (8) hours as needed (for Breakthrough pain). Max Daily Amount: 3 Tabs. Indications: Pain  Qty: 90 Tab, Refills: 0    Associated Diagnoses: DISH (diffuse idiopathic skeletal hyperostosis)      allopurinol (ZYLOPRIM) 300 mg tablet TAKE 1 TABLET BY MOUTH ONCE DAILY  Qty: 90 Tab, Refills: 3      aspirin delayed-release 81 mg tablet Take 81 mg by mouth two (2) times a day. metoprolol tartrate (LOPRESSOR) 50 mg tablet TAKE 1 TABLET BY MOUTH 2 TIMES A DAY  Qty: 60 Tab, Refills: 6      NITROSTAT 0.4 mg SL tablet DISSOLVE 1 TABLET UNDER THE TONGUE EVERY 5 MINUTES AS NEEDED  Qty: 25 Tab, Refills: 0      MULTIVITAMINS W-MINERALS/LUT (CENTRUM SILVER PO) Take 1 Tab by mouth daily. naloxone (NARCAN) 4 mg/actuation spry Use 1 spray intranasally into 1 nostril. Qty: 1 Box, Refills: 0      donepezil (ARICEPT) 10 mg tablet Take 10 mg by mouth nightly. Associated Diagnoses: DISH (diffuse idiopathic skeletal hyperostosis); Essential hypertension, benign; Atherosclerosis of native coronary artery of native heart without angina pectoris; Dyslipidemia; Anorexia      niacin (NIASPAN) 1,000 mg Tb24 tab Take 1 tablet by mouth nightly. Qty: 30 tablet, Refills: 11                   Disposition: Home with Home Health    Consultants:    None    Brief Hospital Course (including pertinent history and physical findings)  [facility]. is a 80 y.o. 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 In ED he had WBC 15.1, temp 101.5F, initial lactate 2.6.  CT head with no acute process but chronic small vessel disease, CXR and CT abd & pelvis showed lower lung infiltrates. CT abd & pelvis also showed prominent enlarged prostate with atypical left seminal vesicle, lung bases well formed nodules in right lower lobe, right middle lobe and subcarinal node enlargment, cholelithiasis with distended gall bladder, and 1.2x1.0 cm nodule in right lobe of the liver. Pt's WBC count improved to 9.1, blood culture was negative, and patient's mental status improved to baseline. Summarized key findings and results (labs, imaging studies, ECHO, cardiac cath, endoscopies, etc): Blood cx: NGTD  EKG 8/11: Normal sinus rhythm, Right BBB  CXR 8/11: Hazy opacity at the left lung base may represent atelectasis versus infiltrate. Possible small left pleural effusion. CT Abd/pelvis 8/11: L lung infiltrates. Infiltrates in R lung base. 0.6cm nodule in R mid lobe, 1.0cm nodule in R lower lobe, 3.1 x 1.3 cm node in subcarinal region. 1.2 x 1.0 cm hypodensity in R lobe of liver; Multiple gallstones w/ distension. Apparent stool retention in the ascending and descending colon. Prominently enlarged prostate  CT Head 8/11: Chronic small vessel ischemic changes. Interval mild increase in the size of the ventricles. Functional status and cognitive function:    Ambulates without assistance  Status: alert, cooperative, no distress, appears stated age    Diet: Cardiac    Code status and advanced care plan: Full  [facility] Contact:     Follow-up:   Follow-up Information     Follow up With Details Comments Contact Info    Alka Velázquez MD Go on 8/17/2017 @ 12:45 [address]              ================================================================  Danica Gillette MD, PGY-1  [facility]  08/14/17 12:43 PM

## 2021-08-05 NOTE — TELEPHONE ENCOUNTER
Home health nurse state patient's blood pressure is  99/60 today. She also says patient's wife feels he is \"a little off today, sleepy today. She thinks it may be due to new medication, Sulfatrim. Please call home health nurse to advise. Drysol Counseling:  I discussed with the patient the risks of drysol/aluminum chloride including but not limited to skin rash, itching, irritation, burning.

## 2022-10-20 NOTE — PROGRESS NOTES
1. Have you been to the ER, urgent care clinic since your last visit? Hospitalized since your last visit? Yes 3-  Cath     2. Have you seen or consulted any other health care providers outside of the [address] [place] [address] since your last visit? Include any pap smears or colon screening.  No Department of Anesthesiology  Postprocedure Note    Patient: Al Mccray  MRN: [id number]  YOB: 1951  Date of evaluation: 10/20/2022      Procedure Summary     Date: 10/20/22 Room / Location: [facility] CATH LAB    Anesthesia Start: 0930 Anesthesia Stop: 0902    Procedure: LINDA W ANESTHESIA Diagnosis:       Presence of other cardiac implants and grafts      Paroxysmal atrial fibrillation    Scheduled Providers:  Responsible Provider: CHRIS Forte CRNA    Anesthesia Type: TIVA, MAC ASA Status: 3          Anesthesia Type: No value filed.     Harsha Phase I:      Harsha Phase II:        Anesthesia Post Evaluation    Patient location during evaluation: bedside  Patient participation: complete - patient participated  Level of consciousness: sleepy but conscious  Pain score: 0  Airway patency: patent  Nausea & Vomiting: no nausea and no vomiting  Complications: no  Cardiovascular status: hemodynamically stable  Respiratory status: acceptable  Hydration status: stable